# Patient Record
Sex: MALE | Race: WHITE | NOT HISPANIC OR LATINO | Employment: FULL TIME | URBAN - METROPOLITAN AREA
[De-identification: names, ages, dates, MRNs, and addresses within clinical notes are randomized per-mention and may not be internally consistent; named-entity substitution may affect disease eponyms.]

---

## 2018-01-16 NOTE — PROGRESS NOTES
Assessment    1  Psoriasis (696 1) (L40 9)   2  Benign enlargement of prostate (600 00) (N40 0)   3  Hyperlipidemia (272 4) (E78 5)   4  Malaise and fatigue (780 79) (R53 81,R53 83)    Plan  Encounter for screening for malignant neoplasm of prostate    · (1) PSA (SCREEN) (Dx V76 44 Screen for Prostate Cancer); Status:Active; Requested  for:12Ivc0815; Health Maintenance, Hyperlipidemia    · Follow-up visit in 1 year Evaluation and Treatment  Follow-up  Status: Complete  Done:  49PUE9212  Hyperlipidemia    · (Q) COMPREHENSIVE METABOLIC PANEL W/O ALT; Status:Active; Requested  for:05Lmw5204;    · (Q) LIPID PANEL WITH DIRECT LDL; Status:Active; Requested for:03Lsp4469;   Malaise and fatigue    · (Q) TESTOSTERONE, TOTAL, LC/MS/MS; Status:Active; Requested for:74Zwl7754;   PMH: Seborrheic dermatitis of scalp    · Ketoconazole 2 % External Cream; APPLY A THIN LAYER TO AFFECTED  AREA(S) AND RUB IN WELL TWICE DAILY   · Ketoconazole 2 % External Shampoo; USE AS DIRECTED  Screening for depression    · *VB-Depression Screening; Status:Complete - Retrospective By Protocol Authorization;    Done: 29WVW4636 03:07PM    Discussion/Summary  Impression: health maintenance visit  Currently, he eats an adequate diet and has an inadequate exercise regimen  Prostate cancer screening: prostate cancer screening is current  Testicular cancer screening: monthly self testicular exam was advised  Colorectal cancer screening: colorectal cancer screening is current  Screening lab work includes glucose, lipid profile, 25-hydroxyvitamin D and done  The immunizations are up to date  Advice and education were given regarding nutrition, aerobic exercise, calcium supplements, vitamin D supplements, reproductive health, cardiovascular risk reduction, sunscreen use and self skin examination  Patient discussion: discussed with the patient, 30 minute visit, greater than half of the time was spent on counseling        Chief Complaint  Pt is here for a yearly phys  History of Present Illness  Groin Sprain/Strain (Brief): The patient is being seen for an initial evaluation of an existing diagnosis of strain of the groin  The injury occurred none known  Symptoms:  decreased range of motion    The patient presents with complaints of gradual onset of frequent episodes of mild groin stiffness  Episodes started about 2-3 weeks ago  Symptoms are improving  The patient is currently experiencing symptoms  especially getting in and out of car The patient is not currently being treated for this problem  Shoulder Problem: CECI URIOSTEGUI presents with complaints of shoulder problem  , Adult Male: The patient is being seen for a health maintenance evaluation  The last health maintenance visit was 2 year(s) ago  Social History: Household members include spouse and 2 daughter(s)  He is   Work status: working full time and occupation: sales  The patient quit smoking 1993  He has 10 pack year(s) of cigarette use  He reports occasional alcohol use  General Health: The patient's health since the last visit is described as good  He has regular dental visits  He complains of vision problems  Vision care includes an eye examination within the last year  He has hearing loss  Immunizations status: up to date   SEE ENT eval    Lifestyle:  He exercises regularly  He exercises less than three times a week  Exercise includes walking  He does not use tobacco  He consumes alcohol  Reproductive health:  the patient is sexually active  Screening: Prostate cancer screening includes last prostate-specific antigen testing 8/16  Testicular cancer screening includes monthly self testicular examinations  Colorectal cancer screening includes last colonoscopy done 8/16  metabolic screening reviewed and current  Hyperlipidemia (Follow-Up): The patient states his hyperlipidemia has been poorly controlled since the last visit     Symptoms:   Lifestyle: Exercise: He does not exercise regularly  Medications: the patient is not adherent with his medication regimen  skips days and occasionally a week The patient is not doing well with his hyperlipidemia goals  the patient's LDL goal is 100 mg/dL  the patient's last LDL was 156 mg/dL  Fatigue: The patient is being seen for an initial evaluation of and ?testosterone level? fatigue  Dermatitis, Unspecified (Brief): The patient is being seen for a routine clinic follow-up of and seborhea face , chesr unspecified dermatitis  Symptoms:  patchy rash  Associated symptoms:  flaking skin  Current treatment includes topical antifungals  Review of Systems    Constitutional: feeling tired  Eyes: no eyesight problems  ENT: hearing loss and see ENT notes/MRI negative  Cardiovascular: no chest pain  Respiratory: no shortness of breath  Gastrointestinal: no constipation  Genitourinary: no incontinence  Musculoskeletal: arthralgias, but as noted in HPI  Integumentary: as noted in HPI  ROS reviewed  Active Problems    1  Abdominal pain (789 00) (R10 9)   2  Benign enlargement of prostate (600 00) (N40 0)   3  Diarrhea (787 91) (R19 7)   4  Encounter for screening colonoscopy (V76 51) (Z12 11)   5  Encounter for screening for malignant neoplasm of prostate (V76 44) (Z12 5)   6  Esophagitis, reflux (530 11) (K21 0)   7   Hyperlipidemia (272 4) (E78 5)    Past Medical History    · History of Abdominal cramping (789 00) (R10 9)   · History of Benign Polyps Of The Large Intestine (V12 72)   · History of Laceration Of Hand (882 0)   · History of Pain in joint of right shoulder region (719 41) (M25 511)   · History of Rectal polyp (569 0) (K62 1)   · History of Seborrheic dermatitis of scalp (690 18) (L21 9)    Surgical History    · History of Appendectomy   · History of Colonoscopy (Fiberoptic)   · History of Corneal LASIK   · History of Septoplasty   · History of Surgery Vas Deferens Vasectomy    Family History  Mother    · Family history of Back Pain   · Family history of Hypertension (V17 49)  Father    · Family history of Malignant Melanoma Of The Skin (V16 8)   · Family history of Prostate Cancer (V16 42)  Brother    · Family history of Osteoporosis (V17 81)    Social History    · Being A Social Drinker   · Denied: History of Drug Use   · Former smoker (V15 82) (D99 759)   · quit 1993    Current Meds   1  Crestor 5 MG Oral Tablet; Take 1 tablet daily; Therapy: 89DVN5374 to (Evaluate:17Nks9918)  Requested for: 54UTY4416; Last   Rx:48Bmw1315 Ordered    Allergies    1  No Known Drug Allergies    Vitals   Recorded: 02Ysf8690 03:32PM Recorded: 73ZBF2171 51:36LB   Systolic 062    Diastolic 78    Height  5 ft 11 in   Weight  185 lb 2 08 oz   BMI Calculated  25 82   BSA Calculated  2 04     Physical Exam    Constitutional   General appearance: No acute distress, well appearing and well nourished  well developed, within normal limits of ideal weight and appearance reflects stated age  Eyes   Pupils and irises: Equal, round, reactive to light  Ears, Nose, Mouth, and Throat   Otoscopic examination: Tympanic membranes translucent with normal light reflex  Canals patent without erythema  Nasal mucosa, septum, and turbinates: Normal without edema or erythema  Oropharynx: Normal with no erythema, edema, exudate or lesions  Pulmonary   Auscultation of lungs: Clear to auscultation  Cardiovascular   Auscultation of heart: Normal rate and rhythm, normal S1 and S2, no murmurs  Peripheral vascular exam: Normal     Examination of extremities for edema and/or varicosities: Normal     Abdomen   Abdomen: Non-tender, no masses  Liver and spleen: No hepatomegaly or splenomegaly  Examination for hernias: No hernias appreciated  Musculoskeletal   Gait and station: Normal     Inspection/palpation of joints, bones, and muscles: Abnormal   Palpation - right trapezius trigger points siddhartha T1 dermatome tenderness     Range of motion: Abnormal   Range of Motion: Right Shoulder: Full  Abduction: painful restricted AROM 170 degrees  Stability: Normal     Muscle strength/tone: Normal     Neurologic   Sensation: No sensory loss      Psychiatric   Orientation to person, place and time: Normal     Mood and affect: Normal        Results/Data  *VB-Depression Screening 96VDA6173 49:60RL Dora Mercury     Test Name Result Flag Reference   Depression Scale Result      Depression Screen - Negative For Symptoms     Prime MD Depression Screening 53Ies1423 03:06PM User, s     Test Name Result Flag Reference   PRIME-MD Depression Screening 0/9 - Likely not MD     Depressed mood: No  Loss of interest: No       Signatures   Electronically signed by : Zakiya Snider DO; Sep 19 9374  4:20PM EST                       (Author)

## 2018-01-30 LAB
ALBUMIN SERPL-MCNC: 4.4 G/DL (ref 3.6–5.1)
ALBUMIN/GLOB SERPL: 1.6 (CALC) (ref 1–2.5)
ALP SERPL-CCNC: 79 U/L (ref 40–115)
AST SERPL-CCNC: 15 U/L (ref 10–35)
BILIRUB SERPL-MCNC: 0.6 MG/DL (ref 0.2–1.2)
BUN SERPL-MCNC: 22 MG/DL (ref 7–25)
BUN/CREAT SERPL: NORMAL (CALC) (ref 6–22)
CALCIUM SERPL-MCNC: 9.8 MG/DL (ref 8.6–10.3)
CHLORIDE SERPL-SCNC: 104 MMOL/L (ref 98–110)
CHOLEST SERPL-MCNC: 214 MG/DL
CHOLEST/HDLC SERPL: 4.7 (CALC)
CO2 SERPL-SCNC: 24 MMOL/L (ref 20–31)
CREAT SERPL-MCNC: 0.98 MG/DL (ref 0.7–1.33)
GLOBULIN SER CALC-MCNC: 2.7 G/DL (CALC) (ref 1.9–3.7)
GLUCOSE SERPL-MCNC: 95 MG/DL (ref 65–99)
HDLC SERPL-MCNC: 46 MG/DL
LDLC SERPL CALC-MCNC: 131 MG/DL (CALC)
NONHDLC SERPL-MCNC: 168 MG/DL (CALC)
POTASSIUM SERPL-SCNC: 4.4 MMOL/L (ref 3.5–5.3)
PROT SERPL-MCNC: 7.1 G/DL (ref 6.1–8.1)
PSA SERPL-MCNC: 1.4 NG/ML
SL AMB EGFR AFRICAN AMERICAN: 102 ML/MIN/1.73M2
SL AMB EGFR NON AFRICAN AMERICAN: 88 ML/MIN/1.73M2
SODIUM SERPL-SCNC: 140 MMOL/L (ref 135–146)
TESTOST SERPL-MCNC: 342 NG/DL (ref 250–1100)
TRIGL SERPL-MCNC: 229 MG/DL

## 2018-02-05 RX ORDER — ROSUVASTATIN CALCIUM 5 MG/1
1 TABLET, COATED ORAL DAILY
COMMUNITY
Start: 2011-03-14 | End: 2019-03-13 | Stop reason: SDUPTHER

## 2018-02-08 ENCOUNTER — OFFICE VISIT (OUTPATIENT)
Dept: FAMILY MEDICINE CLINIC | Facility: CLINIC | Age: 53
End: 2018-02-08
Payer: COMMERCIAL

## 2018-02-08 VITALS
BODY MASS INDEX: 27.6 KG/M2 | DIASTOLIC BLOOD PRESSURE: 78 MMHG | HEIGHT: 70 IN | SYSTOLIC BLOOD PRESSURE: 124 MMHG | WEIGHT: 192.8 LBS

## 2018-02-08 DIAGNOSIS — Z00.00 HEALTH CARE MAINTENANCE: Primary | ICD-10-CM

## 2018-02-08 DIAGNOSIS — Z20.828 EXPOSURE TO THE FLU: Primary | ICD-10-CM

## 2018-02-08 DIAGNOSIS — J00 ACUTE NASOPHARYNGITIS: ICD-10-CM

## 2018-02-08 DIAGNOSIS — E78.01 FAMILIAL HYPERCHOLESTEROLEMIA: ICD-10-CM

## 2018-02-08 DIAGNOSIS — Z12.5 SCREENING FOR PROSTATE CANCER: ICD-10-CM

## 2018-02-08 PROCEDURE — 99396 PREV VISIT EST AGE 40-64: CPT | Performed by: FAMILY MEDICINE

## 2018-02-08 RX ORDER — OSELTAMIVIR PHOSPHATE 75 MG/1
75 CAPSULE ORAL DAILY
Qty: 10 CAPSULE | Refills: 0 | Status: SHIPPED | OUTPATIENT
Start: 2018-02-08 | End: 2018-02-18

## 2018-02-08 NOTE — TELEPHONE ENCOUNTER
Please sign/auth rx
Wife was diagnosed with the flu    [facility]
no dryness/no itching/no rash

## 2018-02-08 NOTE — PROGRESS NOTES
Assessment/Plan:  Patient Instructions   Hyperlipidemia   AMBULATORY CARE:   Hyperlipidemia  is a high level of lipids (fats) in your blood  These lipids include cholesterol or triglycerides  Lipids are made by your body  They also come from the foods you eat  Your body needs lipids to work properly, but high levels increase your risk for heart disease, heart attack, and stroke  Call 911 for any of the following:   · You have any of the following signs of a heart attack:      ¨ Squeezing, pressure, or pain in your chest that lasts longer than 5 minutes or returns    ¨ Discomfort or pain in your back, neck, jaw, stomach, or arm     ¨ Trouble breathing    ¨ Nausea or vomiting    ¨ Lightheadedness or a sudden cold sweat, especially with chest pain or trouble breathing    · You have any of the following signs of a stroke:      ¨ Numbness or drooping on one side of your face     ¨ Weakness in an arm or leg    ¨ Confusion or difficulty speaking    ¨ Dizziness, a severe headache, or vision loss  Contact your healthcare provider if:   · You have questions or concerns about your condition or care  Treatment of hyperlipidemia  may first include lifestyle changes to help decrease your lipid levels  You may also need to take medicine to lower your lipid levels  Some of the lifestyle changes you may need to make include the following:  · Maintain a healthy weight  Ask your healthcare provider how much you should weigh  Ask him or her to help you create a weight loss plan if you are overweight  Weight loss can decrease your cholesterol and triglyceride levels  · Exercise as directed  Exercise lowers your cholesterol levels and helps you maintain a healthy weight  Get 40 minutes or more of moderate exercise 3 to 4 days each week  You can split your exercise into four 10-minute workouts instead of 40 minutes at one time  Examples of moderate exercises include walking briskly, swimming, or riding a bike   Work with your healthcare provider to plan the best exercise program for you  · Do not smoke  Nicotine and other chemicals in cigarettes and cigars can increase your risk for a heart attack and stroke  Ask your healthcare provider for information if you currently smoke and need help to quit  E-cigarettes or smokeless tobacco still contain nicotine  Talk to your healthcare provider before you use these products  · Eat heart-healthy foods  Talk to your dietitian about a heart-healthy diet  The following will help you manage hyperlipidemia:     ¨ Decrease the total amount of fat you eat  Choose lean meats, fat-free or 1% fat milk, and low-fat dairy products, such as yogurt and cheese  Limit or do not eat red meat  Red meats are high in fat and cholesterol  ¨ Replace unhealthy fats with healthy fats  Unhealthy fats include saturated fat, trans fat, and cholesterol  Choose soft margarines that are low in saturated fat and have little or no trans fat  Monounsaturated fats are healthy fats  These are found in olive oil, canola oil, avocado, and nuts  Polyunsaturated fats are also healthy  These are found in fish, flaxseed, walnuts, and soybeans  ¨ Eat fruits and vegetables every day  They are low in calories and fat and a good source of essential vitamins  Include dark green, red, and orange vegetables  Examples include spinach, kale, broccoli, and carrots  ¨ Eat foods high in fiber  Choose whole grain, high-fiber foods  Good choices include whole-wheat breads or cereals, beans, peas, fruits, and vegetables  · Ask your healthcare provider if it is safe for you to drink alcohol  Alcohol can increase your cholesterol and triglyceride levels  A drink of alcohol is 12 ounces of beer, 5 ounces of wine, or 1½ ounces of liquor  Follow up with your healthcare provider as directed: You may need to return for more tests  Your healthcare provider may refer you to a dietitian   Write down your questions so you remember to ask them during your visits  © 2017 [address] [employer] Information is for End User's use only and may not be sold, redistributed or otherwise used for commercial purposes  All illustrations and images included in CareNotes® are the copyrighted property of A D A M , Inc  or Ryan Melendez  The above information is an  only  It is not intended as medical advice for individual conditions or treatments  Talk to your doctor, nurse or pharmacist before following any medical regimen to see if it is safe and effective for you  Health care maintenance  Here for well check       Diagnoses and all orders for this visit:    Health care maintenance    Familial hypercholesterolemia  -     Lipid panel; Future    Acute nasopharyngitis    Screening for prostate cancer  -     PSA    Other orders  -     rosuvastatin (CRESTOR) 5 mg tablet; Take 1 tablet by mouth daily          Subjective:   Chief Complaint   Patient presents with    Physical Exam     Pt here for well visit and review of labs  Dropped an iron on his left foot last week  Patient ID: Donya Todd is a 46 y o  male  Well Adult Physical   Patient here for a comprehensive physical exam The patient reports problems -  Cold symptoms for the past few days  His wife has symptoms of the flu he is not as severe as she is  Do you take any herbs or supplements that were not prescribed by a doctor? no   Are you taking calcium supplements? no   Are you taking aspirin daily? yes     History:  Patient receives prostate care outside our clinic-no  Date last prostate exam: N/A  Date last PSA: 1/28/18              Review of Systems   Constitutional: Negative  HENT: Positive for postnasal drip, rhinorrhea, sinus pain and sore throat  Patient's symptoms are improving   Eyes: Negative  Respiratory: Negative for chest tightness and shortness of breath  Cardiovascular: Negative for chest pain and leg swelling  Gastrointestinal: Negative  Genitourinary: Negative  Musculoskeletal: Negative  Skin:        Patient has slight ecchymosis of the  Left foot with a healed puncture  Site from the iron falling on his foot several weeks ago  Reassured patient that it looks normal   Psychiatric/Behavioral: Negative  Objective:  /78   Ht 5' 9 75" (1 772 m)   Wt 87 5 kg (192 lb 12 8 oz)   BMI 27 86 kg/m²     COMPLETE METABOLIC W/O ALT   Order: [id number]   Status:  Final result   Visible to patient:  No (Inaccessible in MyChart)   Next appt:  None    Ref Range & Units 1/27/18  7:48 AM   SL AMB GLUCOSE 65 - 99 mg/dL 95    Comments:                Fasting reference interval        BUN 7 - 25 mg/dL 22    Creatinine, Serum 0 70 - 1 33 mg/dL 0 98    Comments: For patients >52years of age, the reference limit   for Creatinine is approximately 13% higher for people   identified as -American         eGFR Non African American > OR = 60 mL/min/1 73m2 88    SL AMB EGFR AFRICAN AMERICAN > OR = 60 mL/min/1 73m2 102    SL AMB BUN/CREATININE RATIO 6 - 22 (calc)  NOT APPLICABLE    SL AMB SODIUM 135 - 146 mmol/L 140    SL AMB POTASSIUM 3 5 - 5 3 mmol/L 4 4    SL AMB CHLORIDE 98 - 110 mmol/L 104    SL AMB CARBON DIOXIDE 20 - 31 mmol/L 24    SL AMB CALCIUM 8 6 - 10 3 mg/dL 9 8    SL AMB PROTEIN, TOTAL 6 1 - 8 1 g/dL 7 1    Serum Albumin 3 6 - 5 1 g/dL 4 4    SL AMB GLOBULIN 1 9 - 3 7 g/dL (calc) 2 7    SL AMB ALBUMIN/GLOBULIN RATIO 1 0 - 2 5 (calc) 1 6    SL AMB BILIRUBIN, TOTAL 0 2 - 1 2 mg/dL 0 6    SL AMB ALKALINE PHOSPHATASE 40 - 115 U/L 79    SL AMB AST 10 - 35 U/L 15    Narrative     FASTING:YES  FASTING: YES    Performing Organization Information:      Site ID: [facility]all Book: [facility]      Address: [address], [facility]      Director:  Nilam Arnold MD PhD      Specimen Collected: 01/27/18  7:48 AM Last Resulted: 01/30/18  8:24 AM                       Other Results from 1/27/2018        Lipid Panel with Direct LDL reflex   Order: [id number]     Status:  Final result   Visible to patient:  No (Inaccessible in [address])   Next appt:  None    Ref Range & Units 1/27/18  7:48 AM Flag   Total Cholesterol <200 mg/dL 214   H    SL AMB HDL CHOLESTEROL >40 mg/dL 46     Triglycerides <150 mg/dL 229   H    SL AMB LDL-CHOLESTEROL mg/dL (calc) 131   H    Comments: Reference range: <100       Desirable range <100 mg/dL for patients with CHD or   diabetes and <70 mg/dL for diabetic patients with   known heart disease        LDL-C is now calculated using the Severiano-Escalante   calculation, which is a validated novel method providing   better accuracy than the Friedewald equation in the   estimation of LDL-C  Nicci Barbosa  Teri Cruz  6703;468(71): 5015-4394   (http://[employer]/faq/DDX396)    SL AMB CHOL/HDLC RATIO <5 0 (calc) 4 7     SL AMB NON HDL CHOLESTEROL <130 mg/dL (calc) 168   H    Comments: For patients with diabetes plus 1 major ASCVD risk   factor, treating to a non-HDL-C goal of <100 mg/dL   (LDL-C of <70 mg/dL) is considered a therapeutic   option  Narrative     FASTING:YES  FASTING: YES    Performing Organization Information:      Site ID: Amador Swann: [facility] [address]      Address: [address] [place] [address]      Director: Stephanie Hopson MD PhD      Specimen Collected: 01/27/18  7:48 AM Last Resulted: 01/30/18  8:24 AM                          Testosterone, Total, LC/MS/MS   Order: [id number]     Status:  Final result   Visible to patient:  No (Inaccessible in [address])   Next appt:  None    Ref Range & Units 1/27/18  7:48 AM   Testosterone, Total, LC/ - 1,100 ng/dL 342    Comments: For more information on this test, go to   http://[employer]/faq/   TotalTestosteroneLCMSMS           This test was developed and its analytical performance   characteristics have been determined by [facility] [address] [place] [address]  It has   not been cleared or approved by the [place]  Food and Drug   Administration  This assay has been validated pursuant   to the CLIA regulations and is used for clinical   purposes         Narrative     FASTING:YES  FASTING: YES    Performing Organization Information:      Site ID: BRITNEY [facility]: [employer]      Address: [address] [place] [address]      Director: Jose IZQUIERDO ,PhD      Specimen Collected: 01/27/18  7:48 AM Last Resulted: 01/30/18  8:24 AM                          PSA   Order: [id number]     Status:  Final result   Visible to patient:  No (Inaccessible in [address])   Next appt:  None    Ref Range & Units 1/27/18  7:48 AM   Prostate Specific Antigen Total < OR = 4 0 ng/mL 1 4    Comments: The total PSA value from this assay system is   standardized against the WHO standard  The test   result will be approximately 20% lower when compared   to the equimolar-standardized total PSA (Vanessa   [place])  Comparison of serial PSA results should be   interpreted with this fact in mind        This test was performed using the Siemens   chemiluminescent method  Values obtained from   different assay methods cannot be used   interchangeably  PSA levels, regardless of   value, should not be interpreted as absolute   evidence of the presence or absence of disease  Narrative     FASTING:YES  FASTING: YES    Performing Organization Information:      Site ID: Brenda Morejonden: [facility]      [facility] [address] [facility]      Director: Basim Lopez MD PhD      Specimen Collected: 01/27/18  7:48 AM Last Resulted: 01/30/18  8:24 AM                       1/30/2018  8:30 AM        Physical Exam   Constitutional: He is oriented to person, place, and time  He appears well-developed and well-nourished      this is a 51-year-old white male who is here for well checkup but has some issues with a upper respiratory infection  He has a slightly elevated BMI at 27%   HENT:   Head: Normocephalic and atraumatic  Right Ear: External ear normal    Left Ear: External ear normal    Mouth/Throat: Oropharynx is clear and moist  No oropharyngeal exudate  Slight postnasal drip noted in the anterior pharynx   Eyes: EOM are normal  Pupils are equal, round, and reactive to light  Neck: Normal range of motion  Cardiovascular: Normal rate and regular rhythm  Pulmonary/Chest: Effort normal and breath sounds normal    Abdominal: Soft  Bowel sounds are normal    Musculoskeletal: Normal range of motion  Neurological: He is alert and oriented to person, place, and time  Skin: Skin is warm  Psychiatric: He has a normal mood and affect   His behavior is normal  Judgment and thought content normal

## 2018-02-10 NOTE — PATIENT INSTRUCTIONS

## 2018-07-23 ENCOUNTER — OFFICE VISIT (OUTPATIENT)
Dept: FAMILY MEDICINE CLINIC | Facility: CLINIC | Age: 53
End: 2018-07-23
Payer: COMMERCIAL

## 2018-07-23 VITALS
HEIGHT: 70 IN | SYSTOLIC BLOOD PRESSURE: 132 MMHG | DIASTOLIC BLOOD PRESSURE: 84 MMHG | WEIGHT: 186.8 LBS | BODY MASS INDEX: 26.74 KG/M2 | TEMPERATURE: 96.8 F

## 2018-07-23 DIAGNOSIS — J30.2 SEASONAL ALLERGIC RHINITIS, UNSPECIFIED TRIGGER: ICD-10-CM

## 2018-07-23 DIAGNOSIS — J32.9 SINUSITIS, UNSPECIFIED CHRONICITY, UNSPECIFIED LOCATION: Primary | ICD-10-CM

## 2018-07-23 PROCEDURE — 99213 OFFICE O/P EST LOW 20 MIN: CPT | Performed by: FAMILY MEDICINE

## 2018-07-23 RX ORDER — PREDNISONE 10 MG/1
TABLET ORAL
Qty: 21 TABLET | Refills: 0 | Status: SHIPPED | OUTPATIENT
Start: 2018-07-23 | End: 2019-01-14

## 2018-07-23 RX ORDER — FLUTICASONE PROPIONATE 50 MCG
2 SPRAY, SUSPENSION (ML) NASAL DAILY
Qty: 16 G | Refills: 1 | Status: SHIPPED | OUTPATIENT
Start: 2018-07-23 | End: 2019-01-14

## 2018-07-23 RX ORDER — KETOCONAZOLE 20 MG/G
CREAM TOPICAL
Refills: 0 | COMMUNITY
Start: 2018-05-09 | End: 2019-01-14

## 2018-07-23 RX ORDER — KETOCONAZOLE 20 MG/ML
SHAMPOO TOPICAL
Refills: 1 | COMMUNITY
Start: 2018-05-09 | End: 2019-09-08 | Stop reason: ALTCHOICE

## 2018-07-23 RX ORDER — AZITHROMYCIN 500 MG/1
500 TABLET, FILM COATED ORAL DAILY
Qty: 3 TABLET | Refills: 0 | Status: SHIPPED | OUTPATIENT
Start: 2018-07-23 | End: 2018-07-26

## 2018-07-23 NOTE — PATIENT INSTRUCTIONS
Rest and fluids, blow nose as directed, start abx and prednisone and flonase generic as directed, may use Claritin 10 mg daily prn allergies  Call if worse

## 2018-07-23 NOTE — PROGRESS NOTES
Assessment/Plan:  Chief Complaint   Patient presents with    Cold Like Symptoms     symptoms started Wednesday or Thursday   Sinusitis    Cough     dry     Patient Instructions   Rest and fluids, blow nose as directed, start abx and prednisone and flonase generic as directed, may use Claritin 10 mg daily prn allergies  Call if worse  No problem-specific Assessment & Plan notes found for this encounter  Diagnoses and all orders for this visit:    Sinusitis, unspecified chronicity, unspecified location  -     azithromycin (ZITHROMAX) 500 MG tablet; Take 1 tablet (500 mg total) by mouth daily for 3 days  -     predniSONE 10 mg tablet; Take 60 mg po day#1, 50 mg po day#2, 40 mg po day#3, 30 mg po day#4, 20 mg po day#5m and 10 mg po day#6  -     fluticasone (FLONASE) 50 mcg/act nasal spray; 2 sprays into each nostril daily    Seasonal allergic rhinitis, unspecified trigger  -     fluticasone (FLONASE) 50 mcg/act nasal spray; 2 sprays into each nostril daily          Subjective:      Patient ID: Elkin North is a 48 y o  male  Cold Like Symptoms (symptoms started Wednesday or Thursday )  Sinusitis   Cough (dry)    No bloody noses, started last Wednesday  No fever or chills  Some colored phlegm  Hx of seasonal allergies  Took ibuprofen and naproxen and Mucinex DM  Sinusitis   Associated symptoms include coughing and sinus pressure  Cough         The following portions of the patient's history were reviewed and updated as appropriate: allergies, current medications, past family history, past medical history, past social history, past surgical history and problem list     Review of Systems   Constitutional: Negative  HENT: Positive for sinus pain and sinus pressure  Eyes: Negative  Respiratory: Positive for cough  Cardiovascular: Negative  Gastrointestinal: Negative  Endocrine: Negative  Genitourinary: Negative  Musculoskeletal: Negative  Skin: Negative  Allergic/Immunologic: Negative  Neurological: Negative  Hematological: Negative  Psychiatric/Behavioral: Negative  Objective:      /84   Temp (!) 96 8 °F (36 °C)   Ht 5' 10" (1 778 m)   Wt 84 7 kg (186 lb 12 8 oz)   BMI 26 80 kg/m²          Physical Exam   Constitutional: He is oriented to person, place, and time  He appears well-developed and well-nourished  HENT:   Head: Normocephalic and atraumatic  Right Ear: External ear normal    Left Ear: External ear normal    Sinus tenderness, sinus congestion, pnd   Eyes: Conjunctivae and EOM are normal  Pupils are equal, round, and reactive to light  Neck: Normal range of motion  Neck supple  Cardiovascular: Normal rate, regular rhythm, normal heart sounds and intact distal pulses  Pulmonary/Chest: Effort normal and breath sounds normal    Musculoskeletal: Normal range of motion  Neurological: He is alert and oriented to person, place, and time  He has normal reflexes  Skin: Skin is warm and dry  Psychiatric: He has a normal mood and affect   His behavior is normal

## 2018-08-27 ENCOUNTER — TELEPHONE (OUTPATIENT)
Dept: FAMILY MEDICINE CLINIC | Facility: CLINIC | Age: 53
End: 2018-08-27

## 2018-08-27 DIAGNOSIS — E78.5 HYPERLIPIDEMIA, UNSPECIFIED HYPERLIPIDEMIA TYPE: Primary | ICD-10-CM

## 2018-08-27 NOTE — TELEPHONE ENCOUNTER
----- Message from Malick Mccray DO sent at 6/43/8316  1:39 PM EDT -----  The prescription came over to me  Yuniel Navas really should be listed as the primary care physician  So few can recent a prescription for her to verify that would be great also the patient should have had a 6 month follow-up with a CMP and a lipid profile  Can you ordered this and please call the patient let him know that he is due and should be seen every 6 months while on cholesterol medication

## 2018-09-19 ENCOUNTER — OFFICE VISIT (OUTPATIENT)
Dept: FAMILY MEDICINE CLINIC | Facility: CLINIC | Age: 53
End: 2018-09-19
Payer: COMMERCIAL

## 2018-09-19 ENCOUNTER — APPOINTMENT (OUTPATIENT)
Dept: RADIOLOGY | Facility: MEDICAL CENTER | Age: 53
End: 2018-09-19
Payer: COMMERCIAL

## 2018-09-19 VITALS
HEIGHT: 71 IN | BODY MASS INDEX: 26.65 KG/M2 | SYSTOLIC BLOOD PRESSURE: 120 MMHG | WEIGHT: 190.4 LBS | DIASTOLIC BLOOD PRESSURE: 90 MMHG

## 2018-09-19 DIAGNOSIS — M25.511 CHRONIC RIGHT SHOULDER PAIN: Primary | ICD-10-CM

## 2018-09-19 DIAGNOSIS — Z12.5 ENCOUNTER FOR SCREENING FOR MALIGNANT NEOPLASM OF PROSTATE: ICD-10-CM

## 2018-09-19 DIAGNOSIS — E78.00 PURE HYPERCHOLESTEROLEMIA: ICD-10-CM

## 2018-09-19 DIAGNOSIS — G89.29 CHRONIC RIGHT SHOULDER PAIN: ICD-10-CM

## 2018-09-19 DIAGNOSIS — M25.511 CHRONIC RIGHT SHOULDER PAIN: ICD-10-CM

## 2018-09-19 DIAGNOSIS — G89.29 CHRONIC RIGHT SHOULDER PAIN: Primary | ICD-10-CM

## 2018-09-19 PROCEDURE — 99213 OFFICE O/P EST LOW 20 MIN: CPT | Performed by: FAMILY MEDICINE

## 2018-09-19 PROCEDURE — 73030 X-RAY EXAM OF SHOULDER: CPT

## 2018-09-19 PROCEDURE — 3008F BODY MASS INDEX DOCD: CPT | Performed by: FAMILY MEDICINE

## 2018-09-19 NOTE — PROGRESS NOTES
Assessment/Plan:  Based on the patient's history and examination recommend x-rays of the right shoulder and referral to Orthopedics  Might be difficult for the patient to start therapy before seeing Orthopedics secondary to his travel schedule with work  Patient also will schedule a follow-up for repeat lipid panel PSA and CM P   Blood pressure stable today  Diagnoses and all orders for this visit:    Chronic right shoulder pain  -     XR shoulder 2+ vw right; Future  -     Ambulatory referral to Orthopedic Surgery; Future    Pure hypercholesterolemia  -     Lipid panel; Future  -     Comprehensive metabolic panel; Future    Encounter for screening for malignant neoplasm of prostate  -     PSA, Total Screen; Future        There are no Patient Instructions on file for this visit  Subjective:        Patient ID: Cristy Ordoñez is a 48 y o  male  Chief Complaint   Patient presents with    Shoulder Pain     Sx started last year R shoulder issues, past month it has been getting better, pian when pt feels it is 5 or 6    Flu Vaccine     discuss       Patient here for evaluation of chronic right shoulder pain  Has had chronic right shoulder pain for greater than 2-3 months  Has not had any injury  Did put on addition to his home so he was doing a lot of hammering and sawing  Has tried ice heat and anti-inflammatories without any improvement        The following portions of the patient's history were reviewed and updated as appropriate: past medical history, past surgical history and problem list       Review of Systems   Respiratory: Negative for shortness of breath  Cardiovascular: Negative for chest pain  Musculoskeletal:        Reproducible right shoulder pain with movement           Objective:  /90 (BP Location: Left arm, Patient Position: Sitting, Cuff Size: Standard)   Ht 5' 10 5" (1 791 m)   Wt 86 4 kg (190 lb 6 4 oz)   BMI 26 93 kg/m²        Physical Exam   Constitutional: He is oriented to person, place, and time  He appears well-nourished  No distress  HENT:   Head: Normocephalic  Cardiovascular: Normal rate, regular rhythm and normal heart sounds  Pulmonary/Chest: Breath sounds normal    Musculoskeletal: He exhibits no edema  Reproducible pain with rotator cuff maneuvers  Neurological: He is alert and oriented to person, place, and time  Nursing note and vitals reviewed

## 2018-11-02 ENCOUNTER — IMMUNIZATION (OUTPATIENT)
Dept: FAMILY MEDICINE CLINIC | Facility: CLINIC | Age: 53
End: 2018-11-02
Payer: COMMERCIAL

## 2018-11-02 DIAGNOSIS — Z23 ENCOUNTER FOR IMMUNIZATION: ICD-10-CM

## 2018-11-02 PROCEDURE — 90471 IMMUNIZATION ADMIN: CPT

## 2018-11-02 PROCEDURE — 90682 RIV4 VACC RECOMBINANT DNA IM: CPT

## 2018-11-26 ENCOUNTER — EVALUATION (OUTPATIENT)
Dept: PHYSICAL THERAPY | Facility: MEDICAL CENTER | Age: 53
End: 2018-11-26
Payer: COMMERCIAL

## 2018-11-26 DIAGNOSIS — G89.29 CHRONIC RIGHT SHOULDER PAIN: Primary | ICD-10-CM

## 2018-11-26 DIAGNOSIS — M25.511 CHRONIC RIGHT SHOULDER PAIN: Primary | ICD-10-CM

## 2018-11-26 PROCEDURE — G8985 CARRY GOAL STATUS: HCPCS | Performed by: PHYSICAL THERAPIST

## 2018-11-26 PROCEDURE — 97112 NEUROMUSCULAR REEDUCATION: CPT | Performed by: PHYSICAL THERAPIST

## 2018-11-26 PROCEDURE — G8984 CARRY CURRENT STATUS: HCPCS | Performed by: PHYSICAL THERAPIST

## 2018-11-26 PROCEDURE — 97161 PT EVAL LOW COMPLEX 20 MIN: CPT | Performed by: PHYSICAL THERAPIST

## 2018-11-26 NOTE — LETTER
2018    Gerry Swann DO  [address] [facility]    Patient: Janak Cota   YOB: 1965   Date of Visit: 2018     Encounter Diagnosis     ICD-10-CM    1  Chronic right shoulder pain M25 511 PT plan of care cert/re-cert    [id number]        Dear Dr Mirna Aguilar:    Please review the attached Plan of Care from Josué Bruce's recent visit  Please verify that you agree therapy should continue by signing the attached document and sending it back to our office  If you have any questions or concerns, please don't hesitate to call  Sincerely,    Jerri Brewer, PT      Referring Provider:      I certify that I have read the below Plan of Care and certify the need for these services furnished under this plan of treatment while under my care  Gerry Swann DO  [address]: [phone]          PT Evaluation     Today's date: 2018  Patient name: Janak Cota  : 1965  MRN: [id number]  Referring provider: Fawad Childress DO  Dx: No diagnosis found  Assessment  Assessment details: Janak Cota is a 48 y o  y/o male who presents with complaints of right shoulder pain  The patient's greatest concerns are pain while performing yard work, or functional activities around the house that require lifting overhead  Patient presents with scapular accessory muscle weakness, poor scapular neuromuscular control, and upper trap dominance throughout  Primary movement impairment diagnosis of scapular dyskinesis resulting in pathoanatomical symptoms of right shoulder pain, which limits his ability to perform functional activities without pain    Pt  will benefit from skilled PT services that includes manual therapy techniques to enhance tissue extensibility, neuromuscular re-education to facilitate motor control, therapeutic exercise to increase functional mobility, and modalities prn to reduce pain and inflammation  Impairments: abnormal coordination, abnormal muscle firing, abnormal muscle tone, abnormal or restricted ROM, abnormal movement, impaired physical strength, lacks appropriate home exercise program, pain with function, scapular dyskinesis, poor posture  and poor body mechanics  Understanding of Dx/Px/POC: good   Prognosis: good    Plan  Patient would benefit from: PT eval  Planned modality interventions: thermotherapy: hydrocollator packs and low level laser therapy  Planned therapy interventions: joint mobilization, manual therapy, muscle pump exercises, neuromuscular re-education, patient education, postural training, stretching, strengthening, therapeutic exercise, home exercise program, graded activity, functional ROM exercises, flexibility, balance/weight bearing training, abdominal trunk stabilization, activity modification and body mechanics training  Frequency: 2x week  Duration in weeks: 8  Treatment plan discussed with: patient      Subjective Evaluation    History of Present Illness  Mechanism of injury: Patient reports that approximately 1 year ago, he noticed pain in his right shoulder after falling when skate boarding  Patient had a steroid injection in the shoulder in  and feels much better since, but has occasional tightness in the right shoulder when performing yard work, lifting, and other activities around the house  Patient would like to have no pain in the shoulder      Occupation:  [employer]   Pain  Current pain ratin  At best pain ratin  At worst pain ratin  Quality: sharp  Relieving factors: rest      Diagnostic Tests  X-ray: normal  Treatments  Current treatment: physical therapy  Patient Goals  Patient goals for therapy: increased strength, independence with ADLs/IADLs, decreased pain, improved balance, increased motion and return to sport/leisure activities        Objective     Postural Observations  Seated posture: fair  Standing posture: fair  Correction of posture: has no consistent effect        Palpation     Right   Hypertonic in the latissimus, levator scapulae and upper trapezius  Tenderness of the anterior deltoid, supraspinatus and upper trapezius  Tenderness     Right Shoulder  Tenderness in the supraspinatus tendon  No tenderness in the biceps tendon (proximal)  Cervical/Thoracic Screen   Cervical range of motion within normal limits  Thoracic range of motion within normal limits    Neurological Testing     Sensation     Shoulder   Left Shoulder   Intact: light touch    Right Shoulder   Intact: light touch    Additional Neurological Details  Reflexes NT  Active Range of Motion   Left Shoulder   Normal active range of motion    Right Shoulder   Flexion: WFL  Extension: WFL  Abduction: WFL  Adduction: WFL  External rotation 0°:  WFL  External rotation 45°:  WFL  External rotation 90°:  WFL  External rotation BTH: T3   Internal rotation 0°:  WFL  Internal rotation 45°:  WFL  Internal rotation 90°:  45 degrees   Internal rotation BTB: T8 with pain  Horizontal abduction: WFL  Horizontal adduction: WFL    Passive Range of Motion     Right Shoulder   Internal rotation 90°:  50 degrees     Scapular Mobility   Left Shoulder   Scapular Dyskinesis: grade I  Scapular mobility: fair  Scapular Mobility with Shoulder to 90° FF   Scapular winging: minimal  Scapular elevation: minimal    Right Shoulder   Scapular Dyskinesis: grade I  Scapular mobility: fair  Scapular Mobility with Shoulder to 90° FF   Scapular winging: minimal  Scapular elevation: moderate    Joint Play     Right Shoulder  Hypomobile in the posterior capsule and inferior capsule       Strength/Myotome Testing   Cervical Spine     Right etr  Levator scapulae (C4): 5    Left Shoulder     Planes of Motion   Flexion: 5   Extension: 5   Abduction: 5   Adduction: 5   External rotation at 0°:  5   External rotation at 90°:  5   Internal rotation at 0°:  5   Internal rotation at 90°:  5 Isolated Muscles   Anterior deltoid: 5   Biceps: 5   Infraspinatus: 5   Latissimus: 5   Lower trapezius: 5   Middle trapezius: 4   Posterior deltoid: 5   Rhomboids: 4   Serratus anterior: 4   Subscapularis: 5   Supraspinatus: 4   Teres major: 5   Teres minor: 5   Triceps: 5   Upper trapezius: 5     Right Shoulder     Planes of Motion   Flexion: 5   Extension: 5   Abduction: 4   Adduction: 5   External rotation at 0°:  5   External rotation at 90°:  4   Internal rotation at 0°:  5   Internal rotation at 90°:  4     Isolated Muscles   Anterior deltoid: 5   Biceps: 5   Infraspinatus: 5   Latissimus: 5   Levator scapulae: 5   Middle deltoid: 5   Middle trapezius: 3+   Posterior deltoid: 5   Rhomboids: 3+   Serratus anterior: 4   Subscapularis: 5   Supraspinatus: 3+   Teres major: 5   Teres minor: 5   Triceps: 5   Upper trapezius: 5     Tests     Right Shoulder   Positive empty can, Hawkin's, Neer's and scapular relocation  Negative active compression ([place]), crank, lift-off, passive horizontal adduction and Speed's       Precautions: None    Daily Treatment Diary     Manual  11/26                                                                                 Exercise Diary                                        Scap 4 2x10 5s hold                                                                                                                                                                                                                                              Modalities              LISA Morales, PT  11/26/2018,2:39 PM

## 2018-11-26 NOTE — PROGRESS NOTES
PT Evaluation     Today's date: 2018  Patient name: Maria Elena Mao  : 1965  MRN: [id number]  Referring provider: Keven Austin DO  Dx: No diagnosis found  Assessment  Assessment details: Maria Elena Mao is a 48 y o  y/o male who presents with complaints of right shoulder pain  The patient's greatest concerns are pain while performing yard work, or functional activities around the house that require lifting overhead  Patient presents with scapular accessory muscle weakness, poor scapular neuromuscular control, and upper trap dominance throughout  Primary movement impairment diagnosis of scapular dyskinesis resulting in pathoanatomical symptoms of right shoulder pain, which limits his ability to perform functional activities without pain  Pt  will benefit from skilled PT services that includes manual therapy techniques to enhance tissue extensibility, neuromuscular re-education to facilitate motor control, therapeutic exercise to increase functional mobility, and modalities prn to reduce pain and inflammation    Impairments: abnormal coordination, abnormal muscle firing, abnormal muscle tone, abnormal or restricted ROM, abnormal movement, impaired physical strength, lacks appropriate home exercise program, pain with function, scapular dyskinesis, poor posture  and poor body mechanics  Understanding of Dx/Px/POC: good   Prognosis: good    Plan  Patient would benefit from: PT eval  Planned modality interventions: thermotherapy: hydrocollator packs and low level laser therapy  Planned therapy interventions: joint mobilization, manual therapy, muscle pump exercises, neuromuscular re-education, patient education, postural training, stretching, strengthening, therapeutic exercise, home exercise program, graded activity, functional ROM exercises, flexibility, balance/weight bearing training, abdominal trunk stabilization, activity modification and body mechanics training  Frequency: 2x week  Duration in weeks: 8  Treatment plan discussed with: patient      Subjective Evaluation    History of Present Illness  Mechanism of injury: Patient reports that approximately 1 year ago, he noticed pain in his right shoulder after falling when skate boarding  Patient had a steroid injection in the shoulder in  and feels much better since, but has occasional tightness in the right shoulder when performing yard work, lifting, and other activities around the house  Patient would like to have no pain in the shoulder  Occupation:  [employer]   Pain  Current pain ratin  At best pain ratin  At worst pain ratin  Quality: sharp  Relieving factors: rest      Diagnostic Tests  X-ray: normal  Treatments  Current treatment: physical therapy  Patient Goals  Patient goals for therapy: increased strength, independence with ADLs/IADLs, decreased pain, improved balance, increased motion and return to sport/leisure activities        Objective     Postural Observations  Seated posture: fair  Standing posture: fair  Correction of posture: has no consistent effect        Palpation     Right   Hypertonic in the latissimus, levator scapulae and upper trapezius  Tenderness of the anterior deltoid, supraspinatus and upper trapezius  Tenderness     Right Shoulder  Tenderness in the supraspinatus tendon  No tenderness in the biceps tendon (proximal)  Cervical/Thoracic Screen   Cervical range of motion within normal limits  Thoracic range of motion within normal limits    Neurological Testing     Sensation     Shoulder   Left Shoulder   Intact: light touch    Right Shoulder   Intact: light touch    Additional Neurological Details  Reflexes NT      Active Range of Motion   Left Shoulder   Normal active range of motion    Right Shoulder   Flexion: WFL  Extension: WFL  Abduction: WFL  Adduction: WFL  External rotation 0°: WFL  External rotation 45°: WFL  External rotation 90°: WFL  External rotation BTH: T3   [facility] 0°: [facility]  Internal rotation 45°: [facility]  Internal rotation 90°: 45 degrees   Internal rotation BTB: T8 with pain  Horizontal abduction: WFL  Horizontal adduction: WFL    Passive Range of Motion     Right Shoulder   Internal rotation 90°: 50 degrees     Scapular Mobility   Left Shoulder   Scapular Dyskinesis: grade I  Scapular mobility: fair  Scapular Mobility with Shoulder to 90° FF   Scapular winging: minimal  Scapular elevation: minimal    Right Shoulder   Scapular Dyskinesis: grade I  Scapular mobility: fair  Scapular Mobility with Shoulder to 90° FF   Scapular winging: minimal  Scapular elevation: moderate    Joint Play     Right Shoulder  Hypomobile in the posterior capsule and inferior capsule       Strength/Myotome Testing   Cervical Spine     Right etr  Levator scapulae (C4): 5    Left Shoulder     Planes of Motion   Flexion: 5   Extension: 5   Abduction: 5   Adduction: 5   External rotation at 0°: 5   External rotation at 90°: 5   Internal rotation at 0°: 5   Internal rotation at 90°: 5     Isolated Muscles   Anterior deltoid: 5   Biceps: 5   Infraspinatus: 5   Latissimus: 5   Lower trapezius: 5   Middle trapezius: 4   Posterior deltoid: 5   Rhomboids: 4   Serratus anterior: 4   Subscapularis: 5   Supraspinatus: 4   Teres major: 5   Teres minor: 5   Triceps: 5   Upper trapezius: 5     Right Shoulder     Planes of Motion   Flexion: 5   Extension: 5   Abduction: 4   Adduction: 5   External rotation at 0°: 5   External rotation at 90°: 4   Internal rotation at 0°: 5   Internal rotation at 90°: 4     Isolated Muscles   Anterior deltoid: 5   Biceps: 5   Infraspinatus: 5   Latissimus: 5   Levator scapulae: 5   Middle deltoid: 5   Middle trapezius: 3+   Posterior deltoid: 5   Rhomboids: 3+   Serratus anterior: 4   Subscapularis: 5   Supraspinatus: 3+   Teres major: 5   Teres minor: 5   Triceps: 5   Upper trapezius: 5     Tests     Right Shoulder   Positive empty can, Fred's, Neer's and scapular relocation  Negative active compression ([place]), crank, lift-off, passive horizontal adduction and Speed's       Precautions: None    Daily Treatment Diary     Manual  11/26                                                                                 Exercise Diary                                        Scap 4 2x10 5s hold                                                                                                                                                                                                                                              Modalities              LISA Hearn, PT  11/26/2018,2:39 PM

## 2018-11-30 ENCOUNTER — OFFICE VISIT (OUTPATIENT)
Dept: PHYSICAL THERAPY | Facility: MEDICAL CENTER | Age: 53
End: 2018-11-30
Payer: COMMERCIAL

## 2018-11-30 DIAGNOSIS — G89.29 CHRONIC RIGHT SHOULDER PAIN: Primary | ICD-10-CM

## 2018-11-30 DIAGNOSIS — M25.511 CHRONIC RIGHT SHOULDER PAIN: Primary | ICD-10-CM

## 2018-11-30 PROCEDURE — 97140 MANUAL THERAPY 1/> REGIONS: CPT | Performed by: PHYSICAL THERAPIST

## 2018-11-30 PROCEDURE — 97110 THERAPEUTIC EXERCISES: CPT | Performed by: PHYSICAL THERAPIST

## 2018-11-30 PROCEDURE — 97112 NEUROMUSCULAR REEDUCATION: CPT | Performed by: PHYSICAL THERAPIST

## 2018-11-30 NOTE — PROGRESS NOTES
Daily Note     Today's date: 2018  Patient name: Morenita Vicente  : 1965  MRN: [id number]  Referring provider: Sunitha Abebe DO  Dx:   Encounter Diagnosis     ICD-10-CM    1  Chronic right shoulder pain M25 511     G89 [id number]                   Subjective:  Patient reports that he is feeling pretty good, with minor anterior shoulder discomfort  Objective: See treatment diary below    Precautions: None    Daily Treatment Diary     Manual             ST mobs  [place]           GH mobs  [place]           Lat/subscap release  [place]                                         Exercise Diary                           SL ER  2x10 2#           Scap 4 2x10 5s hold  2x10 5s hold yellow           UE alph             Banded pull aparts on foam             Biceps stretch                                                                                                                                                                                                       Modalities              MH                                         Assessment:  Patient presents with tenderness at proximal bicep insertion  No pain with exercise progressions  Lat and subscap tightness- addressed with release  Patient responded well to ST stretching with increased ROM and no pain with reaching overhead post tx  Tolerated treatment well  Patient would benefit from continued PT  Plan: Continue per plan of care

## 2018-12-03 ENCOUNTER — OFFICE VISIT (OUTPATIENT)
Dept: PHYSICAL THERAPY | Facility: MEDICAL CENTER | Age: 53
End: 2018-12-03
Payer: COMMERCIAL

## 2018-12-03 DIAGNOSIS — M25.511 CHRONIC RIGHT SHOULDER PAIN: Primary | ICD-10-CM

## 2018-12-03 DIAGNOSIS — G89.29 CHRONIC RIGHT SHOULDER PAIN: Primary | ICD-10-CM

## 2018-12-03 PROCEDURE — 97110 THERAPEUTIC EXERCISES: CPT | Performed by: PHYSICAL THERAPIST

## 2018-12-03 PROCEDURE — 97112 NEUROMUSCULAR REEDUCATION: CPT | Performed by: PHYSICAL THERAPIST

## 2018-12-03 PROCEDURE — 97140 MANUAL THERAPY 1/> REGIONS: CPT | Performed by: PHYSICAL THERAPIST

## 2018-12-03 NOTE — PROGRESS NOTES
Daily Note     Today's date: 12/3/2018  Patient name: Son García  : 1965  MRN: [id number]  Referring provider: Dina Aguilera DO  Dx:   Encounter Diagnosis     ICD-10-CM    1  Chronic right shoulder pain M25 511     G89 [id number]                   Subjective:  Patient reports that he is feeling pretty good, with minor anterior shoulder discomfort  Objective: See treatment diary below    Precautions: None    Daily Treatment Diary     Manual  11/26 11/30 12/3          ST mobs  [place]          [address] mobs  [place]          Lat/subscap release  [place]                                        Exercise Diary                           SL ER  2x10 2# 2x10 2#          Scap 4 2x10 5s hold  2x10 5s hold yellow 2x10 5s hold yellow          UE alph   1x stand          Banded pull aparts on foam   GR 20x          Ecc bicep curl   2x10 5#          Biceps stretch   5x 10s door                                                                                                                                                                                                    Modalities              MH                                         Assessment:  Patient presents without tenderness at proximal bicep insertion  No pain with exercise progressions  Lat and subscap tightness- addressed with release  Patient responded well to ST stretching with increased ROM and no pain with reaching overhead post tx  Tolerated treatment well  Patient would benefit from continued PT  Plan: Continue per plan of care

## 2018-12-07 ENCOUNTER — OFFICE VISIT (OUTPATIENT)
Dept: PHYSICAL THERAPY | Facility: MEDICAL CENTER | Age: 53
End: 2018-12-07
Payer: COMMERCIAL

## 2018-12-07 DIAGNOSIS — G89.29 CHRONIC RIGHT SHOULDER PAIN: Primary | ICD-10-CM

## 2018-12-07 DIAGNOSIS — M25.511 CHRONIC RIGHT SHOULDER PAIN: Primary | ICD-10-CM

## 2018-12-07 PROCEDURE — 97110 THERAPEUTIC EXERCISES: CPT | Performed by: PHYSICAL THERAPIST

## 2018-12-07 PROCEDURE — 97112 NEUROMUSCULAR REEDUCATION: CPT | Performed by: PHYSICAL THERAPIST

## 2018-12-07 PROCEDURE — 97140 MANUAL THERAPY 1/> REGIONS: CPT | Performed by: PHYSICAL THERAPIST

## 2018-12-07 NOTE — PROGRESS NOTES
Daily Note     Today's date: 2018  Patient name: Elodia Garcia  : 1965  MRN: [id number]  Referring provider: Samina Molina DO  Dx:   Encounter Diagnosis     ICD-10-CM    1  Chronic right shoulder pain M25 511     G89 29                   Subjective:  Patient reports that his shoulder is feeling okay without pain throughout the day  Objective: See treatment diary below    Precautions: None    Daily Treatment Diary     Manual  11/26 11/30 12/3 12/7         ST mobs  AZ AZ AZ         [address] mobs  AZ AZ AZ         Lat/subscap release  AZ AZ AZ                                       Exercise Diary                           SL ER  2x10 2# 2x10 2# 3x10 2#         Scap 4 2x10 5s hold  2x10 5s hold yellow 2x10 5s hold yellow 2x10 5s hold GR         UE alph   1x stand 2x stand         Banded pull aparts on foam   GR 20x GR 20x         Ecc bicep curl   2x10 5# 2x10 5#         Biceps stretch   5x 10s door 5x 10s door                                                                                                                                                                                                   Modalities              [facility]                                         Assessment:  Patient presents with severe myofascial restrictions t/o right lat and subscap muscles  Release performed to decrease restrictions  Patient responded well to ST stretching with increased ROM and no pain with reaching overhead post tx  Tolerated treatment well  Patient would benefit from continued PT  Plan: Continue per plan of care

## 2018-12-10 ENCOUNTER — OFFICE VISIT (OUTPATIENT)
Dept: PHYSICAL THERAPY | Facility: MEDICAL CENTER | Age: 53
End: 2018-12-10
Payer: COMMERCIAL

## 2018-12-10 DIAGNOSIS — M25.511 CHRONIC RIGHT SHOULDER PAIN: Primary | ICD-10-CM

## 2018-12-10 DIAGNOSIS — G89.29 CHRONIC RIGHT SHOULDER PAIN: Primary | ICD-10-CM

## 2018-12-10 PROCEDURE — 97140 MANUAL THERAPY 1/> REGIONS: CPT | Performed by: PHYSICAL THERAPIST

## 2018-12-10 PROCEDURE — 97110 THERAPEUTIC EXERCISES: CPT | Performed by: PHYSICAL THERAPIST

## 2018-12-10 NOTE — PROGRESS NOTES
Daily Note     Today's date: 12/10/2018  Patient name: Son García  : 1965  MRN: [id number]  Referring provider: Dina Aguilera DO  Dx:   Encounter Diagnosis     ICD-10-CM    1  Chronic right shoulder pain M25 511     G89 29                   Subjective:  Patient reports that his shoulder is feeling okay without pain throughout the day  Objective: See treatment diary below    Precautions: None    Daily Treatment Diary     Manual  11/26 11/30 12/3 12/7 12/10        ST mobs  AZ AZ AZ AZ        [address] mobs  AZ AZ AZ AZ        Lat/subscap release  AZ AZ AZ                                       Exercise Diary                           SL ER  2x10 2# 2x10 2# 3x10 2# 3x10 2#        Scap 4 2x10 5s hold  2x10 5s hold yellow 2x10 5s hold yellow 2x10 5s hold GR 2x10 5s hold GR        UE alph   1x stand 2x stand 2x stand        Banded pull aparts on foam   GR 20x GR 20x GR 20x        Ecc bicep curl   2x10 5# 2x10 5# 3x10 5#        Biceps stretch   5x 10s door 5x 10s door HEP                                                                                                                                                                                                  Modalities              [facility]                       Assessment:  Patient presents without pain or tenderness at proximal bicep tendon  Patient responded well to ST stretching with increased ROM and no pain with reaching overhead post tx  Tolerated treatment well  Patient would benefit from continued PT  Plan: Continue per plan of care

## 2018-12-11 ENCOUNTER — TRANSCRIBE ORDERS (OUTPATIENT)
Dept: PHYSICAL THERAPY | Facility: MEDICAL CENTER | Age: 53
End: 2018-12-11

## 2018-12-11 DIAGNOSIS — M25.562 ACUTE PAIN OF LEFT KNEE: Primary | ICD-10-CM

## 2018-12-14 ENCOUNTER — APPOINTMENT (OUTPATIENT)
Dept: PHYSICAL THERAPY | Facility: MEDICAL CENTER | Age: 53
End: 2018-12-14
Payer: COMMERCIAL

## 2018-12-17 ENCOUNTER — OFFICE VISIT (OUTPATIENT)
Dept: PHYSICAL THERAPY | Facility: MEDICAL CENTER | Age: 53
End: 2018-12-17
Payer: COMMERCIAL

## 2018-12-17 DIAGNOSIS — M25.511 CHRONIC RIGHT SHOULDER PAIN: Primary | ICD-10-CM

## 2018-12-17 DIAGNOSIS — G89.29 CHRONIC RIGHT SHOULDER PAIN: Primary | ICD-10-CM

## 2018-12-17 PROCEDURE — 97140 MANUAL THERAPY 1/> REGIONS: CPT | Performed by: PHYSICAL THERAPIST

## 2018-12-17 PROCEDURE — 97110 THERAPEUTIC EXERCISES: CPT | Performed by: PHYSICAL THERAPIST

## 2018-12-17 NOTE — PROGRESS NOTES
Daily Note     Today's date: 2018  Patient name: Masha Baig  : 1965  MRN: [id number]  Referring provider: Sapphire Miller DO  Dx:   Encounter Diagnosis     ICD-10-CM    1  Chronic right shoulder pain M25 511     G89 29                   Subjective:  Patient reports that his shoulder feels better  Objective: See treatment diary below    Precautions: None    Daily Treatment Diary     Manual  11/26 11/30 12/3 12/7 12/10 12/17        mobs  AZ AZ AZ AZ AZ       [address] mobs  AZ AZ AZ AZ AZ       Lat/subscap release  AZ AZ AZ                                       Exercise Diary                           SL ER  2x10 2# 2x10 2# 3x10 2# 3x10 2# 3x12 2#       Scap 4 2x10 5s hold  2x10 5s hold yellow 2x10 5s hold yellow 2x10 5s hold GR 2x10 5s hold GR 2x10 5s hold GR       UE alph   1x stand 2x stand 2x stand 3x stand       Banded pull aparts on foam   GR 20x GR 20x GR 20x GR 30x       Ecc bicep curl   2x10 5# 2x10 5# 3x10 5# 3x10 5#       Biceps stretch   5x 10s door 5x 10s door HEP                                                                                                                                                                                                  Modalities              [facility]     AZ AZ                      Assessment:  Masha Baig has been compliant with attending PT and home exercise program since initial eval   Gladys Garber  has made improvements in objective data since initial evalulation and has achieved all goals  Patient reports having returned to their prior level or function  Patient provided with updated Home Exercise Program, all questions answered, verbalized understanding and agreement to plan of care   Thus it was mutually decided to discontinue this episode of care and transition to Home Exercise Program

## 2018-12-20 ENCOUNTER — APPOINTMENT (OUTPATIENT)
Dept: PHYSICAL THERAPY | Facility: MEDICAL CENTER | Age: 53
End: 2018-12-20
Payer: COMMERCIAL

## 2019-01-10 ENCOUNTER — TELEPHONE (OUTPATIENT)
Dept: UROLOGY | Facility: AMBULATORY SURGERY CENTER | Age: 54
End: 2019-01-10

## 2019-01-10 NOTE — TELEPHONE ENCOUNTER
Reason for appointment/Complaint/Diagnosis :  Kidney stones     Insurance: [employer]     History of Cancer?                        If yes, what kind? no     Previous urologist?     no                   Records requested/where? Records in [place] (patient will bring them)     Outside testing/where? No     Location Preference for office visit?  [place]

## 2019-01-14 ENCOUNTER — OFFICE VISIT (OUTPATIENT)
Dept: UROLOGY | Facility: MEDICAL CENTER | Age: 54
End: 2019-01-14
Payer: COMMERCIAL

## 2019-01-14 VITALS
TEMPERATURE: 98.7 F | BODY MASS INDEX: 27.28 KG/M2 | HEART RATE: 78 BPM | WEIGHT: 180 LBS | HEIGHT: 68 IN | SYSTOLIC BLOOD PRESSURE: 124 MMHG | DIASTOLIC BLOOD PRESSURE: 88 MMHG

## 2019-01-14 DIAGNOSIS — N20.1 CALCULUS OF URETER: Primary | ICD-10-CM

## 2019-01-14 DIAGNOSIS — Z80.42 FAMILY HISTORY OF PROSTATE CANCER: ICD-10-CM

## 2019-01-14 PROCEDURE — 99204 OFFICE O/P NEW MOD 45 MIN: CPT | Performed by: UROLOGY

## 2019-01-14 RX ORDER — FAMOTIDINE 10 MG
10 TABLET ORAL 2 TIMES DAILY PRN
COMMUNITY
End: 2019-03-13 | Stop reason: SDUPTHER

## 2019-01-14 RX ORDER — ONDANSETRON 4 MG/1
TABLET, ORALLY DISINTEGRATING ORAL
Refills: 0 | COMMUNITY
Start: 2019-01-10 | End: 2019-03-13

## 2019-01-14 RX ORDER — FLUTICASONE PROPIONATE 50 MCG
2 SPRAY, SUSPENSION (ML) NASAL DAILY PRN
COMMUNITY
End: 2020-06-23 | Stop reason: SDUPTHER

## 2019-01-14 RX ORDER — HYDROCODONE BITARTRATE AND ACETAMINOPHEN 5; 325 MG/1; MG/1
1 TABLET ORAL EVERY 6 HOURS PRN
Refills: 0 | COMMUNITY
Start: 2019-01-10 | End: 2019-03-13

## 2019-01-14 RX ORDER — TAMSULOSIN HYDROCHLORIDE 0.4 MG/1
0.4 CAPSULE ORAL DAILY PRN
Refills: 0 | COMMUNITY
Start: 2019-01-10 | End: 2019-01-15 | Stop reason: HOSPADM

## 2019-01-14 NOTE — PROGRESS NOTES
Assessment/Plan:  1  Pain has been on the not for four days, stone this size has less than 50% chance of passing all the way through  Options discussed, he would like to go ahead and schedule ureteroscopy, laser stone and stent  Potential complications discussed and he will proceed  No problem-specific Assessment & Plan notes found for this encounter  Diagnoses and all orders for this visit:    Calculus of ureter    Family history of prostate cancer    Other orders  -     HYDROcodone-acetaminophen (NORCO) 5-325 mg per tablet; Take 1 tablet by mouth every 6 (six) hours as needed  -     ondansetron (ZOFRAN-ODT) 4 mg disintegrating tablet; TAKE 1 TABLET BY MOUTH EVERY 6 HOURS AS NEEDED FOR NAUSEA/VOMITING  -     tamsulosin (FLOMAX) 0 4 mg; Take 0 4 mg by mouth daily at bedtime  -     famotidine (PEPCID) 10 mg tablet; Take 10 mg by mouth 2 (two) times a day          Subjective:      Patient ID: Elodia Garcia is a 48 y o  male  Four days ago, onset of left renal colic while on business trip to [facility]  ER visit there, 4 5 mm stone distal left ureter, 5 cm above bladder  Pain has been intermittent since that time  No prior history of stones, no signs of infection  Father prostate cancer at age 67        The following portions of the patient's history were reviewed and updated as appropriate: allergies, current medications, past family history, past medical history, past social history, past surgical history and problem list     Review of Systems   All other systems reviewed and are negative  Objective:      /88 (BP Location: Left arm, Patient Position: Sitting, Cuff Size: Adult)   Pulse 78   Temp 98 7 °F (37 1 °C)   Ht 5' 8" (1 727 m)   Wt 81 6 kg (180 lb)   BMI 27 37 kg/m²          Physical Exam   Constitutional: He is oriented to person, place, and time  He appears well-developed and well-nourished  No distress  HENT:   Head: Normocephalic and atraumatic     Eyes: Conjunctivae are normal    Cardiovascular: Normal rate and regular rhythm  Pulmonary/Chest: Effort normal and breath sounds normal  No respiratory distress  He has no wheezes  Abdominal: Soft  Bowel sounds are normal  He exhibits no distension and no mass  There is no tenderness  Neurological: He is alert and oriented to person, place, and time  Skin: Skin is warm and dry  He is not diaphoretic

## 2019-01-14 NOTE — H&P
HISTORY AND PHYSICAL  ? ? Patient Name: Gillian Mejias  Patient MRN: [id number]  Attending Provider: Teresita Nolan MD  Service: Urology  Chief Complaint    Distal left ureteral stone, 4 5 mm         HPI   Gillian Mejias is a 48 y o  male with  left flank pain while on a business trip to [place] last week  CTShowed above stone  Persistent discomfort intermittently despite meds  I plan  cysto, left ureteroscopy, laser stone, stent placement     Potential risks and complications discussed, and informed consent was given by the patient  Medications  Meds/Allergies     No current facility-administered medications for this encounter  Cannot display prior to admission medications because the patient has not been admitted in this contact  No current facility-administered medications for this encounter       Current Outpatient Prescriptions:     famotidine (PEPCID) 10 mg tablet, Take 10 mg by mouth 2 (two) times a day, Disp: , Rfl:     fluticasone (FLONASE) 50 mcg/act nasal spray, 2 sprays into each nostril daily (Patient not taking: Reported on 1/14/2019 ), Disp: 16 g, Rfl: 1    HYDROcodone-acetaminophen (NORCO) 5-325 mg per tablet, Take 1 tablet by mouth every 6 (six) hours as needed, Disp: , Rfl: 0    ketoconazole (NIZORAL) 2 % cream, APPLY TO AFFECTED AREA EVERY DAY FOR 2-4 WEEKS, Disp: , Rfl: 0    ketoconazole (NIZORAL) 2 % shampoo, APPLY TO AFFECTED AREAS AS DIRECTED ONCE DAILY FOR 5 DAYS THEN 2 TO 3 TIMES WEEKLY OR AS NEEDED, Disp: , Rfl: 1    ondansetron (ZOFRAN-ODT) 4 mg disintegrating tablet, TAKE 1 TABLET BY MOUTH EVERY 6 HOURS AS NEEDED FOR NAUSEA/VOMITING, Disp: , Rfl: 0    predniSONE 10 mg tablet, Take 60 mg po day#1, 50 mg po day#2, 40 mg po day#3, 30 mg po day#4, 20 mg po day#5m and 10 mg po day#6 (Patient not taking: Reported on 9/19/2018 ), Disp: 21 tablet, Rfl: 0    rosuvastatin (CRESTOR) 5 mg tablet, Take 1 tablet by mouth daily, Disp: , Rfl:     tamsulosin (FLOMAX) 0 4 mg, Take 0 4 mg by mouth daily at bedtime, Disp: , Rfl: 0  Review of Systems  10 point review of systems negative except as noted in HPI  Allergies  No Known Allergies  PMH  Past Medical History:   Diagnosis Date    Abdominal cramping     Hiatal hernia     Laceration of hand     Multiple benign polyps of large intestine     Pain in joint of right shoulder region     Rectal polyp     Seborrheic dermatitis of scalp      Past surgical history  Past Surgical History:   Procedure Laterality Date    APPENDECTOMY  01/1982    COLONOSCOPY  03/2005    FIBEROPTIC; [id number] -2012 (STASIK)    LASIK      CORNEAL    SEPTOPLASTY  10/1996    VASECTOMY  11/2002    SURGERY VAS DEFERENS     Social history  Social History   Substance Use Topics    Smoking status: Former Smoker    Smokeless tobacco: Former User     Quit date: 1993    Alcohol use Yes      Comment: [address] [place] [address]     ? Physical Exam  General appearance: alert and oriented, in no acute distress  Head: Normocephalic, without obvious abnormality, atraumatic  Neck: no adenopathy, no carotid bruit, no JVD, supple, symmetrical, trachea midline and thyroid not enlarged, symmetric, no tenderness/mass/nodules  Back: symmetric, no curvature  ROM normal  No CVA tenderness    Lungs: clear to auscultation bilaterally  Chest wall: no tenderness  Heart: regular rate and rhythm, S1, S2 normal, no murmur, click, rub or gallop  Abdomen: soft, non-tender; bowel sounds normal; no masses,  no organomegaly  Male genitalia: normal  Extremities: extremities normal, warm and well-perfused; no cyanosis, clubbing, or edema  Lymph nodes: Cervical, supraclavicular, and axillary nodes normal   Neurologic: Grossly normal  Adrain Phalen, MD

## 2019-01-15 ENCOUNTER — HOSPITAL ENCOUNTER (OUTPATIENT)
Dept: RADIOLOGY | Facility: HOSPITAL | Age: 54
Setting detail: OUTPATIENT SURGERY
Discharge: HOME | End: 2019-01-15
Payer: COMMERCIAL

## 2019-01-15 ENCOUNTER — ANESTHESIA EVENT (OUTPATIENT)
Dept: PERIOP | Facility: HOSPITAL | Age: 54
End: 2019-01-15
Payer: COMMERCIAL

## 2019-01-15 ENCOUNTER — HOSPITAL ENCOUNTER (OUTPATIENT)
Facility: HOSPITAL | Age: 54
Setting detail: OUTPATIENT SURGERY
Discharge: HOME | End: 2019-01-15
Attending: UROLOGY | Admitting: UROLOGY
Payer: COMMERCIAL

## 2019-01-15 ENCOUNTER — ANESTHESIA (OUTPATIENT)
Dept: PERIOP | Facility: HOSPITAL | Age: 54
End: 2019-01-15
Payer: COMMERCIAL

## 2019-01-15 VITALS
OXYGEN SATURATION: 95 % | HEART RATE: 83 BPM | DIASTOLIC BLOOD PRESSURE: 75 MMHG | BODY MASS INDEX: 28.14 KG/M2 | SYSTOLIC BLOOD PRESSURE: 128 MMHG | RESPIRATION RATE: 16 BRPM | WEIGHT: 190 LBS | HEIGHT: 69 IN | TEMPERATURE: 98.8 F

## 2019-01-15 DIAGNOSIS — N20.1 CALCULUS OF URETER: ICD-10-CM

## 2019-01-15 PROCEDURE — 82360 CALCULUS ASSAY QUANT: CPT | Performed by: UROLOGY

## 2019-01-15 PROCEDURE — C2617 STENT, NON-COR, TEM W/O DEL: HCPCS | Performed by: UROLOGY

## 2019-01-15 PROCEDURE — 52356 CYSTO/URETERO W/LITHOTRIPSY: CPT | Performed by: UROLOGY

## 2019-01-15 PROCEDURE — 88300 SURGICAL PATH GROSS: CPT | Performed by: PATHOLOGY

## 2019-01-15 PROCEDURE — 76000 FLUOROSCOPY <1 HR PHYS/QHP: CPT

## 2019-01-15 DEVICE — VARIABLE LENGTH INJECTION STENT SET
Type: IMPLANTABLE DEVICE | Site: BLADDER | Status: FUNCTIONAL
Brand: CONTOUR VL™ INJECTION STENT SET

## 2019-01-15 RX ORDER — CEPHALEXIN 500 MG/1
500 CAPSULE ORAL EVERY 12 HOURS SCHEDULED
Qty: 10 CAPSULE | Refills: 0 | Status: SHIPPED | OUTPATIENT
Start: 2019-01-15 | End: 2019-01-20

## 2019-01-15 RX ORDER — CEFAZOLIN SODIUM 2 G/50ML
2000 SOLUTION INTRAVENOUS ONCE
Status: COMPLETED | OUTPATIENT
Start: 2019-01-15 | End: 2019-01-15

## 2019-01-15 RX ORDER — HYDROMORPHONE HCL/PF 1 MG/ML
0.5 SYRINGE (ML) INJECTION
Status: CANCELLED | OUTPATIENT
Start: 2019-01-15

## 2019-01-15 RX ORDER — ONDANSETRON 2 MG/ML
4 INJECTION INTRAMUSCULAR; INTRAVENOUS ONCE AS NEEDED
Status: CANCELLED | OUTPATIENT
Start: 2019-01-15

## 2019-01-15 RX ORDER — OXYCODONE HYDROCHLORIDE AND ACETAMINOPHEN 5; 325 MG/1; MG/1
2 TABLET ORAL EVERY 4 HOURS PRN
Status: DISCONTINUED | OUTPATIENT
Start: 2019-01-15 | End: 2019-01-15 | Stop reason: HOSPADM

## 2019-01-15 RX ORDER — FENTANYL CITRATE/PF 50 MCG/ML
50 SYRINGE (ML) INJECTION
Status: CANCELLED | OUTPATIENT
Start: 2019-01-15

## 2019-01-15 RX ORDER — LIDOCAINE HYDROCHLORIDE 10 MG/ML
INJECTION, SOLUTION INFILTRATION; PERINEURAL AS NEEDED
Status: DISCONTINUED | OUTPATIENT
Start: 2019-01-15 | End: 2019-01-15 | Stop reason: SURG

## 2019-01-15 RX ORDER — PROPOFOL 10 MG/ML
INJECTION, EMULSION INTRAVENOUS AS NEEDED
Status: DISCONTINUED | OUTPATIENT
Start: 2019-01-15 | End: 2019-01-15 | Stop reason: SURG

## 2019-01-15 RX ORDER — HYDROCODONE BITARTRATE AND ACETAMINOPHEN 5; 325 MG/1; MG/1
1-2 TABLET ORAL EVERY 6 HOURS PRN
Qty: 20 TABLET | Refills: 0 | Status: SHIPPED | OUTPATIENT
Start: 2019-01-15 | End: 2019-01-25

## 2019-01-15 RX ORDER — FENTANYL CITRATE 50 UG/ML
INJECTION, SOLUTION INTRAMUSCULAR; INTRAVENOUS AS NEEDED
Status: DISCONTINUED | OUTPATIENT
Start: 2019-01-15 | End: 2019-01-15 | Stop reason: SURG

## 2019-01-15 RX ORDER — MEPERIDINE HYDROCHLORIDE 50 MG/ML
12.5 INJECTION INTRAMUSCULAR; INTRAVENOUS; SUBCUTANEOUS ONCE AS NEEDED
Status: CANCELLED | OUTPATIENT
Start: 2019-01-15

## 2019-01-15 RX ORDER — SODIUM CHLORIDE 9 MG/ML
125 INJECTION, SOLUTION INTRAVENOUS CONTINUOUS
Status: DISCONTINUED | OUTPATIENT
Start: 2019-01-15 | End: 2019-01-15 | Stop reason: HOSPADM

## 2019-01-15 RX ORDER — MIDAZOLAM HYDROCHLORIDE 1 MG/ML
INJECTION INTRAMUSCULAR; INTRAVENOUS AS NEEDED
Status: DISCONTINUED | OUTPATIENT
Start: 2019-01-15 | End: 2019-01-15 | Stop reason: SURG

## 2019-01-15 RX ORDER — MAGNESIUM HYDROXIDE 1200 MG/15ML
LIQUID ORAL AS NEEDED
Status: DISCONTINUED | OUTPATIENT
Start: 2019-01-15 | End: 2019-01-15 | Stop reason: HOSPADM

## 2019-01-15 RX ORDER — SODIUM CHLORIDE 9 MG/ML
INJECTION, SOLUTION INTRAVENOUS AS NEEDED
Status: DISCONTINUED | OUTPATIENT
Start: 2019-01-15 | End: 2019-01-15 | Stop reason: HOSPADM

## 2019-01-15 RX ORDER — OXYCODONE HYDROCHLORIDE AND ACETAMINOPHEN 5; 325 MG/1; MG/1
1 TABLET ORAL EVERY 4 HOURS PRN
Status: DISCONTINUED | OUTPATIENT
Start: 2019-01-15 | End: 2019-01-15 | Stop reason: HOSPADM

## 2019-01-15 RX ORDER — DEXAMETHASONE SODIUM PHOSPHATE 4 MG/ML
INJECTION, SOLUTION INTRA-ARTICULAR; INTRALESIONAL; INTRAMUSCULAR; INTRAVENOUS; SOFT TISSUE AS NEEDED
Status: DISCONTINUED | OUTPATIENT
Start: 2019-01-15 | End: 2019-01-15 | Stop reason: SURG

## 2019-01-15 RX ORDER — ONDANSETRON 2 MG/ML
INJECTION INTRAMUSCULAR; INTRAVENOUS AS NEEDED
Status: DISCONTINUED | OUTPATIENT
Start: 2019-01-15 | End: 2019-01-15 | Stop reason: SURG

## 2019-01-15 RX ADMIN — OXYCODONE HYDROCHLORIDE AND ACETAMINOPHEN 1 TABLET: 5; 325 TABLET ORAL at 16:00

## 2019-01-15 RX ADMIN — SODIUM CHLORIDE 125 ML/HR: 0.9 INJECTION, SOLUTION INTRAVENOUS at 12:01

## 2019-01-15 RX ADMIN — DEXAMETHASONE SODIUM PHOSPHATE 4 MG: 4 INJECTION, SOLUTION INTRAMUSCULAR; INTRAVENOUS at 14:03

## 2019-01-15 RX ADMIN — SODIUM CHLORIDE 125 ML/HR: 0.9 INJECTION, SOLUTION INTRAVENOUS at 14:50

## 2019-01-15 RX ADMIN — MIDAZOLAM 2 MG: 1 INJECTION INTRAMUSCULAR; INTRAVENOUS at 13:49

## 2019-01-15 RX ADMIN — ONDANSETRON 4 MG: 2 INJECTION INTRAMUSCULAR; INTRAVENOUS at 14:03

## 2019-01-15 RX ADMIN — CEFAZOLIN SODIUM 2000 MG: 2 SOLUTION INTRAVENOUS at 13:55

## 2019-01-15 RX ADMIN — LIDOCAINE HYDROCHLORIDE 80 MG: 10 INJECTION, SOLUTION INFILTRATION; PERINEURAL at 13:49

## 2019-01-15 RX ADMIN — FENTANYL CITRATE 100 MCG: 50 INJECTION, SOLUTION INTRAMUSCULAR; INTRAVENOUS at 13:49

## 2019-01-15 RX ADMIN — PROPOFOL 200 MG: 10 INJECTION, EMULSION INTRAVENOUS at 13:49

## 2019-01-15 NOTE — DISCHARGE INSTRUCTIONS
ALL YOUR  PREVIOUS MEDS ARE THE SAME  NEW MEDS:  Cephalexin antibiotic, hydrocodone if needed for pain    BE CAREFUL NOT TO PULL THE STRING  EXPECT SOME BLOOD IN URINE, BURNING, FREQUENT URINATION

## 2019-01-15 NOTE — ANESTHESIA PREPROCEDURE EVALUATION
Review of Systems/Medical History  Patient summary reviewed  Chart reviewed      Cardiovascular  Hyperlipidemia,    Pulmonary  Negative pulmonary ROS        GI/Hepatic    GERD well controlled,  Hiatal hernia,        Negative  ROS        Endo/Other  Negative endo/other ROS      GYN       Hematology  Negative hematology ROS      Musculoskeletal  Negative musculoskeletal ROS        Neurology  Negative neurology ROS      Psychology   Negative psychology ROS              Physical Exam    Airway    Mallampati score: II  TM Distance: >3 FB  Neck ROM: full     Dental   No notable dental hx     Cardiovascular  Rhythm: regular, Rate: normal, Cardiovascular exam normal    Pulmonary  Pulmonary exam normal Breath sounds clear to auscultation,     Other Findings        Anesthesia Plan  ASA Score- 2     Anesthesia Type- general with ASA Monitors  Additional Monitors:   Airway Plan: LMA  Plan Factors-Patient not instructed to abstain from smoking on day of procedure  Patient did not smoke on day of surgery  Induction- intravenous  Postoperative Plan-     Informed Consent- Anesthetic plan and risks discussed with patient

## 2019-01-15 NOTE — OP NOTE
OPERATIVE REPORT  PATIENT NAME: Deborah Zarate    :  1965  MRN: [id number]  Pt Location: [facility] OR ROOM 04    SURGERY DATE: 1/15/2019    Surgeon(s) and Role:     Mal Carmichael MD - Primary    Preop Diagnosis:  Calculus of ureter [N20 1]    Post-Op Diagnosis Codes:     * Calculus of ureter [N20 1]    Procedure(s) (LRB):  CYSTO, URETEROSCOPY W/HOLMIUM LASER, RETROGRADE PYELOGRAM, STENT INSERTION (Left)    Specimen(s):  ID Type Source Tests Collected by Time Destination   1 : Left ureteral stone  Tissue Ureter, Left TISSUE EXAM Dimple Edmond MD 1/15/2019 1421        Estimated Blood Loss:   Minimal    Drains:  Ureteral Drain/Stent Left ureter 6 Fr  (Active)   Number of days: 0       Anesthesia Type:   General    Operative Indications:  Calculus of ureter [N20 1]      Operative Findings:  5 millimeter stone distal left ureter with inflamed ureter    Complications:   None    Procedure and Technique:      PLAN FOR STENT:  Remove by nurse next Monday with string      The patient was brought into the OR, properly identified and positioned on the table  General anesthesia was induced, and he was prepped using betadine solution and draped in lithotomy position  The 22F scope was introduced in the urethra, which showed no strictures  The prostate had moderate hyperplasia, there was some  median lobe enlargement  The bladder was inspected and had no lesions, stones, or tumors  There were mild trabeculations  The left ureteral orifice was identified and retrograde pyelogram performed, showing stone  3 centimeters above the bladder  One wire placed up the ureter  The rigid ureteroscope was introduced and carefully advanced up to stone  The Holmium laser fiber was introduced thru the scope and advanced to the edge of stone  Using various laser settings, stone was lasered into numerous small particles  Care was taken not to laser tissue  Several large particles were basketed    All other remaining particles appeared small enough to pass around the stent  The scope was removed from the ureter, and the cystoscope was used to place a 6F Contour VL stent in the ureter, with the upper coils in the renal pelvis, and the distal coil in the bladder  Retrograde pyelogram on that side confirmed good position and no extravasation of contrast    The patient was awaken from anesthesia and taken to the PACU in good condition        I was present for the entire procedure    Patient Disposition:  PACU     SIGNATURE: Dimple Edmond MD  DATE: January 15, 2019  TIME: 2:41 PM

## 2019-01-15 NOTE — DISCHARGE SUMMARY
Discharge Summary - Coleen Caraballo 48 y o  male MRN: [id number]    Admission Date: 1/15/2019     Admitting Diagnosis: Calculus of ureter [N20 1]    Procedures Performed:  Left ureteroscopy, laser stone, stent    Patient underwent planned outpt surgery and recovered without complication  Discharged in good condition  Medications were prescribed  Pt knows to call the office for followup in 6 days

## 2019-01-16 ENCOUNTER — TELEPHONE (OUTPATIENT)
Dept: UROLOGY | Facility: MEDICAL CENTER | Age: 54
End: 2019-01-16

## 2019-01-16 ENCOUNTER — LAB REQUISITION (OUTPATIENT)
Dept: LAB | Facility: HOSPITAL | Age: 54
End: 2019-01-16
Payer: COMMERCIAL

## 2019-01-16 DIAGNOSIS — N20.1 CALCULUS OF URETER: ICD-10-CM

## 2019-01-17 ENCOUNTER — TELEPHONE (OUTPATIENT)
Dept: UROLOGY | Facility: MEDICAL CENTER | Age: 54
End: 2019-01-17

## 2019-01-17 NOTE — TELEPHONE ENCOUNTER
Pt notified normal with a stent  Urine is pink tinge  Pt denies fever or chills  Pt instructed to call if symptoms worsen

## 2019-01-17 NOTE — TELEPHONE ENCOUNTER
Patient had surgery on 01/15/2019 and is still having blood in his urine  Patient wanted to know if this is normal  Patient has no fever or chills    Please advise

## 2019-01-21 ENCOUNTER — PROCEDURE VISIT (OUTPATIENT)
Dept: UROLOGY | Facility: MEDICAL CENTER | Age: 54
End: 2019-01-21
Payer: COMMERCIAL

## 2019-01-21 VITALS
WEIGHT: 191 LBS | SYSTOLIC BLOOD PRESSURE: 120 MMHG | DIASTOLIC BLOOD PRESSURE: 70 MMHG | HEIGHT: 69 IN | BODY MASS INDEX: 28.29 KG/M2 | HEART RATE: 85 BPM

## 2019-01-21 DIAGNOSIS — N20.1 CALCULUS OF URETER: Primary | ICD-10-CM

## 2019-01-21 LAB
CA PHOS MFR STONE: 3 %
COLOR STONE: NORMAL
COM MFR STONE: 97 %
COMMENT-STONE3: NORMAL
COMPOSITION: NORMAL
LABORATORY COMMENT REPORT: NORMAL
NIDUS STONE QL: NORMAL
PHOTO: NORMAL
SIZE STONE: NORMAL MM
STONE ANALYSIS-IMP: NORMAL
STONE ANALYSIS-IMP: NORMAL
WT STONE: 1.2 MG

## 2019-01-21 PROCEDURE — 99211 OFF/OP EST MAY X REQ PHY/QHP: CPT | Performed by: UROLOGY

## 2019-01-21 NOTE — PROGRESS NOTES
The patient was placed in the supine position  The stent was gently removed in its entirety by using the string which was present at the urethral meatus  The procedure was well-tolerated and without complications  Instructions were given to call the office immediately for bloody urine, difficulty urinating, urinary retention, painful or frequent urination, fever, chills, nausea, vomiting or other illness  The patient stated that he understood these instructions and would comply with them  Order for 24 hr stone risk profile was given to pt  He uses [employer]

## 2019-02-14 ENCOUNTER — TELEPHONE (OUTPATIENT)
Dept: UROLOGY | Facility: MEDICAL CENTER | Age: 54
End: 2019-02-14

## 2019-02-14 NOTE — TELEPHONE ENCOUNTER
----- Message from Portia Motta MD sent at 2/14/2019  7:27 AM EST -----   Call [facility]   End of the report says no suitable specimen received  What?  ----- Message -----  From: Luca, Transcription Incoming  Sent: 2/11/2019  11:12 PM  To:  Portia Motta MD

## 2019-02-15 NOTE — TELEPHONE ENCOUNTER
Jacquie Ramos from [address] [place] [address] called inquiring on which stone risk profile to order  They spoke to office on 1/7 and were told to order test # [id number]  Had her read what components are included and compared to the standard order previously place by physicians  Comparable  They will instruct pt on collection

## 2019-02-26 ENCOUNTER — TELEPHONE (OUTPATIENT)
Dept: UROLOGY | Facility: MEDICAL CENTER | Age: 54
End: 2019-02-26

## 2019-02-26 NOTE — TELEPHONE ENCOUNTER
MD Lilo [address]ao [address]  For Urology [place] [facility]             This is the second 24 hour urine I have seen, from [facility], without any results  Is patient getting the proper container etc?            Spoke with Linsey Stone from [employer]; I asked that he look at the patient's last two stone risk profile results and see if he's able to figure out why we haven't been getting the results  He states that their labs to multiple types of 24 hour urine tests, but the lab has been doing the wrong one  We are ordering the 24 hour urine for stone risk (code: 442), but they're testing it differently using another code ([id number]:29931)  I will forward this to the ordering physician and see if he'd like the test done a third time

## 2019-02-28 NOTE — TELEPHONE ENCOUNTER
I don't trust [facility] to do it properly    Can he do at [facility], unless more expensive for him

## 2019-03-04 DIAGNOSIS — N20.1 URETER, CALCULUS: Primary | ICD-10-CM

## 2019-03-11 ENCOUNTER — OFFICE VISIT (OUTPATIENT)
Dept: UROLOGY | Facility: MEDICAL CENTER | Age: 54
End: 2019-03-11
Payer: COMMERCIAL

## 2019-03-11 VITALS
BODY MASS INDEX: 28.14 KG/M2 | SYSTOLIC BLOOD PRESSURE: 126 MMHG | HEART RATE: 79 BPM | HEIGHT: 69 IN | WEIGHT: 190 LBS | DIASTOLIC BLOOD PRESSURE: 74 MMHG

## 2019-03-11 DIAGNOSIS — N13.8 BPH WITH URINARY OBSTRUCTION: ICD-10-CM

## 2019-03-11 DIAGNOSIS — N20.1 CALCULUS OF URETER: ICD-10-CM

## 2019-03-11 DIAGNOSIS — N40.1 BPH WITH URINARY OBSTRUCTION: ICD-10-CM

## 2019-03-11 DIAGNOSIS — Z80.42 FAMILY HISTORY OF PROSTATE CANCER: Primary | ICD-10-CM

## 2019-03-11 PROCEDURE — 99214 OFFICE O/P EST MOD 30 MIN: CPT | Performed by: UROLOGY

## 2019-03-11 NOTE — PROGRESS NOTES
Assessment/Plan:  1  Doing well after stone procedure  2  No specific dietary recommendations based on 20/4 urine  3  Oxalate foods to avoid advice given  4  This was his first stone, no need for routine x-rays, he will come back if needed  PSA 1 5, he will get that checked yearly at family doctor  No problem-specific Assessment & Plan notes found for this encounter  Diagnoses and all orders for this visit:    Family history of prostate cancer          Subjective:      Patient ID: Jeramy Decker is a 47 y o  male  Now six weeks out from ureteroscopy, laser stone, stent  Has done quite well after stent out no significant pain  Stone was calcium oxalate on analysis  Twenty-four urine showed good volume of 1 8 L, normal minerals  CT had described large prostate, at my cysto, I saw large median lobe but nothing more than that, and he does not have much symptoms  Father had prostate cancer in his 76s  The following portions of the patient's history were reviewed and updated as appropriate: allergies, current medications, past family history, past medical history, past social history, past surgical history and problem list     Review of Systems   All other systems reviewed and are negative  Objective:      /74   Pulse 79   Ht 5' 9" (1 753 m)   Wt 86 2 kg (190 lb)   BMI 28 06 kg/m²          Physical Exam   Constitutional: He is oriented to person, place, and time  He appears well-developed and well-nourished  No distress  HENT:   Head: Normocephalic and atraumatic  Eyes: Conjunctivae are normal    Cardiovascular: Normal rate and regular rhythm  Pulmonary/Chest: Effort normal and breath sounds normal  No respiratory distress  He has no wheezes  Abdominal: Soft  Bowel sounds are normal  He exhibits no distension and no mass  There is no tenderness  Neurological: He is alert and oriented to person, place, and time  Skin: Skin is warm and dry  He is not diaphoretic

## 2019-03-11 NOTE — PATIENT INSTRUCTIONS
AVOID OXALATE FOODS:  Coke and Pepsi  Nuts  Spinach and Kale  Blueberries  Dark tea    DRINK 3 QUARTS (96 Oz ) LIQUIDS EACH DAY - ALL LIQUIDS COUNT    ADD LEMON JUICE 3 OZ   DAILY   - LEMONADE:  ONE OUNCE JUICE, GLASS OF WATER, AND SWEETENER

## 2019-03-13 ENCOUNTER — OFFICE VISIT (OUTPATIENT)
Dept: FAMILY MEDICINE CLINIC | Facility: CLINIC | Age: 54
End: 2019-03-13
Payer: COMMERCIAL

## 2019-03-13 VITALS
BODY MASS INDEX: 27.02 KG/M2 | HEIGHT: 71 IN | SYSTOLIC BLOOD PRESSURE: 120 MMHG | WEIGHT: 193 LBS | DIASTOLIC BLOOD PRESSURE: 80 MMHG

## 2019-03-13 DIAGNOSIS — E78.2 MIXED HYPERLIPIDEMIA: ICD-10-CM

## 2019-03-13 DIAGNOSIS — M25.511 CHRONIC RIGHT SHOULDER PAIN: ICD-10-CM

## 2019-03-13 DIAGNOSIS — K21.00 ESOPHAGITIS, REFLUX: ICD-10-CM

## 2019-03-13 DIAGNOSIS — E78.00 PURE HYPERCHOLESTEROLEMIA: Primary | ICD-10-CM

## 2019-03-13 DIAGNOSIS — E78.00 PURE HYPERCHOLESTEROLEMIA: ICD-10-CM

## 2019-03-13 DIAGNOSIS — G89.29 CHRONIC RIGHT SHOULDER PAIN: ICD-10-CM

## 2019-03-13 DIAGNOSIS — R79.89 LOW TESTOSTERONE: ICD-10-CM

## 2019-03-13 DIAGNOSIS — Z00.00 HEALTHCARE MAINTENANCE: Primary | ICD-10-CM

## 2019-03-13 PROCEDURE — 99396 PREV VISIT EST AGE 40-64: CPT | Performed by: FAMILY MEDICINE

## 2019-03-13 RX ORDER — ROSUVASTATIN CALCIUM 5 MG/1
5 TABLET, COATED ORAL DAILY
Qty: 90 TABLET | Refills: 3 | Status: SHIPPED | OUTPATIENT
Start: 2019-03-13 | End: 2019-03-13 | Stop reason: SDUPTHER

## 2019-03-13 RX ORDER — ASPIRIN 81 MG/1
81 TABLET ORAL DAILY
COMMUNITY
End: 2019-09-08 | Stop reason: ALTCHOICE

## 2019-03-13 RX ORDER — FAMOTIDINE 20 MG/1
20 TABLET, FILM COATED ORAL 2 TIMES DAILY PRN
Qty: 180 TABLET | Refills: 3 | Status: SHIPPED | OUTPATIENT
Start: 2019-03-13 | End: 2020-05-18

## 2019-03-14 RX ORDER — ROSUVASTATIN CALCIUM 5 MG/1
TABLET, COATED ORAL
Qty: 180 TABLET | Refills: 0 | Status: SHIPPED | OUTPATIENT
Start: 2019-03-14 | End: 2020-02-25

## 2019-03-15 NOTE — PROGRESS NOTES
Assessment/Plan:      Health care maintenance completed  LDL cholesterol at goal   Continue to work on risk factor modification  Triglycerides just above 150  Patient is stable on famotidine for his esophagitis  Recheck testosterone again next visit  Patient just barely below normal   Patient has been through orthopedic evaluation and physical therapy  Still having right shoulder pain  Recommend MRI and follow-up with orthopedic surgeon  Otherwise recheck 6 months  Colonoscopy is reviewed  Patient to schedule  Diagnoses and all orders for this visit:    Healthcare maintenance    Mixed hyperlipidemia  -     Comprehensive metabolic panel; Future  -     Lipid panel; Future  -     TSH, 3rd generation; Future    Esophagitis, reflux  -     famotidine (PEPCID) 20 mg tablet; Take 1 tablet (20 mg total) by mouth 2 (two) times a day as needed for heartburn    Low testosterone  -     Testosterone, free, total; Future    Chronic right shoulder pain  -     MRI shoulder right wo contrast; Future    Other orders  -     aspirin (ECOTRIN LOW STRENGTH) 81 mg EC tablet; Take 81 mg by mouth daily        Lifestyle Advice: follow a low fat, low cholesterol diet          Subjective:      Patient ID: Med Montaño is a 47 y o  male  Patient here for yearly physical   Here today with wife  Has been Orthopedics for shoulder pain  Has done physical therapy  Still not any better  X-ray already completed  Asking for MRI  Diet: well balanced diet  Exercise: intermittently  Dental: regular dental visits  Vision: no vision problems  Preventative Health:  Male Preventative: Cholesterol screening up to date    The following portions of the patient's history were reviewed and updated as appropriate: allergies, current medications, past family history, past medical history, past social history, past surgical history and problem list     Review of Systems   Constitutional: Negative for appetite change, fatigue, fever and unexpected weight change  HENT: Negative for congestion, ear pain, postnasal drip, rhinorrhea, sinus pressure, sinus pain and sore throat  Eyes: Negative for redness and visual disturbance  Respiratory: Negative for chest tightness and shortness of breath  Cardiovascular: Negative for chest pain, palpitations and leg swelling  Gastrointestinal: Negative for abdominal distention, abdominal pain, diarrhea and nausea  Endocrine: Negative for cold intolerance and heat intolerance  Genitourinary: Negative for dysuria and hematuria  Musculoskeletal: Negative for arthralgias, gait problem and myalgias  Refractory right shoulder pain  Skin: Negative for pallor and rash  Neurological: Negative for dizziness and headaches  Psychiatric/Behavioral: Negative for behavioral problems  The patient is not nervous/anxious  Objective:    /80 (BP Location: Left arm, Patient Position: Sitting, Cuff Size: Standard)   Ht 5' 10 5" (1 791 m)   Wt 87 5 kg (193 lb)   BMI 27 30 kg/m²          Physical Exam   Constitutional: He is oriented to person, place, and time  He appears well-nourished  No distress  HENT:   Head: Normocephalic and atraumatic  Right Ear: Tympanic membrane and external ear normal    Left Ear: Tympanic membrane and external ear normal    Nose: Nose normal    Mouth/Throat: Oropharynx is clear and moist    Eyes: Pupils are equal, round, and reactive to light  Conjunctivae and EOM are normal  No scleral icterus  Neck: Normal range of motion  Neck supple  No thyromegaly present  Cardiovascular: Normal rate, regular rhythm and intact distal pulses  No murmur heard  Pulses:       Carotid pulses are 0 on the right side, and 0 on the left side  Pulmonary/Chest: Effort normal and breath sounds normal  He has no wheezes  Abdominal: Soft  Bowel sounds are normal  He exhibits no distension and no mass  There is no tenderness  Musculoskeletal: He exhibits no edema  Reproducible pain with right rotator cuff maneuvers  Slight decreased range of motion  Lymphadenopathy:     He has no cervical adenopathy  Neurological: He is alert and oriented to person, place, and time  He has normal reflexes  No cranial nerve deficit  Coordination normal    Skin: Skin is warm  No pallor  Psychiatric: He has a normal mood and affect  His behavior is normal  Thought content normal    Nursing note and vitals reviewed  Patient has no known allergies  Munir Leon had no medications administered during this visit    Health Maintenance   Topic Date Due    BMI: Followup Plan  1983    Hepatitis C Screening  2020 (Originally 1965)    CRC Screening: Colonoscopy  2019    Depression Screening PHQ  2019    BMI: Adult  2020    DTaP,Tdap,and Td Vaccines (2 - Td) 2023    INFLUENZA VACCINE  Completed    HEPATITIS B VACCINES  Aged Out      Social History     Socioeconomic History    Marital status: /Civil Union     Spouse name: Not on file    Number of children: Not on file    Years of education: Not on file    Highest education level: Not on file   Occupational History    Not on file   Social Needs    Financial resource strain: Not on file    Food insecurity:     Worry: Not on file     Inability: Not on file    Transportation needs:     Medical: Not on file     Non-medical: Not on file   Tobacco Use    Smoking status: Former Smoker     Last attempt to quit:      Years since quittin 2    Smokeless tobacco: Former User     Quit date:    Substance and Sexual Activity    Alcohol use: Yes     Comment: 3 x weekly    Drug use: No    Sexual activity: Not on file   Lifestyle    Physical activity:     Days per week: Not on file     Minutes per session: Not on file    Stress: Not on file   Relationships    Social connections:     Talks on phone: Not on file     Gets together: Not on file     Attends Orthodox service: Not on file     Active member of club or organization: Not on file     Attends meetings of clubs or organizations: Not on file     Relationship status: Not on file    Intimate partner violence:     Fear of current or ex partner: Not on file     Emotionally abused: Not on file     Physically abused: Not on file     Forced sexual activity: Not on file   Other Topics Concern    Not on file   Social History Narrative    Not on file      Family History   Problem Relation Age of Onset    Other Mother         BACK PAIN    Hypertension Mother     Melanoma Father         MALIGNANT MELANOMA OF THE SKIN    Prostate cancer Father     Osteoporosis Brother       Past Medical History:   Diagnosis Date    Calculus, ureter     Enlarged prostate     GERD (gastroesophageal reflux disease)     Hiatal hernia     Hyperlipidemia     Kidney stone     Multiple benign polyps of large intestine     Pain in joint of right shoulder region     Psoriasis     hands and scalp    Rectal polyp     Seborrheic dermatitis of scalp     Wears glasses       has a past surgical history that includes Appendectomy (01/1982); Colonoscopy (03/2005); LASIK; Septoplasty (10/1996); Vasectomy (11/2002); and pr cysto/uretero w/lithotripsy &indwell stent insrt (Left, 1/15/2019)     Patient Active Problem List    Diagnosis Date Noted    Calculus of ureter 01/14/2019    Family history of prostate cancer 01/14/2019    Chronic right shoulder pain 11/26/2018    Health care maintenance 02/08/2018    Psoriasis 09/19/2016    BPH with urinary obstruction 12/19/2014    Esophagitis, reflux 12/19/2014    Hyperlipidemia 06/13/2012       Current Outpatient Medications:     aspirin (ECOTRIN LOW STRENGTH) 81 mg EC tablet, Take 81 mg by mouth daily, Disp: , Rfl:     famotidine (PEPCID) 20 mg tablet, Take 1 tablet (20 mg total) by mouth 2 (two) times a day as needed for heartburn, Disp: 180 tablet, Rfl: 3    fluticasone (FLONASE) 50 mcg/act nasal spray, 2 sprays into each nostril daily as needed for rhinitis, Disp: , Rfl:     ketoconazole (NIZORAL) 2 % shampoo, APPLY TO AFFECTED AREAS AS DIRECTED ONCE DAILY FOR 5 DAYS THEN 2 TO 3 TIMES WEEKLY OR AS NEEDED, Disp: , Rfl: 1    rosuvastatin (CRESTOR) 5 mg tablet, TAKE 1 TABLET DAILY  , Disp: 180 tablet, Rfl: 0          BMI Counseling: Body mass index is 27 3 kg/m²  Discussed the patient's BMI with him  The BMI is above average  BMI counseling and education was provided to the patient  Nutrition recommendations include decreasing overall calorie intake  Exercise recommendations include exercising 3-5 times per week

## 2019-03-15 NOTE — PATIENT INSTRUCTIONS
Low Fat Diet   AMBULATORY CARE:   A low-fat diet  is an eating plan that is low in total fat, unhealthy fat, and cholesterol  You may need to follow a low-fat diet if you have trouble digesting or absorbing fat  You may also need to follow this diet if you have high cholesterol  You can also lower your cholesterol by increasing the amount of fiber in your diet  Soluble fiber is a type of fiber that helps to decrease cholesterol levels  Different types of fat in food:   · Limit unhealthy fats  A diet that is high in cholesterol, saturated fat, and trans fat may cause unhealthy cholesterol levels  Unhealthy cholesterol levels increase your risk of heart disease  ¨ Cholesterol:  Limit intake of cholesterol to less than 200 mg per day  Cholesterol is found in meat, eggs, and dairy  ¨ Saturated fat:  Limit saturated fat to less than 7% of your total daily calories  Ask your dietitian how many calories you need each day  Saturated fat is found in butter, cheese, ice cream, whole milk, and palm oil  Saturated fat is also found in meat, such as beef, pork, chicken skin, and processed meats  Processed meats include sausage, hot dogs, and bologna  ¨ Trans fat:  Avoid trans fat as much as possible  Trans fat is used in fried and baked foods  Foods that say trans fat free on the label may still have up to 0 5 grams of trans fat per serving  · Include healthy fats  Replace foods that are high in saturated and trans fat with foods high in healthy fats  This may help to decrease high cholesterol levels  ¨ Monounsaturated fats: These are found in avocados, nuts, and vegetable oils, such as olive, canola, and sunflower oil  ¨ Polyunsaturated fats: These can be found in vegetable oils, such as soybean or corn oil  Omega-3 fats can help to decrease the risk of heart disease  Omega-3 fats are found in fish, such as salmon, herring, trout, and tuna   Omega-3 fats can also be found in plant foods, such as walnuts, flaxseed, soybeans, and canola oil    Foods to limit or avoid:   · Grains:      ¨ Snacks that are made with partially hydrogenated oils, such as chips, regular crackers, and butter-flavored popcorn    ¨ High-fat baked goods, such as biscuits, croissants, doughnuts, pies, cookies, and pastries    · Dairy:      ¨ Whole milk, 2% milk, and yogurt and ice cream made with whole milk    ¨ Half and half creamer, heavy cream, and whipping cream    ¨ Cheese, cream cheese, and sour cream    · Meats and proteins:      ¨ High-fat cuts of meat (T-bone steak, regular hamburger, and ribs)    ¨ Fried meat, poultry (turkey and chicken), and fish    ¨ Poultry (chicken and turkey) with skin    ¨ Cold cuts (salami or bologna), hot dogs, ochoa, and sausage    ¨ Whole eggs and egg yolks    · Vegetables and fruits with added fat:      ¨ Fried vegetables or vegetables in butter or high-fat sauces, such as cream or cheese sauces    ¨ Fried fruit or fruit served with butter or cream    · Fats:      ¨ Butter, stick margarine, and shortening    ¨ Coconut, palm oil, and palm kernel oil  Foods to include:   · Grains:      ¨ Whole-grain breads, cereals, pasta, and brown rice    ¨ Low-fat crackers and pretzels    · Vegetables and fruits:      ¨ Fresh, frozen, or canned vegetables (no salt or low-sodium)    ¨ Fresh, frozen, dried, or canned fruit (canned in light syrup or fruit juice)    ¨ Avocado    · Low-fat dairy products:      ¨ Nonfat (skim) or 1% milk    ¨ Nonfat or low-fat cheese, yogurt, and cottage cheese    · Meats and proteins:      ¨ Chicken or turkey with no skin    ¨ Baked or broiled fish    ¨ Lean beef and pork (loin, round, extra lean hamburger)    ¨ Beans and peas, unsalted nuts, soy products    ¨ Egg whites and substitutes    ¨ Seeds and nuts    · Fats:      ¨ Unsaturated oil, such as canola, olive, peanut, soybean, or sunflower oil    ¨ Soft or liquid margarine and vegetable oil spread    ¨ Low-fat salad dressing  Other ways to decrease fat:   · Read food labels before you buy foods  Choose foods that have less than 30% of calories from fat  Choose low-fat or fat-free dairy products  Remember that fat free does not mean calorie free  These foods still contain calories, and too many calories can lead to weight gain  · Trim fat from meat and avoid fried food  Trim all visible fat from meat before you cook it  Remove the skin from poultry  Do not martin meat, fish, or poultry  Bake, roast, boil, or broil these foods instead  Avoid fried foods  Eat a baked potato instead of [place] fries  Steam vegetables instead of sautéing them in butter  · Add less fat to foods  Use imitation ochoa bits on salads and baked potatoes instead of regular ochoa bits  Use fat-free or low-fat salad dressings instead of regular dressings  Use low-fat or nonfat butter-flavored topping instead of regular butter or margarine on popcorn and other foods  Ways to decrease fat in recipes:  Replace high-fat ingredients with low-fat or nonfat ones  This may cause baked goods to be drier than usual  You may need to use nonfat cooking spray on pans to prevent food from sticking  You also may need to change the amount of other ingredients, such as water, in the recipe  Try the following:  · Use low-fat or light margarine instead of regular margarine or shortening  · Use lean ground turkey breast or chicken, or lean ground beef (less than 5% fat) instead of hamburger  · Add 1 teaspoon of canola oil to 8 ounces of skim milk instead of using cream or half and half  · Use grated zucchini, carrots, or apples in breads instead of coconut  · Use blenderized, low-fat cottage cheese, plain tofu, or low-fat ricotta cheese instead of cream cheese  · Use 1 egg white and 1 teaspoon of canola oil, or use ¼ cup (2 ounces) of fat-free egg substitute instead of a whole egg       · Replace half of the oil that is called for in a recipe with applesauce when you bake  Use 3 tablespoons of cocoa powder and 1 tablespoon of canola oil instead of a square of baking chocolate  How to increase fiber:  Eat enough high-fiber foods to get 20 to 30 grams of fiber every day  Slowly increase your fiber intake to avoid stomach cramps, gas, and other problems  · Eat 3 ounces of whole-grain foods each day  An ounce is about 1 slice of bread  Eat whole-grain breads, such as whole-wheat bread  Whole wheat, whole-wheat flour, or other whole grains should be listed as the first ingredient on the food label  Replace white flour with whole-grain flour or use half of each in recipes  Whole-grain flour is heavier than white flour, so you may have to add more yeast or baking powder  · Eat a high-fiber cereal for breakfast   Oatmeal is a good source of soluble fiber  Look for cereals that have bran or fiber in the name  Choose whole-grain products, such as brown rice, barley, and whole-wheat pasta  · Eat more beans, peas, and lentils  For example, add beans to soups or salads  Eat at least 5 cups of fruits and vegetables each day  Eat fruits and vegetables with the peel because the peel is high in fiber  © 2017 [address] [employer] Information is for End User's use only and may not be sold, redistributed or otherwise used for commercial purposes  All illustrations and images included in CareNotes® are the copyrighted property of [employer]  or Ryan Melendez  The above information is an  only  It is not intended as medical advice for individual conditions or treatments  Talk to your doctor, nurse or pharmacist before following any medical regimen to see if it is safe and effective for you  Heart Healthy Diet   AMBULATORY CARE:   A heart healthy diet  is an eating plan low in total fat, unhealthy fats, and sodium (salt)  A heart healthy diet helps decrease your risk for heart disease and stroke   Limit the amount of fat you eat to 25% to 35% of your total daily calories  Limit sodium to less than 2,300 mg each day  Healthy fats:  Healthy fats can help improve cholesterol levels  The risk for heart disease is decreased when cholesterol levels are normal  Choose healthy fats, such as the following:  · Unsaturated fat  is found in foods such as soybean, canola, olive, corn, and safflower oils  It is also found in soft tub margarine that is made with liquid vegetable oil  · Omega-3 fat  is found in certain fish, such as salmon, tuna, and trout, and in walnuts and flaxseed  Unhealthy fats:  Unhealthy fats can cause unhealthy cholesterol levels in your blood and increase your risk of heart disease  Limit unhealthy fats, such as the following:  · Cholesterol  is found in animal foods, such as eggs and lobster, and in dairy products made from whole milk  Limit cholesterol to less than 300 milligrams (mg) each day  You may need to limit cholesterol to 200 mg each day if you have heart disease  · Saturated fat  is found in meats, such as ochoa and hamburger  It is also found in chicken or turkey skin, whole milk, and butter  Limit saturated fat to less than 7% of your total daily calories  Limit saturated fat to less than 6% if you have heart disease or are at increased risk for it  · Trans fat  is found in packaged foods, such as potato chips and cookies  It is also in hard margarine, some fried foods, and shortening  Avoid trans fats as much as possible    Heart healthy foods and drinks to include:  Ask your dietitian or healthcare provider how many servings to have from each of the following food groups:  · Grains:      ¨ Whole-wheat breads, cereals, and pastas, and brown rice    ¨ Low-fat, low-sodium crackers and chips    · Vegetables:      ¨ Broccoli, green beans, green peas, and spinach    ¨ Collards, kale, and lima beans    ¨ Carrots, sweet potatoes, tomatoes, and peppers    ¨ Canned vegetables with no salt added    · Fruits:      ¨ Bananas, peaches, pears, and pineapple    ¨ Grapes, raisins, and dates    ¨ Oranges, tangerines, grapefruit, orange juice, and grapefruit juice    ¨ Apricots, mangoes, melons, and papaya    ¨ Raspberries and strawberries    ¨ Canned fruit with no added sugar    · Low-fat dairy products:      ¨ Nonfat (skim) milk, 1% milk, and low-fat almond, cashew, or soy milks fortified with calcium    ¨ Low-fat cheese, regular or frozen yogurt, and cottage cheese    · Meats and proteins , such as lean cuts of beef and pork (loin, leg, round), skinless chicken and turkey, legumes, soy products, egg whites, and nuts  Foods and drinks to limit or avoid:  Ask your dietitian or healthcare provider about these and other foods that are high in unhealthy fat, sodium, and sugar:  · Snack or packaged foods , such as frozen dinners, cookies, macaroni and cheese, and cereals with more than 300 mg of sodium per serving    · Canned or dry mixes  for cakes, soups, sauces, or gravies    · Vegetables with added sodium , such as instant potatoes, vegetables with added sauces, or regular canned vegetables    · Other foods high in sodium , such as ketchup, barbecue sauce, salad dressing, pickles, olives, soy sauce, and miso    · High-fat dairy foods  such as whole or 2% milk, cream cheese, or sour cream, and cheeses     · High-fat protein foods  such as high-fat cuts of beef (T-bone steaks, ribs), chicken or turkey with skin, and organ meats, such as liver    · Cured or smoked meats , such as hot dogs, ochoa, and sausage    · Unhealthy fats and oils , such as butter, stick margarine, shortening, and cooking oils such as coconut or palm oil    · Food and drinks high in sugar , such as soft drinks (soda), sports drinks, sweetened tea, candy, cake, cookies, pies, and doughnuts  Other diet guidelines to follow:   · Eat more foods containing omega-3 fats  Eat fish high in omega-3 fats at least 2 times a week  · Limit alcohol    Too much alcohol can damage your heart and raise your blood pressure  Women should limit alcohol to 1 drink a day  Men should limit alcohol to 2 drinks a day  A drink of alcohol is 12 ounces of beer, 5 ounces of wine, or 1½ ounces of liquor  · Choose low-sodium foods  High-sodium foods can lead to high blood pressure  Add little or no salt to food you prepare  Use herbs and spices in place of salt  · Eat more fiber  to help lower cholesterol levels  Eat at least 5 servings of fruits and vegetables each day  Eat 3 ounces of whole-grain foods each day  Legumes (beans) are also a good source of fiber  Lifestyle guidelines:   · Do not smoke  Nicotine and other chemicals in cigarettes and cigars can cause lung and heart damage  Ask your healthcare provider for information if you currently smoke and need help to quit  E-cigarettes or smokeless tobacco still contain nicotine  Talk to your healthcare provider before you use these products  · Exercise regularly  to help you maintain a healthy weight and improve your blood pressure and cholesterol levels  Ask your healthcare provider about the best exercise plan for you  Do not start an exercise program without asking your healthcare provider  Follow up with your healthcare provider as directed:  Write down your questions so you remember to ask them during your visits  © 2017 [facility] Information is for End User's use only and may not be sold, redistributed or otherwise used for commercial purposes  All illustrations and images included in CareNotes® are the copyrighted property of [employer] , [employer]  or Ryan Melendez  The above information is an  only  It is not intended as medical advice for individual conditions or treatments  Talk to your doctor, nurse or pharmacist before following any medical regimen to see if it is safe and effective for you  Calorie Counting Diet   WHAT YOU NEED TO KNOW:   What is a calorie counting diet?   It is a meal plan based on counting calories each day to reach a healthy body weight  You will need to eat fewer calories if you are trying to lose weight  Weight loss may decrease your risk for certain health problems or improve your health if you have health problems  Some of these health problems include heart disease, high blood pressure, and diabetes  What foods should I avoid? Your dietitian will tell you if you need to avoid certain foods based on your body weight and health condition  You may need to avoid high-fat foods if you are at risk for or have heart disease  You may need to eat fewer foods from the breads and starches food group if you have diabetes  How many calories are in foods? The following is a list of foods and drinks with the approximate number of calories in each  Check the food label to find the exact number of calories  A dietitian can tell you how many calories you should have from each food group each day    · Carbohydrate:      ¨ ½ of a 3-inch bagel, 1 slice of bread, or ½ of a hamburger bun or hot dog bun (80)    ¨ 1 (8-inch) flour tortilla or ½ cup of cooked rice (100)    ¨ 1 (6-inch) corn tortilla (80)    ¨ 1 (6-inch) pancake or 1 cup of bran flakes cereal (110)    ¨ ½ cup of cooked cereal (80)    ¨ ½ cup of cooked pasta (85)    ¨ 1 ounce of pretzels (100)    ¨ 3 cups of air-popped popcorn without butter or oil (80)    · Dairy:      ¨ 1 cup of skim or 1% milk (90)    ¨ 1 cup of 2% milk (120)    ¨ 1 cup of whole milk (160)    ¨ 1 cup of 2% chocolate milk (220)    ¨ 1 ounce of low-fat cheese with 3 grams of fat per ounce (70)    ¨ 1 ounce of cheddar cheese (114)    ¨ ½ cup of 1% fat cottage cheese (80)    ¨ 1 cup of plain or sugar-free, fat-free yogurt (90)    · Protein foods:      ¨ 3 ounces of fish (not breaded or fried) (95)    ¨ 3 ounces of breaded, fried fish (195)    ¨ ¾ cup of tuna canned in water (105)    ¨ 3 ounces of chicken breast without skin (105)    ¨ 1 fried chicken breast with skin (350)    ¨ ¼ cup of fat free egg substitute (40)    ¨ 1 large egg (75)    ¨ 3 ounces of lean beef or pork (165)    ¨ 3 ounces of fried pork chop or ham (185)    ¨ ½ cup of cooked dried beans, such as kidney, rubi, lentils, or navy (115)    ¨ 3 ounces of bologna or lunch meat (225)    ¨ 2 links of breakfast sausage (140)    · Vegetables:      ¨ ½ cup of sliced mushrooms (10)    ¨ 1 cup of salad greens, such as lettuce, spinach, or daryl (15)    ¨ ½ cup of steamed asparagus (20)    ¨ ½ cup of cooked summer squash, zucchini squash, or green or wax beans (25)    ¨ 1 cup of broccoli or cauliflower florets, or 1 medium tomato (25)    ¨ 1 large raw carrot or ½ cup of cooked carrots (40)    ¨ ? of a medium cucumber or 1 stalk of celery (5)    ¨ 1 small baked potato (160)    ¨ 1 cup of breaded, fried vegetables (230)    · Fruit:      ¨ 1 (6-inch) banana (55)     ¨ ½ of a 4-inch grapefruit (55)    ¨ 15 grapes (60)    ¨ 1 medium orange or apple (70)    ¨ 1 large peach (65)    ¨ 1 cup of fresh pineapple chunks (75)    ¨ 1 cup of melon cubes (50)    ¨ 1¼ cups of whole strawberries (45)    ¨ ½ cup of fruit canned in juice (55)    ¨ ½ cup of fruit canned in heavy syrup (110)    ¨ ?  cup of raisins (130)    ¨ ½ cup of unsweetened fruit juice (60)    ¨ ½ cup of grape, cranberry, or prune juice (90)    · Fat:      ¨ 10 peanuts or 2 teaspoons of peanut butter (55)    ¨ 2 tablespoons of avocado or 1 tablespoon of regular salad dressing (45)    ¨ 2 slices of ochoa (90)    ¨ 1 teaspoon of oil, such as safflower, canola, corn, or olive oil (45)    ¨ 2 teaspoons of low-fat margarine, or 1 tablespoon of low-fat mayonnaise (50)    ¨ 1 teaspoon of regular margarine (40)    ¨ 1 tablespoon of regular mayonnaise (135)    ¨ 1 tablespoon of cream cheese or 2 tablespoons of low-fat cream cheese (45)    ¨ 2 tablespoons of vegetable shortening (215)    · Dessert and sweets:      ¨ 8 animal crackers or 5 vanilla wafers (80)    ¨ 1 frozen fruit juice bar (80)    ¨ ½ cup of ice milk or low-fat frozen yogurt (90)    ¨ ½ cup of sherbet or sorbet (125)    ¨ ½ cup of sugar-free pudding or custard (60)    ¨ ½ cup of ice cream (140)    ¨ ½ cup of pudding or custard (175)    ¨ 1 (2-inch) square chocolate brownie (185)    · Combination foods:      ¨ Bean burrito made with an 8-inch tortilla, without cheese (275)    ¨ Chicken breast sandwich with lettuce and tomato (325)    ¨ 1 cup of chicken noodle soup (60)    ¨ 1 beef taco (175)    ¨ Regular hamburger with lettuce and tomato (310)    ¨ Regular cheeseburger with lettuce and tomato (410)     ¨ ¼ of a 12-inch cheese pizza (280)    ¨ Fried fish sandwich with lettuce and tomato (425)    ¨ Hot dog and bun (275)    ¨ 1½ cups of macaroni and cheese (310)    ¨ Taco salad with a fried tortilla shell (870)    · Low-calorie foods:      ¨ 1 tablespoon of ketchup or 1 tablespoon of fat free sour cream (15)    ¨ 1 teaspoon of mustard (5)    ¨ ¼ cup of salsa (20)    ¨ 1 large dill pickle (15)    ¨ 1 tablespoon of fat free salad dressing (10)    ¨ 2 teaspoons of low-sugar, light jam or jelly, or 1 tablespoon of sugar-free syrup (15)    ¨ 1 sugar-free popsicle (15)    ¨ 1 cup of club soda, seltzer water, or diet soda (0)  CARE AGREEMENT:   You have the right to help plan your care  Discuss treatment options with your caregivers to decide what care you want to receive  You always have the right to refuse treatment  The above information is an  only  It is not intended as medical advice for individual conditions or treatments  Talk to your doctor, nurse or pharmacist before following any medical regimen to see if it is safe and effective for you  © 2017 [address] [employer] Information is for End User's use only and may not be sold, redistributed or otherwise used for commercial purposes   All illustrations and images included in CareNotes® are the copyrighted property of [employer]  or [employer]

## 2019-03-30 ENCOUNTER — HOSPITAL ENCOUNTER (OUTPATIENT)
Dept: MRI IMAGING | Facility: HOSPITAL | Age: 54
Discharge: HOME | End: 2019-03-30
Payer: COMMERCIAL

## 2019-03-30 DIAGNOSIS — M25.511 CHRONIC RIGHT SHOULDER PAIN: ICD-10-CM

## 2019-03-30 DIAGNOSIS — G89.29 CHRONIC RIGHT SHOULDER PAIN: ICD-10-CM

## 2019-03-30 PROCEDURE — 73221 MRI JOINT UPR EXTREM W/O DYE: CPT

## 2019-04-03 ENCOUNTER — TELEPHONE (OUTPATIENT)
Dept: FAMILY MEDICINE CLINIC | Facility: CLINIC | Age: 54
End: 2019-04-03

## 2019-04-12 NOTE — PRE-PROCEDURE INSTRUCTIONS
[facility] Medicine Services  HISTORY AND PHYSICAL    Patient Name: Mikie Meléndez  : 1974  MRN: [id number]  Primary Care Physician: Heather Ibarra MD  Date of admission: 2019      Subjective   Subjective     Chief Complaint:  Left flank pain    HPI:  Mikie Meléndez is a 45 y.o. male with PMH of HTN, PAC/PVCs on Flecainide, PAF solely on ASA, and nephrolithiasis presents with intractable left flank pain due to known left kidney stone. Pt states that he began to notice left flank pain one month ago but that it got worse about one week ago. He went to see his Urologist in [place] who prescribed him pain medications, flomax, and macrobid and did a CT A/P.  He advised him to go to the ED if pain worsened. Pt's pain worsened yesterday am and he came to our ED as his Urologist was out of town.  He was given IVFs and more pain medication and sent home. Pt's pain was not controlled despite opiate pain medication, so he returned last evening.  He is being admitted to our service.     Review of Systems   General- no F/C, no weight loss or gain, no fatigue  HEENT- no blurry vision, no nasal congestion, no hearing loss, no sore throat, no dysphagia, no oral ulcers, no dental caries, no bleeding gums  CVS- no chest pain, no palpitations  Pulm- no SOA, no cough, no orthopnea, no PND  GI- no diarrhea, no constipation, no BRBPR, no nausea, no vomiting  MSK- no joint pain, no swelling, no erythema  - no dysuria, + intermittent hematuria  Neuro- no headaches, no focal motor deficits  Psych- no SI/ HI, no depression/anxiety  Otherwise complete ROS reviewed and is negative except as mentioned in the HPI.    Personal History     Past Medical History:   Diagnosis Date   • Excessive caffeine intake    • Hypertension    • Kidney calculi    • PAC (premature atrial contraction)    • PAF (paroxysmal atrial fibrillation) (CMS/HCC)     CHADS-VASc = 1   • PVC (premature ventricular contraction)   Pre-Surgery Instructions:   Medication Instructions    famotidine (PEPCID) 10 mg tablet Instructed patient per Anesthesia Guidelines   fluticasone (FLONASE) 50 mcg/act nasal spray Instructed patient per Anesthesia Guidelines   HYDROcodone-acetaminophen (NORCO) 5-325 mg per tablet Instructed patient per Anesthesia Guidelines   ketoconazole (NIZORAL) 2 % shampoo Instructed patient per Anesthesia Guidelines   ondansetron (ZOFRAN-ODT) 4 mg disintegrating tablet Instructed patient per Anesthesia Guidelines   rosuvastatin (CRESTOR) 5 mg tablet Instructed patient per Anesthesia Guidelines   tamsulosin (FLOMAX) 0 4 mg Instructed patient per Anesthesia Guidelines  Instructed to take pepcid am of surgery with sip ofw ater per anesthesia  "      Past Surgical History:   Procedure Laterality Date   • APPENDECTOMY     • BACK SURGERY     • CARDIAC CATHETERIZATION  06/2016    \"NORMAL\" PER DR. KIMANI PHIPPS   • CARDIAC ELECTROPHYSIOLOGY PROCEDURE N/A 9/28/2016    Procedure: PVA. Stop Flecainide 5 days prior to the procedure. Stop Metoprolol 3 days prior to the procedure.;  Surgeon: Kai Mitchell DO;  Location: [facility] INVASIVE LOCATION;  Service:    • CARDIOVERSION  06/24/2016   • EXTRACORPOREAL SHOCK WAVE LITHOTRIPSY (ESWL)     • HERNIA REPAIR     • LASIK     • NASAL SEPTUM SURGERY         Family History: family history includes Arrhythmia in his sister; No Known Problems in his father and mother; Other in his other. Otherwise pertinent FHx was reviewed and unremarkable.     Social History:  reports that he has never smoked. He has never used smokeless tobacco. He reports that he does not drink alcohol or use drugs.  Social History     Social History Narrative    Patient drinks 1-2 servings of caffeine per day.       Medications:    Available home medication information reviewed.  Medications Prior to Admission   Medication Sig Dispense Refill Last Dose   • amLODIPine (NORVASC) 10 MG tablet Take 1 tablet by mouth Daily. 30 tablet 6 Taking   • aspirin 81 MG EC tablet Take 81 mg by mouth Daily.   Taking   • fenofibrate (TRICOR) 145 MG tablet Take 145 mg by mouth Daily.  2 Taking   • flecainide (TAMBOCOR) 50 MG tablet TAKE 1 TABLET BY MOUTH TWICE A DAY 60 tablet 11 Taking   • losartan (COZAAR) 50 MG tablet TAKE 1 TABLET BY MOUTH EVERY DAY 30 tablet 5 Taking   • metoprolol tartrate (LOPRESSOR) 50 MG tablet TAKE 1 TABLET BY MOUTH TWICE A DAY 60 tablet 6 Taking   • nitrofurantoin, macrocrystal-monohydrate, (MACROBID) 100 MG capsule Take 100 mg by mouth 2 (Two) Times a Day.      • ondansetron ODT (ZOFRAN-ODT) 4 MG disintegrating tablet Take 1 tablet by mouth 4 (Four) Times a Day As Needed for Nausea. 16 tablet 0    • oxyCODONE-acetaminophen (PERCOCET) 5-325 " MG per tablet Take 1 tablet by mouth Every 8 (Eight) Hours As Needed for Moderate Pain . 12 tablet 0    • tamsulosin (FLOMAX) 0.4 MG capsule 24 hr capsule Take 1 capsule by mouth Every Night.          No Known Allergies    Objective   Objective     Vital Signs:   Temp:  [97.8 °F (36.6 °C)-98.6 °F (37 °C)] 97.8 °F (36.6 °C)  Heart Rate:  [72-75] 72  Resp:  [15-18] 18  BP: (126-137)/() 130/91        Physical Exam   Constitutional: Awake, alert, NAD, obese  Eyes: PERRLA, sclerae anicteric, no conjunctival injection  HENT: NCAT, mucous membranes moist  Neck: Supple, no thyromegaly, no lymphadenopathy, trachea midline  Respiratory: Clear to auscultation bilaterally, nonlabored respirations   Cardiovascular: RRR, no murmurs, rubs, or gallops, palpable pedal pulses bilaterally  Gastrointestinal: Positive bowel sounds, soft, nontender, nondistended  Musculoskeletal: No bilateral ankle edema, no clubbing or cyanosis to extremities  Psychiatric: Appropriate affect, cooperative  Neurologic: Oriented x 3, strength symmetric in all extremities, Cranial Nerves grossly intact to confrontation, speech clear  Skin: No rashes    Results Reviewed:  I have personally reviewed current lab, radiology, and data and agree.    Results from last 7 days   Lab Units 04/12/19  0555   WBC 10*3/mm3 11.76*   HEMOGLOBIN g/dL 13.7   HEMATOCRIT % 40.4   PLATELETS 10*3/mm3 227     Results from last 7 days   Lab Units 04/12/19  0555   SODIUM mmol/L 134*   POTASSIUM mmol/L 4.2   CHLORIDE mmol/L 101   CO2 mmol/L 23.0   BUN mg/dL 16   CREATININE mg/dL 1.27   GLUCOSE mg/dL 124*   CALCIUM mg/dL 8.7   ALT (SGPT) U/L 21   AST (SGOT) U/L 18     Estimated Creatinine Clearance: 92.6 mL/min (by C-G formula based on SCr of 1.27 mg/dL).  Brief Urine Lab Results  (Last result in the past 365 days)      Color   Clarity   Blood   Leuk Est   Nitrite   Protein   CREAT   Urine HCG        04/11/19 0149 Yellow Clear Negative Negative Negative Negative             No  results found for: BNP  Imaging Results (last 24 hours)     ** No results found for the last 24 hours. **        Results for orders placed in visit on 01/09/17   Adult Transthoracic Echo Complete With Contrast    Narrative · Left ventricular function is normal. Estimated EF = 60%.  · Left ventricular wall thickness is consistent with mild concentric   hypertrophy.  · Right ventricular cavity is borderline dilated.  · There is no evidence of pericardial effusion.  · No evidence of pulmonary hypertension is present.  · No significant structural valvular abnormality demonstrated.          Assessment/Plan   Assessment / Plan       Nephrolithiasis    PVC's (premature ventricular contractions)    A-fib (CMS/HCC)    Hyperlipemia    HTN (hypertension)    Intractable pain      Mr. Meléndez is a pleasant 46 yo WM w/ PMH of HTN, PVCs on Flecainide, PAF on ASA and nephrolithiasis who presents with left nephrolithiasis with associated hydronephrosis failing outpatient treatment.    Plan:    Left Nephrolithiasis with left hydronephrosis  --4-5 mm in diameter. Pt failed conservative outpatient treatment. Continue tamsulosin, IVFs, IV morphine, PO pain meds and antiemetics  --consulted Urology, appreciate their input  --NPO for possible intervention    PVCs  --on Flecainide, follows with Cardiology    PAF  --continue beta blocker, continue ASA    HTN  --continue beta blocker, hold ARB for now (had AYAKA when he was in the ED yesterday, likely due to above).        DVT prophylaxis:  mechanical    CODE STATUS:    Code Status and Medical Interventions:   Ordered at: 04/12/19 0856     Level Of Support Discussed With:    Patient     Code Status:    CPR     Medical Interventions (Level of Support Prior to Arrest):    Full       Admission Status:  I believe this patient meets INPATIENT status due to the need for care which can only be reasonably provided in an hospital setting such as possible need for aggressive/expedited ancillary services  and/or consultation services, IV medications, close physician monitoring, and/or procedures.  In such, I feel patient’s risk for adverse outcomes and need for care warrant INPATIENT evaluation and predict the patient’s care encounter to likely last beyond 2 midnights.      Electronically signed by Cydney Juan MD, 04/12/19, 10:51 AM.

## 2019-06-27 ENCOUNTER — ANESTHESIA EVENT (OUTPATIENT)
Dept: GASTROENTEROLOGY | Facility: HOSPITAL | Age: 54
End: 2019-06-27

## 2019-06-27 RX ORDER — CETIRIZINE HYDROCHLORIDE 10 MG/1
10 TABLET ORAL DAILY
COMMUNITY
End: 2019-09-08 | Stop reason: ALTCHOICE

## 2019-06-27 NOTE — PRE-PROCEDURE INSTRUCTIONS
Pre-Surgery Instructions:   Medication Instructions    fluticasone (FLONASE) 50 mcg/act nasal spray Instructed patient per Anesthesia Guidelines

## 2019-06-28 ENCOUNTER — HOSPITAL ENCOUNTER (OUTPATIENT)
Dept: GASTROENTEROLOGY | Facility: HOSPITAL | Age: 54
Setting detail: OUTPATIENT SURGERY
Discharge: HOME | End: 2019-06-28
Attending: COLON & RECTAL SURGERY
Payer: COMMERCIAL

## 2019-06-28 ENCOUNTER — ANESTHESIA (OUTPATIENT)
Dept: GASTROENTEROLOGY | Facility: HOSPITAL | Age: 54
End: 2019-06-28

## 2019-06-28 VITALS
DIASTOLIC BLOOD PRESSURE: 68 MMHG | WEIGHT: 185 LBS | SYSTOLIC BLOOD PRESSURE: 113 MMHG | HEIGHT: 68 IN | BODY MASS INDEX: 28.04 KG/M2 | TEMPERATURE: 97.5 F | RESPIRATION RATE: 18 BRPM | OXYGEN SATURATION: 95 % | HEART RATE: 65 BPM

## 2019-06-28 DIAGNOSIS — Z86.010 HISTORY OF COLONIC POLYPS: ICD-10-CM

## 2019-06-28 DIAGNOSIS — Z12.11 ENCOUNTER FOR SCREENING FOR MALIGNANT NEOPLASM OF COLON: ICD-10-CM

## 2019-06-28 RX ORDER — LIDOCAINE HYDROCHLORIDE 10 MG/ML
INJECTION, SOLUTION INFILTRATION; PERINEURAL AS NEEDED
Status: DISCONTINUED | OUTPATIENT
Start: 2019-06-28 | End: 2019-06-28 | Stop reason: SURG

## 2019-06-28 RX ORDER — PROPOFOL 10 MG/ML
INJECTION, EMULSION INTRAVENOUS AS NEEDED
Status: DISCONTINUED | OUTPATIENT
Start: 2019-06-28 | End: 2019-06-28 | Stop reason: SURG

## 2019-06-28 RX ORDER — SODIUM CHLORIDE 9 MG/ML
75 INJECTION, SOLUTION INTRAVENOUS CONTINUOUS
Status: DISCONTINUED | OUTPATIENT
Start: 2019-06-28 | End: 2019-07-02 | Stop reason: HOSPADM

## 2019-06-28 RX ADMIN — PROPOFOL 100 MG: 10 INJECTION, EMULSION INTRAVENOUS at 09:32

## 2019-06-28 RX ADMIN — SODIUM CHLORIDE 75 ML/HR: 9 INJECTION, SOLUTION INTRAVENOUS at 07:50

## 2019-06-28 RX ADMIN — LIDOCAINE HYDROCHLORIDE 50 MG: 10 INJECTION, SOLUTION INFILTRATION; PERINEURAL at 09:32

## 2019-06-28 NOTE — DISCHARGE INSTRUCTIONS
[facility] [address], [address] [place] [address] [address]  Phone: [phone]    DISCHARGE INSTRUCTIONS:    1   __x Complete Exam - Normal    2   ___ Exam normal, but entire colon not seen  We will discuss this with you  3   ___ Polyp(s) removed by "burning" - NO pathology report will follow    4   ___ Polyp(s) removed by excision  Pathology report will be available in 4-5 days   Someone from our office will call you with results  5   ___ Exam prompted biopsies  Pathology report will be available in 4-5 days   Someone from our office will call you with results  6   ___ Exam demonstrated findings that need treatment  Prescriptions will be   Given to you  Return to our office in ____ weeks  Please call for appt  7   ___ Original office visit or colonoscopy findings necessitate an office visit  Please call to set up a new appointment    8   ___ Medication  __________________________________________        [address]:    - Go straight home and rest today    - No driving or drinking alcohol for 24 hours    - Resume regular diet and medications unless otherwise instructed  Coumadin and Plavix are blood thinners  You can resume these medications on __________      IF YOU ARE HAVING ANY FEVER, BLEEDING OR PERSISTENT PAIN IN THE ABDOMEN, PLEASE CALL OUR OFFICE ANY DAY OR TIME  [phone]

## 2019-06-28 NOTE — NURSING NOTE
No distress  Tolerated diet  IV was removed  Discharge instructions were given  Left via ambulatory with wife

## 2019-09-08 ENCOUNTER — APPOINTMENT (EMERGENCY)
Dept: CT IMAGING | Facility: HOSPITAL | Age: 54
End: 2019-09-08
Payer: COMMERCIAL

## 2019-09-08 ENCOUNTER — HOSPITAL ENCOUNTER (EMERGENCY)
Facility: HOSPITAL | Age: 54
Discharge: HOME | End: 2019-09-08
Attending: EMERGENCY MEDICINE
Payer: COMMERCIAL

## 2019-09-08 VITALS
SYSTOLIC BLOOD PRESSURE: 144 MMHG | DIASTOLIC BLOOD PRESSURE: 65 MMHG | OXYGEN SATURATION: 98 % | HEART RATE: 80 BPM | RESPIRATION RATE: 16 BRPM | TEMPERATURE: 98.2 F

## 2019-09-08 DIAGNOSIS — R10.9 RIGHT FLANK PAIN: Primary | ICD-10-CM

## 2019-09-08 DIAGNOSIS — N20.1 URETERIC CALCULUS: ICD-10-CM

## 2019-09-08 LAB
ALBUMIN SERPL BCP-MCNC: 4.2 G/DL (ref 3.5–5)
ALP SERPL-CCNC: 64 U/L (ref 46–116)
ALT SERPL W P-5'-P-CCNC: 41 U/L (ref 12–78)
ANION GAP SERPL CALCULATED.3IONS-SCNC: 9 MMOL/L (ref 4–13)
AST SERPL W P-5'-P-CCNC: 32 U/L (ref 5–45)
BACTERIA UR QL AUTO: ABNORMAL /HPF
BASOPHILS # BLD MANUAL: 0 THOUSAND/UL (ref 0–0.1)
BASOPHILS NFR MAR MANUAL: 0 % (ref 0–1)
BILIRUB SERPL-MCNC: 0.92 MG/DL (ref 0.2–1)
BILIRUB UR QL STRIP: ABNORMAL
BUN SERPL-MCNC: 23 MG/DL (ref 5–25)
CALCIUM SERPL-MCNC: 9.3 MG/DL (ref 8.3–10.1)
CHLORIDE SERPL-SCNC: 107 MMOL/L (ref 100–108)
CLARITY UR: CLEAR
CO2 SERPL-SCNC: 24 MMOL/L (ref 21–32)
COLOR UR: YELLOW
COLOR, POC: YELLOW
CREAT SERPL-MCNC: 1.28 MG/DL (ref 0.6–1.3)
EOSINOPHIL # BLD MANUAL: 0.12 THOUSAND/UL (ref 0–0.4)
EOSINOPHIL NFR BLD MANUAL: 1 % (ref 0–6)
ERYTHROCYTE [DISTWIDTH] IN BLOOD BY AUTOMATED COUNT: 12 % (ref 11.6–15.1)
GFR SERPL CREATININE-BSD FRML MDRD: 63 ML/MIN/1.73SQ M
GLUCOSE SERPL-MCNC: 130 MG/DL (ref 65–140)
GLUCOSE UR STRIP-MCNC: NEGATIVE MG/DL
HCT VFR BLD AUTO: 47.9 % (ref 36.5–49.3)
HGB BLD-MCNC: 16.4 G/DL (ref 12–17)
HGB UR QL STRIP.AUTO: ABNORMAL
KETONES UR STRIP-MCNC: ABNORMAL MG/DL
LEUKOCYTE ESTERASE UR QL STRIP: NEGATIVE
LIPASE SERPL-CCNC: 96 U/L (ref 73–393)
LYMPHOCYTES # BLD AUTO: 0.86 THOUSAND/UL (ref 0.6–4.47)
LYMPHOCYTES # BLD AUTO: 7 % (ref 14–44)
MCH RBC QN AUTO: 32.9 PG (ref 26.8–34.3)
MCHC RBC AUTO-ENTMCNC: 34.2 G/DL (ref 31.4–37.4)
MCV RBC AUTO: 96 FL (ref 82–98)
MONOCYTES # BLD AUTO: 0.61 THOUSAND/UL (ref 0–1.22)
MONOCYTES NFR BLD: 5 % (ref 4–12)
NEUTROPHILS # BLD MANUAL: 10.66 THOUSAND/UL (ref 1.85–7.62)
NEUTS BAND NFR BLD MANUAL: 2 % (ref 0–8)
NEUTS SEG NFR BLD AUTO: 85 % (ref 43–75)
NITRITE UR QL STRIP: NEGATIVE
NON-SQ EPI CELLS URNS QL MICRO: ABNORMAL /HPF
NRBC BLD AUTO-RTO: 0 /100 WBCS
PH UR STRIP.AUTO: 6 [PH] (ref 4.5–8)
PLATELET # BLD AUTO: 162 THOUSANDS/UL (ref 149–390)
PLATELET BLD QL SMEAR: ADEQUATE
PMV BLD AUTO: 10.8 FL (ref 8.9–12.7)
POTASSIUM SERPL-SCNC: 4.3 MMOL/L (ref 3.5–5.3)
PROT SERPL-MCNC: 7.6 G/DL (ref 6.4–8.2)
PROT UR STRIP-MCNC: ABNORMAL MG/DL
RBC # BLD AUTO: 4.98 MILLION/UL (ref 3.88–5.62)
RBC #/AREA URNS AUTO: ABNORMAL /HPF
RBC MORPH BLD: NORMAL
SODIUM SERPL-SCNC: 140 MMOL/L (ref 136–145)
SP GR UR STRIP.AUTO: >=1.03 (ref 1–1.03)
TOTAL CELLS COUNTED SPEC: 100
UROBILINOGEN UR QL STRIP.AUTO: 0.2 E.U./DL
WBC # BLD AUTO: 12.25 THOUSAND/UL (ref 4.31–10.16)
WBC #/AREA URNS AUTO: ABNORMAL /HPF

## 2019-09-08 PROCEDURE — 74176 CT ABD & PELVIS W/O CONTRAST: CPT

## 2019-09-08 PROCEDURE — 83690 ASSAY OF LIPASE: CPT | Performed by: EMERGENCY MEDICINE

## 2019-09-08 PROCEDURE — 99285 EMERGENCY DEPT VISIT HI MDM: CPT | Performed by: EMERGENCY MEDICINE

## 2019-09-08 PROCEDURE — 96361 HYDRATE IV INFUSION ADD-ON: CPT

## 2019-09-08 PROCEDURE — 85007 BL SMEAR W/DIFF WBC COUNT: CPT | Performed by: EMERGENCY MEDICINE

## 2019-09-08 PROCEDURE — 85027 COMPLETE CBC AUTOMATED: CPT | Performed by: EMERGENCY MEDICINE

## 2019-09-08 PROCEDURE — 96374 THER/PROPH/DIAG INJ IV PUSH: CPT

## 2019-09-08 PROCEDURE — 99284 EMERGENCY DEPT VISIT MOD MDM: CPT

## 2019-09-08 PROCEDURE — 81001 URINALYSIS AUTO W/SCOPE: CPT

## 2019-09-08 PROCEDURE — 36415 COLL VENOUS BLD VENIPUNCTURE: CPT | Performed by: EMERGENCY MEDICINE

## 2019-09-08 PROCEDURE — 80053 COMPREHEN METABOLIC PANEL: CPT | Performed by: EMERGENCY MEDICINE

## 2019-09-08 PROCEDURE — 96375 TX/PRO/DX INJ NEW DRUG ADDON: CPT

## 2019-09-08 RX ORDER — HYDROMORPHONE HCL/PF 1 MG/ML
1 SYRINGE (ML) INJECTION ONCE
Status: COMPLETED | OUTPATIENT
Start: 2019-09-08 | End: 2019-09-08

## 2019-09-08 RX ORDER — NAPROXEN 500 MG/1
500 TABLET ORAL 2 TIMES DAILY WITH MEALS
Qty: 10 TABLET | Refills: 0 | Status: SHIPPED | OUTPATIENT
Start: 2019-09-08 | End: 2019-10-11

## 2019-09-08 RX ORDER — ONDANSETRON 2 MG/ML
4 INJECTION INTRAMUSCULAR; INTRAVENOUS ONCE
Status: COMPLETED | OUTPATIENT
Start: 2019-09-08 | End: 2019-09-08

## 2019-09-08 RX ORDER — OXYCODONE HYDROCHLORIDE AND ACETAMINOPHEN 5; 325 MG/1; MG/1
1 TABLET ORAL EVERY 4 HOURS PRN
COMMUNITY
End: 2019-10-11

## 2019-09-08 RX ORDER — TAMSULOSIN HYDROCHLORIDE 0.4 MG/1
0.4 CAPSULE ORAL
COMMUNITY
End: 2019-09-11 | Stop reason: SDUPTHER

## 2019-09-08 RX ORDER — ONDANSETRON 4 MG/1
4 TABLET, ORALLY DISINTEGRATING ORAL EVERY 8 HOURS PRN
Qty: 10 TABLET | Refills: 0 | Status: SHIPPED | OUTPATIENT
Start: 2019-09-08 | End: 2019-10-11

## 2019-09-08 RX ORDER — OXYCODONE HYDROCHLORIDE AND ACETAMINOPHEN 5; 325 MG/1; MG/1
1 TABLET ORAL EVERY 8 HOURS PRN
Qty: 10 TABLET | Refills: 0 | Status: SHIPPED | OUTPATIENT
Start: 2019-09-08 | End: 2019-09-13

## 2019-09-08 RX ADMIN — ONDANSETRON 4 MG: 2 INJECTION INTRAMUSCULAR; INTRAVENOUS at 15:06

## 2019-09-08 RX ADMIN — HYDROMORPHONE HYDROCHLORIDE 1 MG: 1 INJECTION, SOLUTION INTRAMUSCULAR; INTRAVENOUS; SUBCUTANEOUS at 15:02

## 2019-09-08 RX ADMIN — SODIUM CHLORIDE 1000 ML: 0.9 INJECTION, SOLUTION INTRAVENOUS at 15:02

## 2019-09-08 NOTE — ED PROVIDER NOTES
History  Chief Complaint   Patient presents with    Flank Pain     Reports woke up at 0230 today wtih right flank pain similar to kidney stone pain in the past  Denies fever  Reports nausea/vomiting  Denies blood in urine  History provided by:  Patient   used: No    Flank Pain   Pain location:  R flank  Pain quality: aching    Pain radiates to:  Does not radiate  Pain severity:  Moderate  Onset quality:  Gradual  Duration:  12 hours  Timing:  Intermittent  Progression:  Waxing and waning  Chronicity:  New  Context: not recent illness    Context comment:  Hx of Kidney stones  Relieved by:  Nothing  Worsened by:  Nothing  Ineffective treatments:  None tried  Associated symptoms: vomiting    Associated symptoms: no chest pain, no chills, no cough, no diarrhea, no dysuria, no fever, no nausea, no shortness of breath and no sore throat    Vomiting:     Quality:  Stomach contents    Number of occurrences: Once    Severity:  Mild    Duration:  12 hours    Timing:  Sporadic    Progression:  Improving  Risk factors: has not had multiple surgeries    Risk factors comment:  Hx of left renal colic, Appendectomy      Prior to Admission Medications   Prescriptions Last Dose Informant Patient Reported? Taking?   famotidine (PEPCID) 20 mg tablet   No Yes   Sig: Take 1 tablet (20 mg total) by mouth 2 (two) times a day as needed for heartburn   fluticasone (FLONASE) 50 mcg/act nasal spray   Yes Yes   Si sprays into each nostril daily as needed for rhinitis   oxyCODONE-acetaminophen (PERCOCET) 5-325 mg per tablet 2019 at Unknown time Spouse/Significant Other Yes Yes   Sig: Take 1 tablet by mouth every 4 (four) hours as needed for moderate pain   rosuvastatin (CRESTOR) 5 mg tablet   No Yes   Sig: TAKE 1 TABLET DAILY     tamsulosin (FLOMAX) 0 4 mg 2019 at Unknown time Spouse/Significant Other Yes Yes   Sig: Take 0 4 mg by mouth daily with dinner      Facility-Administered Medications: None Past Medical History:   Diagnosis Date    Calculus, ureter     Colon polyp     Diverticulosis     Enlarged prostate     GERD (gastroesophageal reflux disease)     Hiatal hernia     Hyperlipidemia     Kidney stone     Multiple benign polyps of large intestine     Pain in joint of right shoulder region     Psoriasis     hands and scalp    Rectal polyp     Seasonal allergies     Seborrheic dermatitis of scalp     Wears glasses        Past Surgical History:   Procedure Laterality Date    APPENDECTOMY  1982    COLONOSCOPY  2005    FIBEROPTIC; [id number] - (STASIK)    LASIK      CORNEAL    ND CYSTO/URETERO W/LITHOTRIPSY &INDWELL STENT INSRT Left 1/15/2019    Procedure: CYSTO, URETEROSCOPY W/HOLMIUM LASER, RETROGRADE PYELOGRAM, STENT INSERTION;  Surgeon: Jhon Nathan MD;  Location: [facility];  Service: Urology    SEPTOPLASTY  10/1996    VASECTOMY  2002    SURGERY VAS DEFERENS       Family History   Problem Relation Age of Onset    Other Mother         BACK PAIN    Hypertension Mother     Melanoma Father         MALIGNANT MELANOMA OF THE SKIN    Prostate cancer Father     Osteoporosis Brother      I have reviewed and agree with the history as documented  Social History     Tobacco Use    Smoking status: Former Smoker     Last attempt to quit:      Years since quittin 7    Smokeless tobacco: Former User     Quit date:    Substance Use Topics    Alcohol use: Yes     Comment: 3 x weekly    Drug use: No        Review of Systems   Constitutional: Negative for chills and fever  HENT: Negative for facial swelling, sore throat and trouble swallowing  Eyes: Negative for pain and visual disturbance  Respiratory: Negative for cough, chest tightness and shortness of breath  Cardiovascular: Negative for chest pain and leg swelling  Gastrointestinal: Positive for vomiting  Negative for abdominal pain, blood in stool, diarrhea and nausea     Genitourinary: Positive for flank pain  Negative for dysuria  Musculoskeletal: Negative for back pain, neck pain and neck stiffness  Skin: Negative for pallor and rash  Allergic/Immunologic: Negative for environmental allergies and immunocompromised state  Neurological: Negative for dizziness and headaches  Hematological: Negative for adenopathy  Does not bruise/bleed easily  Psychiatric/Behavioral: Negative for agitation and behavioral problems  All other systems reviewed and are negative  Physical Exam  Physical Exam   Constitutional: He is oriented to person, place, and time  He appears well-developed and well-nourished  No distress  HENT:   Head: Normocephalic and atraumatic  Eyes: EOM are normal    Neck: Normal range of motion  Neck supple  Cardiovascular: Normal rate, regular rhythm, normal heart sounds and intact distal pulses  Pulmonary/Chest: Effort normal and breath sounds normal    Abdominal: Soft  Bowel sounds are normal  There is tenderness (mild right CVA and RLQ)  There is no rebound and no guarding  Musculoskeletal: Normal range of motion  Neurological: He is alert and oriented to person, place, and time  Skin: Skin is warm and dry  Psychiatric: He has a normal mood and affect  Nursing note and vitals reviewed        Vital Signs  ED Triage Vitals [09/08/19 1416]   Temperature Pulse Respirations Blood Pressure SpO2   98 2 °F (36 8 °C) 79 16 155/79 98 %      Temp Source Heart Rate Source Patient Position - Orthostatic VS BP Location FiO2 (%)   Temporal Monitor Sitting Right arm --      Pain Score       7           Vitals:    09/08/19 1416 09/08/19 1617   BP: 155/79 144/65   Pulse: 79 80   Patient Position - Orthostatic VS: Sitting Lying         Visual Acuity      ED Medications  Medications   sodium chloride 0 9 % bolus 1,000 mL (0 mL Intravenous Stopped 9/8/19 1600)   HYDROmorphone (DILAUDID) injection 1 mg (1 mg Intravenous Given 9/8/19 1502)   ondansetron (ZOFRAN) injection 4 mg (4 mg Intravenous Given 9/8/19 1506)       Diagnostic Studies  Results Reviewed     Procedure Component Value Units Date/Time    CBC and differential [[id number]]  (Abnormal) Collected:  09/08/19 1502    Lab Status:  Final result Specimen:  Blood from Arm, Right Updated:  09/08/19 1546     WBC 12 25 Thousand/uL      RBC 4 98 Million/uL      Hemoglobin 16 4 g/dL      Hematocrit 47 9 %      MCV 96 fL      MCH 32 9 pg      MCHC 34 2 g/dL      RDW 12 0 %      MPV 10 8 fL      Platelets 846 Thousands/uL      nRBC 0 /100 WBCs     Narrative: This is an appended report  These results have been appended to a previously verified report      Comprehensive metabolic panel [[id number]] Collected:  09/08/19 1502    Lab Status:  Final result Specimen:  Blood from Arm, Right Updated:  09/08/19 1534     Sodium 140 mmol/L      Potassium 4 3 mmol/L      Chloride 107 mmol/L      CO2 24 mmol/L      ANION GAP 9 mmol/L      BUN 23 mg/dL      Creatinine 1 28 mg/dL      Glucose 130 mg/dL      Calcium 9 3 mg/dL      AST 32 U/L      ALT 41 U/L      Alkaline Phosphatase 64 U/L      Total Protein 7 6 g/dL      Albumin 4 2 g/dL      Total Bilirubin 0 92 mg/dL      eGFR 63 ml/min/1 73sq m     Narrative:       [place] guidelines for Chronic Kidney Disease (CKD):     Stage 1 with normal or high GFR (GFR > 90 mL/min/1 73 square meters)    Stage 2 Mild CKD (GFR = 60-89 mL/min/1 73 square meters)    Stage 3A Moderate CKD (GFR = 45-59 mL/min/1 73 square meters)    Stage 3B Moderate CKD (GFR = 30-44 mL/min/1 73 square meters)    Stage 4 Severe CKD (GFR = 15-29 mL/min/1 73 square meters)    Stage 5 End Stage CKD (GFR <15 mL/min/1 73 square meters)  Note: GFR calculation is accurate only with a steady state creatinine    Lipase [[id number]]  (Normal) Collected:  09/08/19 1502    Lab Status:  Final result Specimen:  Blood from Arm, Right Updated:  09/08/19 1534     Lipase 96 u/L     Urine Microscopic [[id number]] (Abnormal) Collected:  09/08/19 1431    Lab Status:  Final result Specimen:  Urine, Clean Catch Updated:  09/08/19 1513     RBC, UA 4-10 /hpf      WBC, UA None Seen /hpf      Epithelial Cells None Seen /hpf      Bacteria, UA Occasional /hpf     POCT urinalysis dipstick [[id number]]  (Normal) Resulted:  09/08/19 1436    Lab Status:  Final result Specimen:  Urine Updated:  09/08/19 1436     Color, UA yellow    ED Urine Macroscopic [[id number]]  (Abnormal) Collected:  09/08/19 1431    Lab Status:  Final result Specimen:  Urine Updated:  09/08/19 1432     Color, UA Yellow     Clarity, UA Clear     pH, UA 6 0     Leukocytes, UA Negative     Nitrite, UA Negative     Protein,  (2+) mg/dl      Glucose, UA Negative mg/dl      Ketones, UA >=160 (4+) mg/dl      Urobilinogen, UA 0 2 E U /dl      Bilirubin, UA Interference- unable to analyze     Blood, UA Moderate     Specific Gravity, UA >=1 030    Narrative:       CLINITEK RESULT                 CT renal stone study abdomen pelvis without contrast   Final Result by Candace Joseph MD (09/08 1533)      1  Mild right-sided hydroureteronephrosis secondary to a punctate calculus in the bladder within or just beyond the right ureterovesical junction  2   Mild hepatic steatosis  3   Mildly enlarged prostate  The study was marked in [facility] for immediate notification  Workstation performed: [id number]                    Procedures  Procedures       ED Course  ED Course as of Sep 08 2104   Beverlie Olszewski Sep 08, 2019   1603 Mild right hydronephrosis, punctate calculus, right UVJ/bladder  1604 WBC(!): 12 25   1604 Hemoglobin: 16 4   1604 Sodium: 140   1604 Potassium: 4 3   1604 BUN: 23   1604 Creatinine: 1 28   1604 Leukocytes, UA: Negative   1604 Nitrite, UA: Negative   1604 Bacteria, UA: Occasional   1604 Labs, urine show no acute abnormality     WBC, UA: None Seen   1604 Patient appears comfortable, stable for discharge, will give pain meds, nausea meds, advised follow-up with Urology  MDM  Number of Diagnoses or Management Options  Right flank pain: new and requires workup  Ureteric calculus: new and requires workup  Diagnosis management comments: Patient is a 60-year-old male, comes in with right flank pain, started during the night, vomited once, has history of kidney stones, had prior kidney stone on the left side earlier this year, which required intervention cardiac with the patient; denies fever, diarrhea  Previous surgical history of appendectomy  On exam no acute distress:  Vital signs stable, abdomen is soft, mild tenderness in right flank/CVA  Impression:  Right flank pain, history of kidney stones, ureteric colic, UTI, pyelonephritis  Will check labs, get CT scan abdomen pelvis renal protocol stone study, give pain and nausea med, IV fluids  Amount and/or Complexity of Data Reviewed  Clinical lab tests: reviewed and ordered  Tests in the radiology section of CPT®: ordered and reviewed  Tests in the medicine section of CPT®: ordered and reviewed  Independent visualization of images, tracings, or specimens: yes        Disposition  Final diagnoses:   Right flank pain   Ureteric calculus     Time reflects when diagnosis was documented in both MDM as applicable and the Disposition within this note     Time User Action Codes Description Comment    9/8/2019  2:58 PM Ryan Denise Add [R10 9] Right flank pain     9/8/2019  4:05 PM Ryan Denise Add [N20 1] Ureteric calculus       ED Disposition     ED Disposition Condition Date/Time Comment    Discharge Stable Sun Sep 8, 2019  4:04 PM Eliceo Mendiola discharge to home/self care              Follow-up Information     Follow up With Specialties Details Why Contact Info Additional [address] [facility] Schedule an appointment as soon as possible for a visit   [address] [facility] [phone]  [place] [facility] [address], [place], [address]          Discharge Medication List as of 9/8/2019  4:07 PM      START taking these medications    Details   naproxen (NAPROSYN) 500 mg tablet Take 1 tablet (500 mg total) by mouth 2 (two) times a day with meals for 5 days, Starting Sun 9/8/2019, Until Fri 9/13/2019, Print      ondansetron (ZOFRAN-ODT) 4 mg disintegrating tablet Take 1 tablet (4 mg total) by mouth every 8 (eight) hours as needed for nausea or vomiting for up to 5 days, Starting Sun 9/8/2019, Until Fri 9/13/2019, Print      !! oxyCODONE-acetaminophen (PERCOCET) 5-325 mg per tablet Take 1 tablet by mouth every 8 (eight) hours as needed for moderate pain for up to 5 daysMax Daily Amount: 3 tablets, Starting Sun 9/8/2019, Until Fri 9/13/2019, Print       !! - Potential duplicate medications found  Please discuss with provider  CONTINUE these medications which have NOT CHANGED    Details   famotidine (PEPCID) 20 mg tablet Take 1 tablet (20 mg total) by mouth 2 (two) times a day as needed for heartburn, Starting Wed 3/13/2019, Normal      fluticasone (FLONASE) 50 mcg/act nasal spray 2 sprays into each nostril daily as needed for rhinitis, Historical Med      !! oxyCODONE-acetaminophen (PERCOCET) 5-325 mg per tablet Take 1 tablet by mouth every 4 (four) hours as needed for moderate pain, Historical Med      rosuvastatin (CRESTOR) 5 mg tablet TAKE 1 TABLET DAILY , Normal      tamsulosin (FLOMAX) 0 4 mg Take 0 4 mg by mouth daily with dinner, Historical Med       !! - Potential duplicate medications found  Please discuss with provider  No discharge procedures on file      ED Provider  Electronically Signed by           Miguel A Garcia MD  09/08/19 2748

## 2019-09-09 ENCOUNTER — TELEPHONE (OUTPATIENT)
Dept: UROLOGY | Facility: AMBULATORY SURGERY CENTER | Age: 54
End: 2019-09-09

## 2019-09-09 NOTE — TELEPHONE ENCOUNTER
Spoke with the patient; He was seen in the ER for a kidney stone and is in need of a follow-up appointment  Appointment made for 09/111/2019 at 12:45 with Venkatesh Paiz

## 2019-09-09 NOTE — TELEPHONE ENCOUNTER
Patient managed by Dr Judie Bridges needs a follow up appointment for emergency room visit for kidney stone

## 2019-09-11 ENCOUNTER — OFFICE VISIT (OUTPATIENT)
Dept: UROLOGY | Facility: MEDICAL CENTER | Age: 54
End: 2019-09-11
Payer: COMMERCIAL

## 2019-09-11 VITALS
BODY MASS INDEX: 28.64 KG/M2 | DIASTOLIC BLOOD PRESSURE: 88 MMHG | HEART RATE: 62 BPM | SYSTOLIC BLOOD PRESSURE: 132 MMHG | WEIGHT: 189 LBS | HEIGHT: 68 IN

## 2019-09-11 DIAGNOSIS — N20.1 CALCULUS OF URETER: Primary | ICD-10-CM

## 2019-09-11 LAB
SL AMB  POCT GLUCOSE, UA: ABNORMAL
SL AMB LEUKOCYTE ESTERASE,UA: ABNORMAL
SL AMB POCT BILIRUBIN,UA: ABNORMAL
SL AMB POCT BLOOD,UA: ABNORMAL
SL AMB POCT CLARITY,UA: CLEAR
SL AMB POCT COLOR,UA: YELLOW
SL AMB POCT KETONES,UA: ABNORMAL
SL AMB POCT NITRITE,UA: ABNORMAL
SL AMB POCT PH,UA: 7
SL AMB POCT SPECIFIC GRAVITY,UA: 1.02
SL AMB POCT URINE PROTEIN: ABNORMAL
SL AMB POCT UROBILINOGEN: 0.2

## 2019-09-11 PROCEDURE — 99213 OFFICE O/P EST LOW 20 MIN: CPT | Performed by: PHYSICIAN ASSISTANT

## 2019-09-11 PROCEDURE — 81003 URINALYSIS AUTO W/O SCOPE: CPT | Performed by: PHYSICIAN ASSISTANT

## 2019-09-11 RX ORDER — TAMSULOSIN HYDROCHLORIDE 0.4 MG/1
0.4 CAPSULE ORAL
Qty: 30 CAPSULE | Refills: 3 | Status: SHIPPED | OUTPATIENT
Start: 2019-09-11 | End: 2019-10-11

## 2019-09-11 NOTE — PROGRESS NOTES
9/11/2019      Chief Complaint   Patient presents with    Nephrolithiasis     ER follow up, passed stone       Assessment and Plan    47 y o  male managed by Dr Nikhil Jim    1  Mild right-sided hydroureteronephrosis secondary to a punctate calculus in the bladder within or just beyond the right ureterovesical junction  - stone passed, patient brings thing alonh today, will send out for analysis  - [facility] kidney stone education handout provided  - KUB and US in 1 month, if normal again 1 year    2  Prostate cancer screening  - PSA 1 55 (3/6/2019)  - due again 3/2020    KUB and US in 1 month, if normal again 1 year      History of Present Illness  Ru Diaz is a 47 y o  male here for follow up evaluation of a punctate ureteral stone and prostate cancer screening  The patient passed a stone and bring it along to his visit today  Is the patient's 2nd kidney stone  His 1st stone required ureteroscopic intervention 1/15/2019  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  The patient is otherwise doing well  His most recent PSA is 1 55 obtained 3/6/2019          Review of Systems               Past Medical History  Past Medical History:   Diagnosis Date    Calculus, ureter     Colon polyp     Diverticulosis     Enlarged prostate     GERD (gastroesophageal reflux disease)     Hiatal hernia     Hyperlipidemia     Kidney stone 2019    Multiple benign polyps of large intestine     Pain in joint of right shoulder region     Psoriasis     hands and scalp    Rectal polyp     Seasonal allergies     Seborrheic dermatitis of scalp     Wears glasses        Past Social History  Past Surgical History:   Procedure Laterality Date    APPENDECTOMY  01/1982    COLONOSCOPY  03/2005    FIBEROPTIC; [id number] -2012 ([facility])    LASIK      CORNEAL    WA CYSTO/URETERO W/LITHOTRIPSY &INDWELL STENT INSRT Left 1/15/2019    Procedure: CYSTO, URETEROSCOPY W/HOLMIUM LASER, RETROGRADE PYELOGRAM, STENT INSERTION;  Surgeon: Coretta Rivas MD;  Location: [facility];  Service: Urology    SEPTOPLASTY  10/1996    VASECTOMY  2002    SURGERY [facility] DEFERENS     Social History     Tobacco Use   Smoking Status Former Smoker    Last attempt to quit: [address] Years since quittin 7   Smokeless Tobacco Former User    Quit date:        Past Family History  Family History   Problem Relation Age of Onset    Other Mother         BACK PAIN    Hypertension Mother     Melanoma Father         MALIGNANT MELANOMA OF THE SKIN    Prostate cancer Father     Osteoporosis Brother        Past Social history  Social History     Socioeconomic History    Marital status: /Civil Union     Spouse name: Not on file    Number of children: Not on file    Years of education: Not on file    Highest education level: Not on file   Occupational History    Not on file   Social Needs    Financial resource strain: Not on file    Food insecurity:     Worry: Not on file     Inability: Not on file    Transportation needs:     Medical: Not on file     Non-medical: Not on file   Tobacco Use    Smoking status: Former Smoker     Last attempt to quit:      Years since quittin 7    Smokeless tobacco: Former User     Quit date:    Substance and Sexual Activity    Alcohol use: Yes     Comment: 3 x weekly    Drug use: No    Sexual activity: Not on file   Lifestyle    Physical activity:     Days per week: Not on file     Minutes per session: Not on file    Stress: Not on file   Relationships    Social connections:     Talks on phone: Not on file     Gets together: Not on file     Attends Yarsanism service: Not on file     Active member of club or organization: Not on file     Attends meetings of clubs or organizations: Not on file     Relationship status: Not on file    Intimate partner violence:     Fear of current or ex partner: Not on file     Emotionally abused: Not on file     Physically abused: Not on file Forced sexual activity: Not on file   Other Topics Concern    Not on file   Social History Narrative    Not on file       Current Medications  Current Outpatient Medications   Medication Sig Dispense Refill    famotidine (PEPCID) 20 mg tablet Take 1 tablet (20 mg total) by mouth 2 (two) times a day as needed for heartburn 180 tablet 3    fluticasone (FLONASE) 50 mcg/act nasal spray 2 sprays into each nostril daily as needed for rhinitis      rosuvastatin (CRESTOR) 5 mg tablet TAKE 1 TABLET DAILY  180 tablet 0    naproxen (NAPROSYN) 500 mg tablet Take 1 tablet (500 mg total) by mouth 2 (two) times a day with meals for 5 days (Patient not taking: Reported on 9/11/2019) 10 tablet 0    ondansetron (ZOFRAN-ODT) 4 mg disintegrating tablet Take 1 tablet (4 mg total) by mouth every 8 (eight) hours as needed for nausea or vomiting for up to 5 days (Patient not taking: Reported on 9/11/2019) 10 tablet 0    oxyCODONE-acetaminophen (PERCOCET) 5-325 mg per tablet Take 1 tablet by mouth every 4 (four) hours as needed for moderate pain      oxyCODONE-acetaminophen (PERCOCET) 5-325 mg per tablet Take 1 tablet by mouth every 8 (eight) hours as needed for moderate pain for up to 5 daysMax Daily Amount: 3 tablets (Patient not taking: Reported on 9/11/2019) 10 tablet 0    tamsulosin (FLOMAX) 0 4 mg Take 1 capsule (0 4 mg total) by mouth daily with dinner 30 capsule 3     No current facility-administered medications for this visit  Allergies  No Known Allergies      The following portions of the patient's history were reviewed and updated as appropriate: allergies, current medications, past medical history, past social history, past surgical history and problem list       Vitals  Vitals:    09/11/19 1248   BP: 132/88   Pulse: 62   Weight: 85 7 kg (189 lb)   Height: 5' 8" (1 727 m)       Physical Exam  Constitutional   General appearance: Patient is seated and in no acute distress, well appearing and well nourished  Head and Face   Head and face: Normal     Eyes   Conjunctiva and lids: No erythema, swelling or discharge  Ears, Nose, Mouth, and Throat   Hearing: Normal     Pulmonary   Respiratory effort: No increased work of breathing or signs of respiratory distress  Cardiovascular   Examination of extremities for edema and/or varicosities: Normal     Abdomen   Abdomen: Non-tender, no masses  Musculoskeletal   Gait and station: normal   Skin   Skin and subcutaneous tissue: Warm, dry, and intact  No visible lesions or rashes  Psychiatric   Judgment and insight: Normal  Recent and remote memory:  Normal  Mood and affect: Normal      Results  Recent Results (from the past 1 hour(s))   POCT urine dip auto non-scope    Collection Time: 09/11/19 12:52 PM   Result Value Ref Range     COLOR,UA yellow     CLARITY,UA clear     SPECIFIC GRAVITY,UA 1 020      PH,UA 7 0     LEUKOCYTE ESTERASE,UA neg     NITRITE,UA neg     GLUCOSE, UA neg     KETONES,UA neg     BILIRUBIN,UA neg     BLOOD,UA trace-intact     POCT URINE PROTEIN neg     SL AMB POCT UROBILINOGEN 0 2    ]  Lab Results   Component Value Date    PSA 1 4 01/27/2018     Lab Results   Component Value Date    CALCIUM 9 3 09/08/2019     09/10/2016    K 4 3 09/08/2019    CO2 24 09/08/2019     09/08/2019    BUN 23 09/08/2019    CREATININE 1 28 09/08/2019     Lab Results   Component Value Date    WBC 12 25 (H) 09/08/2019    HGB 16 4 09/08/2019    HCT 47 9 09/08/2019    MCV 96 09/08/2019     09/08/2019       Orders  Orders Placed This Encounter   Procedures    XR abdomen 1 view kub     Standing Status:   Future     Standing Expiration Date:   9/11/2023     Scheduling Instructions:      Bring along any outside films relating to this procedure             Order Specific Question:   Reason for Exam:     Answer:   calculi    US kidney and bladder     Standing Status:   Future     Standing Expiration Date:   9/11/2023     Scheduling Instructions:      "Prep required if being scheduled in conjunction with other studies, refer to those examination's Preps first before scheduling  All patients for US Kidney and Bladder they must drink 24 oz of water 60 minutes before your scheduled appointment time  This test requires you to have a FULL bladder  Please do not urinate before your test             Please bring your physician order, insurance cards, a form of photo ID and a list of your medications with you  Arrive 15 minutes prior to your appointment time in order to register  If you need to have lab work or a urinalysis, please do this AFTER your ultrasound  "            To schedule this appointment, please contact Central Scheduling at [phone]       Order Specific Question:   Reason for Exam:     Answer:   calculi    POCT urine dip auto non-scope

## 2019-10-11 ENCOUNTER — OFFICE VISIT (OUTPATIENT)
Dept: FAMILY MEDICINE CLINIC | Facility: CLINIC | Age: 54
End: 2019-10-11
Payer: COMMERCIAL

## 2019-10-11 VITALS
SYSTOLIC BLOOD PRESSURE: 124 MMHG | WEIGHT: 189.4 LBS | BODY MASS INDEX: 27.11 KG/M2 | TEMPERATURE: 97.5 F | HEART RATE: 74 BPM | OXYGEN SATURATION: 94 % | HEIGHT: 70 IN | DIASTOLIC BLOOD PRESSURE: 82 MMHG

## 2019-10-11 DIAGNOSIS — Z12.5 PROSTATE CANCER SCREENING: ICD-10-CM

## 2019-10-11 DIAGNOSIS — E78.2 MIXED HYPERLIPIDEMIA: Primary | ICD-10-CM

## 2019-10-11 DIAGNOSIS — R79.89 LOW TESTOSTERONE: ICD-10-CM

## 2019-10-11 DIAGNOSIS — Z23 ENCOUNTER FOR IMMUNIZATION: ICD-10-CM

## 2019-10-11 PROBLEM — L71.9 ROSACEA: Status: ACTIVE | Noted: 2019-10-11

## 2019-10-11 PROBLEM — C44.319 BASAL CELL CARCINOMA OF SKIN OF OTHER PARTS OF FACE: Status: ACTIVE | Noted: 2019-10-11

## 2019-10-11 PROBLEM — S43.429A SPRAIN OF ROTATOR CUFF CAPSULE: Status: ACTIVE | Noted: 2019-04-25

## 2019-10-11 PROBLEM — D48.5 NEOPLASM OF UNCERTAIN BEHAVIOR OF SKIN: Status: ACTIVE | Noted: 2019-10-11

## 2019-10-11 PROBLEM — M25.519 SHOULDER JOINT PAIN: Status: ACTIVE | Noted: 2019-04-25

## 2019-10-11 LAB
ALBUMIN SERPL-MCNC: 4.4 G/DL (ref 3.6–5.1)
ALBUMIN/GLOB SERPL: 2 (CALC) (ref 1–2.5)
ALP SERPL-CCNC: 53 U/L (ref 40–115)
ALT SERPL-CCNC: 22 U/L (ref 9–46)
AST SERPL-CCNC: 15 U/L (ref 10–35)
BILIRUB SERPL-MCNC: 0.8 MG/DL (ref 0.2–1.2)
BUN SERPL-MCNC: 18 MG/DL (ref 7–25)
BUN/CREAT SERPL: NORMAL (CALC) (ref 6–22)
CALCIUM SERPL-MCNC: 9.3 MG/DL (ref 8.6–10.3)
CHLORIDE SERPL-SCNC: 104 MMOL/L (ref 98–110)
CHOLEST SERPL-MCNC: 200 MG/DL
CHOLEST/HDLC SERPL: 3.8 (CALC)
CO2 SERPL-SCNC: 26 MMOL/L (ref 20–32)
CREAT SERPL-MCNC: 0.91 MG/DL (ref 0.7–1.33)
GLOBULIN SER CALC-MCNC: 2.2 G/DL (CALC) (ref 1.9–3.7)
GLUCOSE SERPL-MCNC: 88 MG/DL (ref 65–99)
HDLC SERPL-MCNC: 53 MG/DL
LDLC SERPL CALC-MCNC: 115 MG/DL (CALC)
NONHDLC SERPL-MCNC: 147 MG/DL (CALC)
POTASSIUM SERPL-SCNC: 4 MMOL/L (ref 3.5–5.3)
PROT SERPL-MCNC: 6.6 G/DL (ref 6.1–8.1)
SL AMB EGFR AFRICAN AMERICAN: 110 ML/MIN/1.73M2
SL AMB EGFR NON AFRICAN AMERICAN: 95 ML/MIN/1.73M2
SODIUM SERPL-SCNC: 138 MMOL/L (ref 135–146)
TESTOST FREE SERPL-MCNC: 64.7 PG/ML (ref 35–155)
TESTOST SERPL-MCNC: 246 NG/DL (ref 250–1100)
TRIGL SERPL-MCNC: 204 MG/DL
TSH SERPL-ACNC: 0.75 MIU/L (ref 0.4–4.5)

## 2019-10-11 PROCEDURE — 3008F BODY MASS INDEX DOCD: CPT | Performed by: FAMILY MEDICINE

## 2019-10-11 PROCEDURE — 90471 IMMUNIZATION ADMIN: CPT | Performed by: FAMILY MEDICINE

## 2019-10-11 PROCEDURE — 90682 RIV4 VACC RECOMBINANT DNA IM: CPT | Performed by: FAMILY MEDICINE

## 2019-10-11 PROCEDURE — 99214 OFFICE O/P EST MOD 30 MIN: CPT | Performed by: FAMILY MEDICINE

## 2019-10-19 NOTE — PROGRESS NOTES
Assessment/Plan:  Patient's cholesterol profile continues to prove over the last 3 years  LDL just above 130 at this time  Continue rosuvastatin at 5 mg  Refer to local dietitian for further risk factor modification  Patient's tox I will start is slightly low  Discussed all the information and options about treating this if necessary  At this point declines treatment but of changes his mind would refer to Endocrinology for further evaluation  Flu shot given today  Reflux stable  Recheck 6 months with repeat labs including PS A  Diagnoses and all orders for this visit:    Mixed hyperlipidemia  -     Comprehensive metabolic panel; Future  -     Lipid panel; Future    Low testosterone    Prostate cancer screening  -     PSA, Total Screen; Future    Encounter for immunization  -     influenza vaccine, [id number], quadrivalent, recombinant, PF, 0 5 mL, for patients 18 yr+ (FLUBLOK)        1  Mixed hyperlipidemia  Comprehensive metabolic panel    Lipid panel   2  Low testosterone     3  Prostate cancer screening  PSA, Total Screen   4  Encounter for immunization  influenza vaccine, [id number], quadrivalent, recombinant, PF, 0 5 mL, for patients 18 yr+ (FLUBLOK)       Subjective:        Patient ID: Yary Bernard is a 47 y o  male  Chief Complaint   Patient presents with    Follow-up     7 months; discuss BW    Immunizations       Patient here with wife to review recent labs  No new changes  The following portions of the patient's history were reviewed and updated as appropriate: past medical history, past surgical history and problem list       Review of Systems   Constitutional: Negative for appetite change, fatigue, fever and unexpected weight change  HENT: Negative for congestion, ear pain, postnasal drip, rhinorrhea, sinus pressure, sinus pain and sore throat  Eyes: Negative for redness and visual disturbance  Respiratory: Negative for chest tightness and shortness of breath  Cardiovascular: Negative for chest pain, palpitations and leg swelling  Gastrointestinal: Negative for abdominal distention, abdominal pain, diarrhea and nausea  Endocrine: Negative for cold intolerance and heat intolerance  Genitourinary: Negative for dysuria and hematuria  Musculoskeletal: Negative for arthralgias, gait problem and myalgias  Skin: Negative for pallor and rash  Neurological: Negative for dizziness and headaches  Psychiatric/Behavioral: Negative for behavioral problems  The patient is not nervous/anxious  Objective:  /82 (BP Location: Left arm, Patient Position: Sitting, Cuff Size: Standard)   Pulse 74   Temp 97 5 °F (36 4 °C)   Ht 5' 10" (1 778 m)   Wt 85 9 kg (189 lb 6 4 oz)   SpO2 94%   BMI 27 18 kg/m²        Physical Exam   Constitutional: He is oriented to person, place, and time  He appears well-nourished  No distress  HENT:   Head: Normocephalic and atraumatic  Right Ear: Tympanic membrane normal    Left Ear: Tympanic membrane normal    Mouth/Throat: Oropharynx is clear and moist    Eyes: Pupils are equal, round, and reactive to light  Conjunctivae and EOM are normal  No scleral icterus  Neck: Normal range of motion  Neck supple  No thyromegaly present  Cardiovascular: Normal rate, regular rhythm, normal heart sounds and intact distal pulses  No murmur heard  Pulses:       Carotid pulses are 0 on the right side, and 0 on the left side  Pulmonary/Chest: Effort normal and breath sounds normal  No respiratory distress  He has no wheezes  Abdominal: Soft  He exhibits no distension  Musculoskeletal: He exhibits no edema  Lymphadenopathy:     He has no cervical adenopathy  Neurological: He is alert and oriented to person, place, and time  He has normal reflexes  No cranial nerve deficit  Skin: Skin is warm  No pallor  Psychiatric: He has a normal mood and affect   His behavior is normal  Judgment and thought content normal    Nursing note and vitals reviewed

## 2019-11-04 ENCOUNTER — TELEPHONE (OUTPATIENT)
Dept: UROLOGY | Facility: CLINIC | Age: 54
End: 2019-11-04

## 2019-11-04 ENCOUNTER — APPOINTMENT (OUTPATIENT)
Dept: RADIOLOGY | Facility: MEDICAL CENTER | Age: 54
End: 2019-11-04
Payer: COMMERCIAL

## 2019-11-04 ENCOUNTER — HOSPITAL ENCOUNTER (OUTPATIENT)
Dept: ULTRASOUND IMAGING | Facility: MEDICAL CENTER | Age: 54
Discharge: HOME | End: 2019-11-04
Payer: COMMERCIAL

## 2019-11-04 DIAGNOSIS — N20.1 CALCULUS OF URETER: ICD-10-CM

## 2019-11-04 DIAGNOSIS — N20.1 CALCULUS OF URETER: Primary | ICD-10-CM

## 2019-11-04 PROCEDURE — 74018 RADEX ABDOMEN 1 VIEW: CPT

## 2019-11-04 PROCEDURE — 76770 US EXAM ABDO BACK WALL COMP: CPT

## 2019-11-20 ENCOUNTER — EVALUATION (OUTPATIENT)
Dept: PHYSICAL THERAPY | Facility: MEDICAL CENTER | Age: 54
End: 2019-11-20
Payer: COMMERCIAL

## 2019-11-20 ENCOUNTER — TRANSCRIBE ORDERS (OUTPATIENT)
Dept: PHYSICAL THERAPY | Facility: MEDICAL CENTER | Age: 54
End: 2019-11-20

## 2019-11-20 DIAGNOSIS — Z98.890 S/P ARTHROSCOPY OF RIGHT SHOULDER: Primary | ICD-10-CM

## 2019-11-20 PROCEDURE — 97161 PT EVAL LOW COMPLEX 20 MIN: CPT | Performed by: PHYSICAL THERAPIST

## 2019-11-20 NOTE — PROGRESS NOTES
PT Evaluation     Today's date: 2019  Patient name: Fatimah Markham  : 1965  MRN: [id number]  Referring provider: Gregorio Gamble MD  Dx:   Encounter Diagnosis     ICD-10-CM    1  S/P arthroscopy of right shoulder Z98 890        Start Time: 0800  Stop Time: 08  Total time in clinic (min): 35 minutes    Assessment  Assessment details: Fatimah Markham is a 47 y o  male was evaluated on 2019  for S/P arthroscopy of right shoulder  (primary encounter diagnosis)  Fatimah Markham has the above listed impairments resulting in functional deficits and negative impact to quality of life  Patient is appropriate for skilled PT intervention to promote maximal return to function and patient specific goals  Patient agrees with outlined treatment plan and all questions were answered to their satisfaction  Impairments: abnormal muscle firing, abnormal muscle tone, abnormal or restricted ROM, impaired physical strength, lacks appropriate home exercise program and pain with function  Understanding of Dx/Px/POC: good   Prognosis: good    Goals  Patient will successfully transition to home exercise program   Patient will be able to manage symptoms independently      Patient will regain full active motion  Patient will sleep without pain  Patient will perform all house tasks without limitaiton     Plan  Patient would benefit from: skilled PT  Referral necessary: No  Planned modality interventions: thermotherapy: hydrocollator packs  Planned therapy interventions: home exercise program, manual therapy, neuromuscular re-education, patient education, functional ROM exercises, strengthening, stretching, joint mobilization, graded activity, graded exercise, therapeutic exercise, body mechanics training, motor coordination training and activity modification  Frequency: 2x week  Duration in weeks: 12  Treatment plan discussed with: patient        Subjective Evaluation    History of Present Illness  Mechanism of injury: Lelo Aguirre is a 47 y o  male presenting to therapy s/p R shoulder arthroscopy  He had pain for many years and attempted PT with no long term improvement  Underwent RCT debridement, acromioplasty and distal clavicle excision on   Currently in no pain given continued nerve block  He is hoping to get back to normal household activity and housework as well as normal sleep     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   Pain  Current pain ratin  At best pain ratin  At worst pain ratin          Objective     Cervical/Thoracic Screen   Cervical range of motion within normal limits    Passive Range of Motion   Left Shoulder   External rotation 0°: 60 degrees   Internal rotation 0°: 65 degrees     Right Shoulder   Flexion: 150 degrees   Abduction: 150 degrees   External rotation 0°: 45 degrees   Internal rotation 0°: 65 degrees     Additional Passive Range of Motion Details  Further testing limited given nerve block  '  Dressing removed, steristrips in place              Precautions: Progress as tolerated, sling PRN       Manual                                                                                   Exercise Diary                                                                                                                                                                                                                                                                                      Modalities

## 2019-11-20 NOTE — LETTER
2019    Erica Elizondo MD  [facility]    Patient: Zoila Bloom   YOB: 1965   Date of Visit: 2019     Encounter Diagnosis     ICD-10-CM    1  S/P arthroscopy of right shoulder Z98 890        Dear Dr Mayte Encarnacion: Thank you for your recent referral of Zoila Bloom  Please review the attached evaluation summary from Neo's recent visit  Please verify that you agree with the plan of care by signing the attached order  If you have any questions or concerns, please do not hesitate to call  I sincerely appreciate the opportunity to share in the care of one of your patients and hope to have another opportunity to work with you in the near future  Sincerely,    Chata Ingram, PT      Referring Provider:      I certify that I have read the below Plan of Care and certify the need for these services furnished under this plan of treatment while under my care  Erica Elizondo MD  [address]: [phone]          PT Evaluation     Today's date: 2019  Patient name: Zoila Bloom  : 1965  MRN: [id number]  Referring provider: Sheng Pavon MD  Dx:   Encounter Diagnosis     ICD-10-CM    1  S/P arthroscopy of right shoulder Z98 890        Start Time: 0800  Stop Time: 08  Total time in clinic (min): 35 minutes    Assessment  Assessment details: Zoila Bloom is a 47 y o  male was evaluated on 2019  for S/P arthroscopy of right shoulder  (primary encounter diagnosis)  Zoila Bloom has the above listed impairments resulting in functional deficits and negative impact to quality of life  Patient is appropriate for skilled PT intervention to promote maximal return to function and patient specific goals  Patient agrees with outlined treatment plan and all questions were answered to their satisfaction        Impairments: abnormal muscle firing, abnormal muscle tone, abnormal or restricted ROM, impaired physical strength, lacks appropriate home exercise program and pain with function  Understanding of Dx/Px/POC: good   Prognosis: good    Goals  Patient will successfully transition to home exercise program   Patient will be able to manage symptoms independently  Patient will regain full active motion  Patient will sleep without pain  Patient will perform all house tasks without limitaiton     Plan  Patient would benefit from: skilled PT  Referral necessary: No  Planned modality interventions: thermotherapy: hydrocollator packs  Planned therapy interventions: home exercise program, manual therapy, neuromuscular re-education, patient education, functional ROM exercises, strengthening, stretching, joint mobilization, graded activity, graded exercise, therapeutic exercise, body mechanics training, motor coordination training and activity modification  Frequency: 2x week  Duration in weeks: 12  Treatment plan discussed with: patient        Subjective Evaluation    History of Present Illness  Mechanism of injury: Dwight Sultana is a 47 y o  male presenting to therapy s/p R shoulder arthroscopy  He had pain for many years and attempted PT with no long term improvement  Underwent RCT debridement, acromioplasty and distal clavicle excision on   Currently in no pain given continued nerve block  He is hoping to get back to normal household activity and housework as well as normal sleep     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   Pain  Current pain ratin  At best pain ratin  At worst pain ratin          Objective     Cervical/Thoracic Screen   Cervical range of motion within normal limits    Passive Range of Motion   Left Shoulder   External rotation 0°:  60 degrees   Internal rotation 0°:  65 degrees     Right Shoulder   Flexion: 150 degrees   Abduction: 150 degrees   External rotation 0°:  45 degrees   Internal rotation 0°:  65 degrees Additional Passive Range of Motion Details  Further testing limited given nerve block  '  Dressing removed, steristrips in place              Precautions: Progress as tolerated, sling PRN       Manual                                                                                   Exercise Diary                                                                                                                                                                                                                                                                                      Modalities

## 2019-11-22 ENCOUNTER — OFFICE VISIT (OUTPATIENT)
Dept: PHYSICAL THERAPY | Facility: MEDICAL CENTER | Age: 54
End: 2019-11-22
Payer: COMMERCIAL

## 2019-11-22 DIAGNOSIS — Z98.890 S/P ARTHROSCOPY OF RIGHT SHOULDER: Primary | ICD-10-CM

## 2019-11-22 PROCEDURE — 97140 MANUAL THERAPY 1/> REGIONS: CPT | Performed by: PHYSICAL THERAPIST

## 2019-11-22 PROCEDURE — 97110 THERAPEUTIC EXERCISES: CPT | Performed by: PHYSICAL THERAPIST

## 2019-11-25 NOTE — PROGRESS NOTES
Daily Note     Today's date: 2019  Patient name: Ramsey Zamudio  : 1965  MRN: [id number]  Referring provider: Kane Marley DO  Dx:   Encounter Diagnosis     ICD-10-CM    1  S/P arthroscopy of right shoulder Z98 890                   Subjective: Mary Velazquez reports that he is doing well, pain very minimal, out of sling       Objective: See treatment diary below      Assessment: Tolerated treatment well  Patient is doing great and having minimal pain  Active motion near full with minimal compensation  Instructed patient on shoulder stability program, no issues  Follow up as needee      Plan: Continue per plan of care        Precautions: Progress as tolerated, sling PRN       Manual              PROM AF                                                                    Exercise Diary              TB rows and extension Green  3x10            ER walkouts Green  3 reps             Prone scap retraction 30                                                                                                                                                                                                                                             Modalities

## 2019-11-26 ENCOUNTER — TELEPHONE (OUTPATIENT)
Dept: FAMILY MEDICINE CLINIC | Facility: CLINIC | Age: 54
End: 2019-11-26

## 2019-11-26 NOTE — TELEPHONE ENCOUNTER
Yes me or Winston  Openings are tight over the next couple weeks    That is assuming that this is not huge

## 2019-12-09 ENCOUNTER — PROCEDURE VISIT (OUTPATIENT)
Dept: FAMILY MEDICINE CLINIC | Facility: CLINIC | Age: 54
End: 2019-12-09
Payer: COMMERCIAL

## 2019-12-09 VITALS
BODY MASS INDEX: 29.1 KG/M2 | TEMPERATURE: 97.9 F | WEIGHT: 192 LBS | OXYGEN SATURATION: 97 % | HEART RATE: 70 BPM | DIASTOLIC BLOOD PRESSURE: 82 MMHG | SYSTOLIC BLOOD PRESSURE: 132 MMHG | HEIGHT: 68 IN

## 2019-12-09 DIAGNOSIS — R79.89 LOW TESTOSTERONE: ICD-10-CM

## 2019-12-09 DIAGNOSIS — L91.8 SKIN TAG: ICD-10-CM

## 2019-12-09 DIAGNOSIS — L98.9 ENCOUNTER FOR REMOVAL OF SKIN LESION: Primary | ICD-10-CM

## 2019-12-09 DIAGNOSIS — Z85.828 HISTORY OF SKIN CANCER: ICD-10-CM

## 2019-12-09 PROCEDURE — 11200 RMVL SKIN TAGS UP TO&INC 15: CPT | Performed by: NURSE PRACTITIONER

## 2019-12-09 PROCEDURE — 99213 OFFICE O/P EST LOW 20 MIN: CPT | Performed by: NURSE PRACTITIONER

## 2019-12-09 PROCEDURE — 88304 TISSUE EXAM BY PATHOLOGIST: CPT | Performed by: PATHOLOGY

## 2019-12-09 RX ORDER — TAMSULOSIN HYDROCHLORIDE 0.4 MG/1
CAPSULE ORAL
COMMUNITY
End: 2020-03-02 | Stop reason: SDUPTHER

## 2019-12-09 NOTE — PROGRESS NOTES
Assessment/Plan:    Skin Lesion Removal/ hx of skin cancer   Lesion removed, visual assessment consistent with skin tag, given history will send out for pathology  Will call with results  See procedure note  Low testosterone   Reviewed testing with patient  Will reach out to PCP Dr Delia Austin to discuss why testing was done and if any further intervention is needed  Patient verbalizes understand and agrees with treatment plan  Diagnoses and all orders for this visit:    Encounter for removal of skin lesion    Skin tag  -     Skin tag removal  -     Tissue Exam; Future  -     Tissue Exam    History of skin cancer    Low testosterone    Other orders  -     tamsulosin (FLOMAX) 0 4 mg; tamsulosin 0 4 mg capsule                Subjective:        Patient ID: Rob Grace is a 47 y o  male  Chief Complaint   Patient presents with    Skin Problem     R side of neck; not sure how long it has been there but it does irritate him, believes it did grow in size   pt would also like labs reviewed       Here to review lab work and for skin tag removal on base of right neck  States it's been irritating him, the collar of his shirt has been rubbing against it a lot and he has noticed over the past few months it has enlarged  He does have history of skin cancer on face  The following portions of the patient's history were reviewed and updated as appropriate: allergies, current medications, past family history, past social history and problem list     Review of Systems   Constitutional: Negative for chills and fever  Eyes: Negative for discharge  Respiratory: Negative for shortness of breath  Cardiovascular: Negative for chest pain  Gastrointestinal: Negative for constipation and diarrhea  Genitourinary: Negative for difficulty urinating  Musculoskeletal: Negative for joint swelling  Skin: Negative for rash  Neurological: Negative for headaches  Hematological: Negative for adenopathy  Psychiatric/Behavioral: The patient is not nervous/anxious  Objective:  /82 (BP Location: Left arm, Patient Position: Sitting, Cuff Size: Standard)   Pulse 70   Temp 97 9 °F (36 6 °C) (Temporal)   Ht 5' 8" (1 727 m)   Wt 87 1 kg (192 lb)   SpO2 97%   BMI 29 19 kg/m²      Physical Exam   Constitutional: He is oriented to person, place, and time  He appears well-developed  No distress  overweight   HENT:   Head: Normocephalic and atraumatic  Right Ear: External ear normal    Left Ear: External ear normal    Eyes: Conjunctivae and lids are normal  Right eye exhibits no discharge  Left eye exhibits no discharge  Neck: Neck supple  Cardiovascular: Normal rate and regular rhythm  No murmur heard  Pulmonary/Chest: Effort normal and breath sounds normal  No respiratory distress  He has no wheezes  Musculoskeletal: He exhibits no deformity  Neurological: He is alert and oriented to person, place, and time  Skin: Skin is warm and dry  He is not diaphoretic  Psychiatric: He has a normal mood and affect  His speech is normal and behavior is normal  Judgment and thought content normal  Cognition and memory are normal    Nursing note and vitals reviewed  Skin tag removal  Date/Time: 12/9/2019 10:01 AM  Performed by: GAURAV Gray  Authorized by: GAURAV Gray     Procedure Details - Skin Tag Destruction:     Up to 15      [facility]  [address] [facility]  Neck    Skin lesion 1 size (mm): see photo  Malignancy: malignancy unknown      Destruction method: scissors used for extraction    Lesion 6:      Small about of lidocaine used at stalk of lesion, removed with scissors, scant bleeding, small amount of aluminum chloride applied  Dressed with neosporin and band aid  After care instructions provided                Current Outpatient Medications:     famotidine (PEPCID) 20 mg tablet, Take 1 tablet (20 mg total) by mouth 2 (two) times a day as needed for heartburn, Disp: 180 tablet, Rfl: 3    fluticasone (FLONASE) 50 mcg/act nasal spray, 2 sprays into each nostril daily as needed for rhinitis, Disp: , Rfl:     rosuvastatin (CRESTOR) 5 mg tablet, TAKE 1 TABLET DAILY  , Disp: 180 tablet, Rfl: 0    tamsulosin (FLOMAX) 0 4 mg, tamsulosin 0 4 mg capsule, Disp: , Rfl:   No Known Allergies

## 2019-12-09 NOTE — PATIENT INSTRUCTIONS
Keep site clean and dry  We will call with pathology results  Please call the office if you are experiencing any worsening of symptoms or no symptom improvement

## 2020-02-25 DIAGNOSIS — E78.00 PURE HYPERCHOLESTEROLEMIA: ICD-10-CM

## 2020-02-25 RX ORDER — ROSUVASTATIN CALCIUM 5 MG/1
TABLET, COATED ORAL
Qty: 90 TABLET | Refills: 1 | Status: SHIPPED | OUTPATIENT
Start: 2020-02-25 | End: 2020-09-23

## 2020-02-28 DIAGNOSIS — N40.1 BPH WITH URINARY OBSTRUCTION: Primary | ICD-10-CM

## 2020-02-28 DIAGNOSIS — N13.8 BPH WITH URINARY OBSTRUCTION: Primary | ICD-10-CM

## 2020-02-28 NOTE — TELEPHONE ENCOUNTER
Patient left a message on the Medication Refill voice mail line requesting a new prescription for Tamsulosin 0 4mg, 30 day supply to [facility]/pharmacy in [employer] [facility]

## 2020-03-02 RX ORDER — TAMSULOSIN HYDROCHLORIDE 0.4 MG/1
0.4 CAPSULE ORAL
Qty: 30 CAPSULE | Refills: 2 | Status: SHIPPED | OUTPATIENT
Start: 2020-03-02 | End: 2020-03-27

## 2020-03-02 NOTE — TELEPHONE ENCOUNTER
This medication therapy was stated back in January, 2019  He has had several office visit, his most recent being 9/11/19, but there has been no mention of continuing the Tamsulosin since that time  Up to this point, all his visit have been kidney stone focused not yearly maintaince  Script for requested medication was queued, however, the patient should be seen in the next 90 days just to make sure his maintaince medications are being monitored accordingly    Script was queued and forwarded to the Advanced Practitioner covering the [facility] location for approval

## 2020-03-27 DIAGNOSIS — N40.1 BPH WITH URINARY OBSTRUCTION: ICD-10-CM

## 2020-03-27 DIAGNOSIS — N13.8 BPH WITH URINARY OBSTRUCTION: ICD-10-CM

## 2020-03-27 RX ORDER — TAMSULOSIN HYDROCHLORIDE 0.4 MG/1
0.4 CAPSULE ORAL
Qty: 90 CAPSULE | Refills: 3 | Status: SHIPPED | OUTPATIENT
Start: 2020-03-27

## 2020-03-27 RX ORDER — TAMSULOSIN HYDROCHLORIDE 0.4 MG/1
CAPSULE ORAL
Qty: 90 CAPSULE | Refills: 1 | Status: SHIPPED | OUTPATIENT
Start: 2020-03-27 | End: 2020-03-27 | Stop reason: SDUPTHER

## 2020-05-18 DIAGNOSIS — K21.00 ESOPHAGITIS, REFLUX: ICD-10-CM

## 2020-05-18 RX ORDER — FAMOTIDINE 20 MG/1
TABLET, FILM COATED ORAL
Qty: 180 TABLET | Refills: 3 | Status: SHIPPED | OUTPATIENT
Start: 2020-05-18 | End: 2021-06-23

## 2020-06-13 LAB
ALBUMIN SERPL-MCNC: 4.6 G/DL (ref 3.6–5.1)
ALBUMIN/GLOB SERPL: 1.8 (CALC) (ref 1–2.5)
ALP SERPL-CCNC: 60 U/L (ref 35–144)
ALT SERPL-CCNC: 32 U/L (ref 9–46)
AST SERPL-CCNC: 18 U/L (ref 10–35)
BILIRUB SERPL-MCNC: 0.6 MG/DL (ref 0.2–1.2)
BUN SERPL-MCNC: 16 MG/DL (ref 7–25)
BUN/CREAT SERPL: NORMAL (CALC) (ref 6–22)
CALCIUM SERPL-MCNC: 9.4 MG/DL (ref 8.6–10.3)
CHLORIDE SERPL-SCNC: 106 MMOL/L (ref 98–110)
CHOLEST SERPL-MCNC: 196 MG/DL
CHOLEST/HDLC SERPL: 4.1 (CALC)
CO2 SERPL-SCNC: 26 MMOL/L (ref 20–32)
CREAT SERPL-MCNC: 1.01 MG/DL (ref 0.7–1.33)
GLOBULIN SER CALC-MCNC: 2.5 G/DL (CALC) (ref 1.9–3.7)
GLUCOSE SERPL-MCNC: 93 MG/DL (ref 65–99)
HDLC SERPL-MCNC: 48 MG/DL
LDLC SERPL CALC-MCNC: 120 MG/DL (CALC)
NONHDLC SERPL-MCNC: 148 MG/DL (CALC)
POTASSIUM SERPL-SCNC: 4.5 MMOL/L (ref 3.5–5.3)
PROT SERPL-MCNC: 7.1 G/DL (ref 6.1–8.1)
PSA SERPL-MCNC: 1.4 NG/ML
SL AMB EGFR AFRICAN AMERICAN: 97 ML/MIN/1.73M2
SL AMB EGFR NON AFRICAN AMERICAN: 83 ML/MIN/1.73M2
SODIUM SERPL-SCNC: 140 MMOL/L (ref 135–146)
TRIGL SERPL-MCNC: 163 MG/DL

## 2020-06-15 ENCOUNTER — TELEPHONE (OUTPATIENT)
Dept: FAMILY MEDICINE CLINIC | Facility: CLINIC | Age: 55
End: 2020-06-15

## 2020-06-15 ENCOUNTER — TELEMEDICINE (OUTPATIENT)
Dept: FAMILY MEDICINE CLINIC | Facility: CLINIC | Age: 55
End: 2020-06-15
Payer: COMMERCIAL

## 2020-06-15 DIAGNOSIS — E78.2 MIXED HYPERLIPIDEMIA: Primary | ICD-10-CM

## 2020-06-15 DIAGNOSIS — R53.82 CHRONIC FATIGUE: ICD-10-CM

## 2020-06-15 DIAGNOSIS — J30.2 SEASONAL ALLERGIC RHINITIS, UNSPECIFIED TRIGGER: ICD-10-CM

## 2020-06-15 PROCEDURE — 99215 OFFICE O/P EST HI 40 MIN: CPT | Performed by: FAMILY MEDICINE

## 2020-06-15 RX ORDER — LORATADINE 10 MG/1
10 TABLET ORAL DAILY
Qty: 90 TABLET | Refills: 3 | Status: SHIPPED | OUTPATIENT
Start: 2020-06-15 | End: 2021-06-23

## 2020-06-23 DIAGNOSIS — J30.9 ALLERGIC RHINITIS, UNSPECIFIED SEASONALITY, UNSPECIFIED TRIGGER: Primary | ICD-10-CM

## 2020-06-23 RX ORDER — FLUTICASONE PROPIONATE 50 MCG
2 SPRAY, SUSPENSION (ML) NASAL DAILY PRN
Qty: 16 G | Refills: 3 | Status: SHIPPED | OUTPATIENT
Start: 2020-06-23 | End: 2020-11-18

## 2020-06-30 ENCOUNTER — TELEPHONE (OUTPATIENT)
Dept: FAMILY MEDICINE CLINIC | Facility: CLINIC | Age: 55
End: 2020-06-30

## 2020-09-22 DIAGNOSIS — E78.00 PURE HYPERCHOLESTEROLEMIA: ICD-10-CM

## 2020-09-23 RX ORDER — ROSUVASTATIN CALCIUM 5 MG/1
TABLET, COATED ORAL
Qty: 90 TABLET | Refills: 1 | Status: SHIPPED | OUTPATIENT
Start: 2020-09-23 | End: 2021-02-17

## 2020-10-14 ENCOUNTER — IMMUNIZATIONS (OUTPATIENT)
Dept: FAMILY MEDICINE CLINIC | Facility: CLINIC | Age: 55
End: 2020-10-14
Payer: COMMERCIAL

## 2020-10-14 DIAGNOSIS — Z23 ENCOUNTER FOR IMMUNIZATION: ICD-10-CM

## 2020-10-14 DIAGNOSIS — Z23 NEED FOR IMMUNIZATION AGAINST INFLUENZA: Primary | ICD-10-CM

## 2020-10-14 PROCEDURE — 90682 RIV4 VACC RECOMBINANT DNA IM: CPT

## 2020-10-14 PROCEDURE — 90471 IMMUNIZATION ADMIN: CPT

## 2020-11-18 DIAGNOSIS — J30.9 ALLERGIC RHINITIS, UNSPECIFIED SEASONALITY, UNSPECIFIED TRIGGER: ICD-10-CM

## 2020-11-18 RX ORDER — FLUTICASONE PROPIONATE 50 MCG
SPRAY, SUSPENSION (ML) NASAL
Qty: 16 ML | Refills: 3 | Status: SHIPPED | OUTPATIENT
Start: 2020-11-18 | End: 2021-12-13

## 2020-12-15 ENCOUNTER — OFFICE VISIT (OUTPATIENT)
Dept: FAMILY MEDICINE CLINIC | Facility: CLINIC | Age: 55
End: 2020-12-15
Payer: COMMERCIAL

## 2020-12-15 VITALS
DIASTOLIC BLOOD PRESSURE: 82 MMHG | TEMPERATURE: 96.5 F | SYSTOLIC BLOOD PRESSURE: 122 MMHG | HEART RATE: 77 BPM | OXYGEN SATURATION: 98 % | BODY MASS INDEX: 27.43 KG/M2 | WEIGHT: 191.6 LBS | HEIGHT: 70 IN

## 2020-12-15 DIAGNOSIS — N13.8 BPH WITH URINARY OBSTRUCTION: ICD-10-CM

## 2020-12-15 DIAGNOSIS — K21.00 GASTROESOPHAGEAL REFLUX DISEASE WITH ESOPHAGITIS WITHOUT HEMORRHAGE: ICD-10-CM

## 2020-12-15 DIAGNOSIS — Z12.5 PROSTATE CANCER SCREENING: ICD-10-CM

## 2020-12-15 DIAGNOSIS — N40.1 BPH WITH URINARY OBSTRUCTION: ICD-10-CM

## 2020-12-15 DIAGNOSIS — E78.00 PURE HYPERCHOLESTEROLEMIA: ICD-10-CM

## 2020-12-15 DIAGNOSIS — Z00.00 HEALTHCARE MAINTENANCE: Primary | ICD-10-CM

## 2020-12-15 LAB
ALBUMIN SERPL-MCNC: 4.5 G/DL (ref 3.6–5.1)
ALBUMIN/GLOB SERPL: 2 (CALC) (ref 1–2.5)
ALP SERPL-CCNC: 54 U/L (ref 35–144)
ALT SERPL-CCNC: 25 U/L (ref 9–46)
AST SERPL-CCNC: 16 U/L (ref 10–35)
BILIRUB SERPL-MCNC: 0.5 MG/DL (ref 0.2–1.2)
BUN SERPL-MCNC: 15 MG/DL (ref 7–25)
BUN/CREAT SERPL: NORMAL (CALC) (ref 6–22)
CALCIUM SERPL-MCNC: 9.3 MG/DL (ref 8.6–10.3)
CHLORIDE SERPL-SCNC: 108 MMOL/L (ref 98–110)
CHOLEST SERPL-MCNC: 168 MG/DL
CHOLEST/HDLC SERPL: 3.4 (CALC)
CO2 SERPL-SCNC: 25 MMOL/L (ref 20–32)
CREAT SERPL-MCNC: 0.99 MG/DL (ref 0.7–1.33)
GLOBULIN SER CALC-MCNC: 2.3 G/DL (CALC) (ref 1.9–3.7)
GLUCOSE SERPL-MCNC: 99 MG/DL (ref 65–99)
HDLC SERPL-MCNC: 50 MG/DL
LDLC SERPL CALC-MCNC: 93 MG/DL (CALC)
NONHDLC SERPL-MCNC: 118 MG/DL (CALC)
POTASSIUM SERPL-SCNC: 4.4 MMOL/L (ref 3.5–5.3)
PROT SERPL-MCNC: 6.8 G/DL (ref 6.1–8.1)
SL AMB EGFR AFRICAN AMERICAN: 99 ML/MIN/1.73M2
SL AMB EGFR NON AFRICAN AMERICAN: 85 ML/MIN/1.73M2
SODIUM SERPL-SCNC: 141 MMOL/L (ref 135–146)
TESTOST FREE SERPL-MCNC: 81.2 PG/ML (ref 35–155)
TESTOST SERPL-MCNC: 381 NG/DL (ref 250–1100)
TRIGL SERPL-MCNC: 150 MG/DL

## 2020-12-15 PROCEDURE — 1036F TOBACCO NON-USER: CPT | Performed by: FAMILY MEDICINE

## 2020-12-15 PROCEDURE — 99396 PREV VISIT EST AGE 40-64: CPT | Performed by: FAMILY MEDICINE

## 2020-12-15 RX ORDER — TRIAMCINOLONE ACETONIDE 1 MG/G
CREAM TOPICAL
COMMUNITY
Start: 2020-09-16

## 2021-01-27 ENCOUNTER — TELEMEDICINE (OUTPATIENT)
Dept: FAMILY MEDICINE CLINIC | Facility: CLINIC | Age: 56
End: 2021-01-27
Payer: COMMERCIAL

## 2021-01-27 DIAGNOSIS — R19.7 DIARRHEA, UNSPECIFIED TYPE: ICD-10-CM

## 2021-01-27 DIAGNOSIS — Z20.822 ENCOUNTER FOR LABORATORY TESTING FOR COVID-19 VIRUS: Primary | ICD-10-CM

## 2021-01-27 DIAGNOSIS — Z20.822 ENCOUNTER FOR LABORATORY TESTING FOR COVID-19 VIRUS: ICD-10-CM

## 2021-01-27 DIAGNOSIS — R05.9 COUGH: ICD-10-CM

## 2021-01-27 PROCEDURE — 99213 OFFICE O/P EST LOW 20 MIN: CPT | Performed by: FAMILY MEDICINE

## 2021-01-27 PROCEDURE — 87635 SARS-COV-2 COVID-19 AMP PRB: CPT | Performed by: FAMILY MEDICINE

## 2021-01-27 NOTE — PROGRESS NOTES
COVID-19 Virtual Visit     Assessment/Plan:    Problem List Items Addressed This Visit     None      Visit Diagnoses     Encounter for laboratory testing for COVID-19 virus    -  Primary    Relevant Orders    Novel Coronavirus (Covid-19),PCR [facility] - Collected at [facility]    Cough        Relevant Orders    Novel Coronavirus (Covid-19),PCR [facility] - Collected at [facility]    Diarrhea, unspecified type        Relevant Orders    Novel Coronavirus (Covid-19),PCR [facility] - Collected at [facility] or [facility]         Disposition:     I referred patient to one of our centralized sites for a COVID-19 swab  I have spent 15 minutes directly with the patient  Greater than 50% of this time was spent in counseling/coordination of care regarding: instructions for management and impressions  Encounter provider Ne Segura DO    Provider located at [address] [place] [id number]    Recent Visits  No visits were found meeting these conditions  Showing recent visits within past 7 days and meeting all other requirements     Today's Visits  Date Type Provider Dept   01/27/21 Telemedicine Ne Segura DO [facility] today's visits and meeting all other requirements     Future Appointments  No visits were found meeting these conditions  Showing future appointments within next 150 days and meeting all other requirements      This virtual check-in was done via [employer] and patient was informed that this is a secure, HIPAA-compliant platform  He agrees to proceed  Patient agrees to participate in a virtual check in via telephone or video visit instead of presenting to the office to address urgent/immediate medical needs  Patient is aware this is a billable service  After connecting through [facility], the patient was identified by name and date of birth   Rob Grace was informed that this was a telemedicine visit and that the exam was being conducted confidentially over secure lines  My office door was closed  No one else was in the room  Kirill King acknowledged consent and understanding of privacy and security of the telemedicine visit  I informed the patient that I have reviewed his record in Epic and presented the opportunity for him to ask any questions regarding the visit today  The patient agreed to participate  Subjective:   Kirill King is a 54 y o  male who is concerned about COVID-19  Patient's symptoms include cough and diarrhea (yesterday, none today)  Patient denies fever and shortness of breath         Date of symptom onset: 1/26/2021    Exposure:   Contact with a person who is under investigation (PUI) for or who is positive for COVID-19 within the last 14 days?: Yes    Hospitalized recently for fever and/or lower respiratory symptoms?: No      Currently a healthcare worker that is involved in direct patient care?: No      Works in a special setting where the risk of COVID-19 transmission may be high? (this may include long-term care, correctional and [facility] facilities; homeless shelters; assisted-living facilities and group homes ): No      Resident in a special setting where the risk of COVID-19 transmission may be high? (this may include long-term care, correctional and [facility] facilities; homeless shelters; assisted-living facilities and group homes ): No      No results found for: [address] [facility] [address] [address]  Past Medical History:   Diagnosis Date    Calculus, ureter     Colon polyp     Diverticulosis     Enlarged prostate     GERD (gastroesophageal reflux disease)     Hiatal hernia     Hyperlipidemia     Kidney stone 2019    Multiple benign polyps of large intestine     Pain in joint of right shoulder region     Psoriasis     hands and scalp    Rectal polyp     Seasonal allergies     Seborrheic dermatitis of scalp     Wears glasses      Past Surgical History:   Procedure Laterality Date    APPENDECTOMY  01/1982    COLONOSCOPY  03/2005    FIBEROPTIC; [id number] -2012 ([address] [place] [address]) 2016 - 2019(Stalin) - 2024(Stalin)    LASIK      CORNEAL    CT CYSTO/URETERO W/LITHOTRIPSY &INDWELL STENT INSRT Left 1/15/2019    Procedure: CYSTO, URETEROSCOPY W/HOLMIUM LASER, RETROGRADE PYELOGRAM, STENT INSERTION;  Surgeon: Nazanin Jean-Baptiste MD;  [facility];  Service: Urology    SEPTOPLASTY  10/1996    VASECTOMY  11/2002    SURGERY VAS DEFERENS     Current Outpatient Medications   Medication Sig Dispense Refill    famotidine (PEPCID) 20 mg tablet TAKE 1 TABLET BY MOUTH TWICE A DAY AS NEEDED FOR HEARTBURN 180 tablet 3    fluticasone (FLONASE) 50 mcg/act nasal spray INSTILL 2 SPRAYS INTO EACH NOSTRIL DAILY AS NEEDED FOR RHINITIS 16 mL 3    loratadine (CLARITIN) 10 mg tablet Take 1 tablet (10 mg total) by mouth daily 90 tablet 3    mupirocin (BACTROBAN) 2 % ointment APPLY A SMALL AMOUNT TO THE OPENED SCRATCHED AREAS ON THE HANDS TWICE DAILY UNTIL WELL HEALED   rosuvastatin (CRESTOR) 5 mg tablet TAKE 1 TABLET BY MOUTH EVERY DAY 90 tablet 1    tamsulosin (FLOMAX) 0 4 mg Take 1 capsule (0 4 mg total) by mouth daily with dinner 90 capsule 3    triamcinolone (KENALOG) 0 1 % cream APPLY TO HAND ECZEMA TWICE DAILY FOR TWO WEEKS ONLY AS NEEDED FOR FLARING RASH  No current facility-administered medications for this visit  No Known Allergies    Review of Systems   Constitutional: Negative  Negative for fever  HENT: Negative  Eyes: Negative  Respiratory: Positive for cough  Negative for shortness of breath  Cardiovascular: Negative  Gastrointestinal: Positive for diarrhea (yesterday, none today)  Endocrine: Negative  Genitourinary: Negative  Musculoskeletal: Negative  Skin: Negative  Allergic/Immunologic: Negative  Neurological: Negative  Hematological: Negative  Psychiatric/Behavioral: Negative  Objective:     There were no vitals filed for this visit  Physical Exam  Constitutional:       Appearance: Normal appearance  HENT:      Head: Normocephalic and atraumatic  Eyes:      Conjunctiva/sclera: Conjunctivae normal    Pulmonary:      Effort: Pulmonary effort is normal  No respiratory distress  Skin:     Coloration: Skin is not pale  Neurological:      General: No focal deficit present  Mental Status: He is alert and oriented to person, place, and time  Psychiatric:         Mood and Affect: Mood normal          Behavior: Behavior normal          Thought Content: Thought content normal          Judgment: Judgment normal        Patient Instructions   DOX: [phone]  pt has a cough, no direct exopsure, pt had diarrhea on sunday none since, pt has  mild congestin ,phlgme with cough, pt's daughter had confirmed exposure nad she   tested neg  Rec rest and fluids and testing for covid 19  May use pepto bismol prn diarrhea and dimetapp DM cold and cough prn cough, self isolate until results received  Call if fever or sob/resp distress  May take Vitamin D and c and zinc        VIRTUAL VISIT DISCLAIMER    Christiane Chowdhury acknowledges that he has consented to an online visit or consultation  He understands that the online visit is based solely on information provided by him, and that, in the absence of a face-to-face physical evaluation by the physician, the diagnosis he receives is both limited and provisional in terms of accuracy and completeness  This is not intended to replace a full medical face-to-face evaluation by the physician  Christiane Chowdhury understands and accepts these terms

## 2021-01-27 NOTE — PATIENT INSTRUCTIONS
DOX: [phone]  pt has a cough, no direct exopsure, pt had diarrhea on sunday none since, pt has  mild congestin ,phlgme with cough, pt's daughter had confirmed exposure nad she   tested neg  Rec rest and fluids and testing for covid 19  May use pepto bismol prn diarrhea and dimetapp DM cold and cough prn cough, self isolate until results received  Call if fever or sob/resp distress   May take Vitamin D and c and zinc

## 2021-01-28 LAB — SARS-COV-2 RNA RESP QL NAA+PROBE: NEGATIVE

## 2021-02-17 DIAGNOSIS — E78.00 PURE HYPERCHOLESTEROLEMIA: ICD-10-CM

## 2021-02-17 RX ORDER — ROSUVASTATIN CALCIUM 5 MG/1
TABLET, COATED ORAL
Qty: 90 TABLET | Refills: 1 | Status: SHIPPED | OUTPATIENT
Start: 2021-02-17 | End: 2021-08-24

## 2021-03-10 DIAGNOSIS — Z23 ENCOUNTER FOR IMMUNIZATION: ICD-10-CM

## 2021-06-21 LAB
ALBUMIN SERPL-MCNC: 4.6 G/DL (ref 3.6–5.1)
ALBUMIN/GLOB SERPL: 2.1 (CALC) (ref 1–2.5)
ALP SERPL-CCNC: 62 U/L (ref 35–144)
ALT SERPL-CCNC: 29 U/L (ref 9–46)
AST SERPL-CCNC: 20 U/L (ref 10–35)
BILIRUB SERPL-MCNC: 0.5 MG/DL (ref 0.2–1.2)
BUN SERPL-MCNC: 26 MG/DL (ref 7–25)
BUN/CREAT SERPL: 27 (CALC) (ref 6–22)
CALCIUM SERPL-MCNC: 9.2 MG/DL (ref 8.6–10.3)
CHLORIDE SERPL-SCNC: 106 MMOL/L (ref 98–110)
CHOLEST SERPL-MCNC: 176 MG/DL
CHOLEST/HDLC SERPL: 3.7 (CALC)
CO2 SERPL-SCNC: 23 MMOL/L (ref 20–32)
CREAT SERPL-MCNC: 0.96 MG/DL (ref 0.7–1.33)
GLOBULIN SER CALC-MCNC: 2.2 G/DL (CALC) (ref 1.9–3.7)
GLUCOSE SERPL-MCNC: 100 MG/DL (ref 65–99)
HDLC SERPL-MCNC: 47 MG/DL
LDLC SERPL CALC-MCNC: 99 MG/DL (CALC)
NONHDLC SERPL-MCNC: 129 MG/DL (CALC)
POTASSIUM SERPL-SCNC: 4.3 MMOL/L (ref 3.5–5.3)
PROT SERPL-MCNC: 6.8 G/DL (ref 6.1–8.1)
PSA SERPL-MCNC: 1.8 NG/ML
SL AMB EGFR AFRICAN AMERICAN: 102 ML/MIN/1.73M2
SL AMB EGFR NON AFRICAN AMERICAN: 88 ML/MIN/1.73M2
SODIUM SERPL-SCNC: 139 MMOL/L (ref 135–146)
TRIGL SERPL-MCNC: 198 MG/DL

## 2021-06-23 DIAGNOSIS — J30.2 SEASONAL ALLERGIC RHINITIS, UNSPECIFIED TRIGGER: ICD-10-CM

## 2021-06-23 DIAGNOSIS — K21.00 ESOPHAGITIS, REFLUX: ICD-10-CM

## 2021-06-23 RX ORDER — FAMOTIDINE 20 MG/1
TABLET, FILM COATED ORAL
Qty: 180 TABLET | Refills: 3 | Status: SHIPPED | OUTPATIENT
Start: 2021-06-23 | End: 2022-07-29

## 2021-06-23 RX ORDER — LORATADINE 10 MG/1
TABLET ORAL
Qty: 90 TABLET | Refills: 3 | Status: SHIPPED | OUTPATIENT
Start: 2021-06-23 | End: 2021-06-28

## 2021-06-28 ENCOUNTER — OFFICE VISIT (OUTPATIENT)
Dept: FAMILY MEDICINE CLINIC | Facility: CLINIC | Age: 56
End: 2021-06-28
Payer: COMMERCIAL

## 2021-06-28 VITALS
RESPIRATION RATE: 16 BRPM | TEMPERATURE: 97.6 F | HEART RATE: 67 BPM | DIASTOLIC BLOOD PRESSURE: 90 MMHG | BODY MASS INDEX: 26.97 KG/M2 | OXYGEN SATURATION: 97 % | WEIGHT: 188.4 LBS | HEIGHT: 70 IN | SYSTOLIC BLOOD PRESSURE: 120 MMHG

## 2021-06-28 DIAGNOSIS — J30.2 SEASONAL ALLERGIC RHINITIS, UNSPECIFIED TRIGGER: ICD-10-CM

## 2021-06-28 DIAGNOSIS — E78.2 MIXED HYPERLIPIDEMIA: ICD-10-CM

## 2021-06-28 DIAGNOSIS — E78.00 PURE HYPERCHOLESTEROLEMIA: Primary | ICD-10-CM

## 2021-06-28 DIAGNOSIS — R73.01 ELEVATED FASTING BLOOD SUGAR: ICD-10-CM

## 2021-06-28 DIAGNOSIS — Z12.11 COLON CANCER SCREENING: ICD-10-CM

## 2021-06-28 PROCEDURE — 1036F TOBACCO NON-USER: CPT | Performed by: FAMILY MEDICINE

## 2021-06-28 PROCEDURE — 3008F BODY MASS INDEX DOCD: CPT | Performed by: FAMILY MEDICINE

## 2021-06-28 PROCEDURE — 99214 OFFICE O/P EST MOD 30 MIN: CPT | Performed by: FAMILY MEDICINE

## 2021-06-28 RX ORDER — LEVOCETIRIZINE DIHYDROCHLORIDE 5 MG/1
5 TABLET, FILM COATED ORAL EVERY EVENING
Qty: 90 TABLET | Refills: 3 | Status: SHIPPED | OUTPATIENT
Start: 2021-06-28 | End: 2022-07-01

## 2021-07-05 NOTE — PROGRESS NOTES
BMI Counseling: Body mass index is 27 03 kg/m²  The BMI is above normal  Nutrition recommendations include decreasing portion sizes, encouraging healthy choices of fruits and vegetables, decreasing fast food intake, consuming healthier snacks, limiting drinks that contain sugar, moderation in carbohydrate intake, increasing intake of lean protein, reducing intake of saturated and trans fat and reducing intake of cholesterol  Exercise recommendations include exercising 3-5 times per week

## 2021-07-05 NOTE — PROGRESS NOTES
Assessment/Plan:     blood pressure borderline  Patient to do risk factor modification check blood pressures and report back in 2-3 weeks  Recheck otherwise 6 months with repeat labs to include hemoglobin A1c for slightly elevated blood sugar  PSA has not changed in 3 years no more than by 0 4  Insure kit given today  Refills  Given for his antihistamine  COVID vaccine   discussed  Recommend flu shot in the fall  Diagnoses and all orders for this visit:    Pure hypercholesterolemia    Mixed hyperlipidemia  -     Lipid panel; Future  -     Comprehensive metabolic panel; Future  -     TSH, 3rd generation; Future    Elevated fasting blood sugar  -     Hemoglobin A1C; Future    Colon cancer screening  -     Occult Blood, Fecal Immunochemical; Future    Seasonal allergic rhinitis, unspecified trigger  -     levocetirizine (XYZAL) 5 MG tablet; Take 1 tablet (5 mg total) by mouth every evening        1  Pure hypercholesterolemia     2  Mixed hyperlipidemia  Lipid panel    Comprehensive metabolic panel    TSH, 3rd generation   3  Elevated fasting blood sugar  Hemoglobin A1C   4  Colon cancer screening  Occult Blood, Fecal Immunochemical   5  Seasonal allergic rhinitis, unspecified trigger  levocetirizine (XYZAL) 5 MG tablet       Subjective:         Patient ID: Juani Aguirre is a 64 y o  male  Chief Complaint   Patient presents with    Follow-up     6 month follow up          Patient here for 6 month follow-up regarding labs  Overall feels well  No complaint  No Covid vac  The following portions of the patient's history were reviewed and updated as appropriate: past medical history, past surgical history and problem list       Review of Systems   Constitutional: Negative for appetite change, fatigue, fever and unexpected weight change  HENT: Negative for congestion, ear pain, postnasal drip, rhinorrhea, sinus pressure, sinus pain and sore throat  Eyes: Negative for redness and visual disturbance  Respiratory: Negative for chest tightness and shortness of breath  Cardiovascular: Negative for chest pain, palpitations and leg swelling  Gastrointestinal: Negative for abdominal distention, abdominal pain, diarrhea and nausea  Endocrine: Negative for cold intolerance and heat intolerance  Genitourinary: Negative for dysuria and hematuria  Musculoskeletal: Negative for arthralgias, gait problem and myalgias  Skin: Negative for pallor and rash  Neurological: Negative for dizziness and headaches  Psychiatric/Behavioral: Negative for behavioral problems  The patient is not nervous/anxious  Objective:  /90 (BP Location: Left arm, Patient Position: Sitting, Cuff Size: Adult)   Pulse 67   Temp 97 6 °F (36 4 °C) (Temporal)   Resp 16   Ht 5' 10" (1 778 m)   Wt 85 5 kg (188 lb 6 4 oz)   SpO2 97%   BMI 27 03 kg/m²        Physical Exam  Vitals and nursing note reviewed  Constitutional:       General: He is not in acute distress  Appearance: He is not diaphoretic  HENT:      Head: Normocephalic and atraumatic  Eyes:      General: No scleral icterus  Conjunctiva/sclera: Conjunctivae normal       Pupils: Pupils are equal, round, and reactive to light  Neck:      Thyroid: No thyromegaly  Cardiovascular:      Rate and Rhythm: Normal rate and regular rhythm  Pulses:           Carotid pulses are 0 on the right side and 0 on the left side  Heart sounds: Normal heart sounds  No murmur heard  Pulmonary:      Effort: Pulmonary effort is normal  No respiratory distress  Breath sounds: Normal breath sounds  No wheezing  Abdominal:      General: There is no distension  Musculoskeletal:      Cervical back: Neck supple  Right lower leg: No edema  Left lower leg: No edema  Lymphadenopathy:      Cervical: No cervical adenopathy  Skin:     General: Skin is warm  Coloration: Skin is not pale  Neurological:      General: No focal deficit present  Mental Status: He is alert and oriented to person, place, and time  Cranial Nerves: No cranial nerve deficit  Deep Tendon Reflexes: Reflexes are normal and symmetric  Psychiatric:         Mood and Affect: Mood normal          Behavior: Behavior normal          Thought Content:  Thought content normal          Judgment: Judgment normal

## 2021-09-01 ENCOUNTER — TELEPHONE (OUTPATIENT)
Dept: FAMILY MEDICINE CLINIC | Facility: CLINIC | Age: 56
End: 2021-09-01

## 2021-09-01 NOTE — TELEPHONE ENCOUNTER
Barrington's spouse called the office she checked with their insurance the shingles vaccine is covered  Which one is pt to get so I may schedule? Also need to make sure we have it in stock here    Please call Bhupinder Garcia to schedule at [phone]

## 2021-09-03 ENCOUNTER — CLINICAL SUPPORT (OUTPATIENT)
Dept: FAMILY MEDICINE CLINIC | Facility: CLINIC | Age: 56
End: 2021-09-03
Payer: COMMERCIAL

## 2021-09-03 VITALS — TEMPERATURE: 96.7 F

## 2021-09-03 DIAGNOSIS — Z23 ENCOUNTER FOR IMMUNIZATION: Primary | ICD-10-CM

## 2021-09-03 PROCEDURE — 90750 HZV VACC RECOMBINANT IM: CPT

## 2021-09-03 PROCEDURE — 90471 IMMUNIZATION ADMIN: CPT

## 2021-10-20 ENCOUNTER — IMMUNIZATIONS (OUTPATIENT)
Dept: FAMILY MEDICINE CLINIC | Facility: CLINIC | Age: 56
End: 2021-10-20
Payer: COMMERCIAL

## 2021-10-20 DIAGNOSIS — Z23 ENCOUNTER FOR IMMUNIZATION: Primary | ICD-10-CM

## 2021-10-20 PROCEDURE — 90682 RIV4 VACC RECOMBINANT DNA IM: CPT | Performed by: FAMILY MEDICINE

## 2021-10-20 PROCEDURE — 90471 IMMUNIZATION ADMIN: CPT | Performed by: FAMILY MEDICINE

## 2021-11-24 DIAGNOSIS — E78.00 PURE HYPERCHOLESTEROLEMIA: ICD-10-CM

## 2021-11-24 RX ORDER — ROSUVASTATIN CALCIUM 5 MG/1
TABLET, COATED ORAL
Qty: 90 TABLET | Refills: 0 | Status: SHIPPED | OUTPATIENT
Start: 2021-11-24 | End: 2022-02-22

## 2021-12-12 DIAGNOSIS — J30.9 ALLERGIC RHINITIS, UNSPECIFIED SEASONALITY, UNSPECIFIED TRIGGER: ICD-10-CM

## 2021-12-13 RX ORDER — FLUTICASONE PROPIONATE 50 MCG
SPRAY, SUSPENSION (ML) NASAL
Qty: 48 ML | Refills: 1 | Status: SHIPPED | OUTPATIENT
Start: 2021-12-13 | End: 2022-07-29 | Stop reason: SDUPTHER

## 2021-12-21 LAB
ALBUMIN SERPL-MCNC: 4.4 G/DL (ref 3.6–5.1)
ALBUMIN/GLOB SERPL: 1.8 (CALC) (ref 1–2.5)
ALP SERPL-CCNC: 56 U/L (ref 35–144)
ALT SERPL-CCNC: 37 U/L (ref 9–46)
AST SERPL-CCNC: 22 U/L (ref 10–35)
BILIRUB SERPL-MCNC: 0.5 MG/DL (ref 0.2–1.2)
BUN SERPL-MCNC: 23 MG/DL (ref 7–25)
BUN/CREAT SERPL: ABNORMAL (CALC) (ref 6–22)
CALCIUM SERPL-MCNC: 9.1 MG/DL (ref 8.6–10.3)
CHLORIDE SERPL-SCNC: 106 MMOL/L (ref 98–110)
CHOLEST SERPL-MCNC: 188 MG/DL
CHOLEST/HDLC SERPL: 3.6 (CALC)
CO2 SERPL-SCNC: 23 MMOL/L (ref 20–32)
CREAT SERPL-MCNC: 0.99 MG/DL (ref 0.7–1.33)
GLOBULIN SER CALC-MCNC: 2.5 G/DL (CALC) (ref 1.9–3.7)
GLUCOSE SERPL-MCNC: 100 MG/DL (ref 65–99)
HBA1C MFR BLD: 5.1 % OF TOTAL HGB
HDLC SERPL-MCNC: 52 MG/DL
LDLC SERPL CALC-MCNC: 110 MG/DL (CALC)
NONHDLC SERPL-MCNC: 136 MG/DL (CALC)
POTASSIUM SERPL-SCNC: 4 MMOL/L (ref 3.5–5.3)
PROT SERPL-MCNC: 6.9 G/DL (ref 6.1–8.1)
SL AMB EGFR AFRICAN AMERICAN: 98 ML/MIN/1.73M2
SL AMB EGFR NON AFRICAN AMERICAN: 85 ML/MIN/1.73M2
SODIUM SERPL-SCNC: 140 MMOL/L (ref 135–146)
TRIGL SERPL-MCNC: 150 MG/DL
TSH SERPL-ACNC: 1.36 MIU/L (ref 0.4–4.5)

## 2021-12-28 ENCOUNTER — TELEMEDICINE (OUTPATIENT)
Dept: FAMILY MEDICINE CLINIC | Facility: CLINIC | Age: 56
End: 2021-12-28
Payer: COMMERCIAL

## 2021-12-28 DIAGNOSIS — Z12.5 PROSTATE CANCER SCREENING: ICD-10-CM

## 2021-12-28 DIAGNOSIS — E78.00 PURE HYPERCHOLESTEROLEMIA: Primary | ICD-10-CM

## 2021-12-28 PROCEDURE — 3725F SCREEN DEPRESSION PERFORMED: CPT | Performed by: FAMILY MEDICINE

## 2021-12-28 PROCEDURE — 99213 OFFICE O/P EST LOW 20 MIN: CPT | Performed by: FAMILY MEDICINE

## 2022-01-16 NOTE — PROGRESS NOTES
Virtual Regular Visit    Verification of patient location:    Patient is located in the following state in which I hold an active license [place]      Assessment/Plan:    Triglycerides 150  Rest of CMP thyroid and A1c normal   Recheck 6 months with repeat labs including PSA    Problem List Items Addressed This Visit        Other    Hyperlipidemia - Primary    Relevant Orders    Lipid panel    Comprehensive metabolic panel      Other Visit Diagnoses     Prostate cancer screening        Relevant Orders    PSA, Total Screen               Reason for visit is   Chief Complaint   Patient presents with    Virtual Regular Visit        Encounter provider Joshua Koch DO    Provider located at [address] [place] [address] [address]  [address] [address]  [phone]      Recent Visits  No visits were found meeting these conditions  Showing recent visits within past 7 days and meeting all other requirements  Future Appointments  No visits were found meeting these conditions  Showing future appointments within next 150 days and meeting all other requirements       The patient was identified by name and date of birth  Coleen Caraballo was informed that this is a telemedicine visit and that the visit is being conducted through [address] [place] [address] Now and patient was informed that this is a secure, HIPAA-compliant platform  He agrees to proceed     My office door was closed  No one else was in the room  He acknowledged consent and understanding of privacy and security of the video platform  The patient has agreed to participate and understands they can discontinue the visit at any time  Patient is aware this is a billable service  Subjective  Coleen Caraballo is a 64 y o  male    Patient for virtual video visit  Overall feels well  Labs done         Past Medical History:   Diagnosis Date    Calculus, ureter     Colon polyp     Diverticulosis     Enlarged prostate  GERD (gastroesophageal reflux disease)     Hiatal hernia     Hyperlipidemia     Kidney stone 2019    Multiple benign polyps of large intestine     Pain in joint of right shoulder region     Psoriasis     hands and scalp    Rectal polyp     Seasonal allergies     Seborrheic dermatitis of scalp     Wears glasses        Past Surgical History:   Procedure Laterality Date    APPENDECTOMY  01/1982    COLONOSCOPY  03/2005    FIBEROPTIC; [id number] -2012 ([address] [place] [address]) 2016 - 2019(Stalin) - 2024(Stalin)    LASIK      CORNEAL    OR CYSTO/URETERO W/LITHOTRIPSY &INDWELL STENT INSRT Left 1/15/2019    Procedure: CYSTO, URETEROSCOPY W/HOLMIUM LASER, RETROGRADE PYELOGRAM, STENT INSERTION;  Surgeon: Dimple Edmond MD;  [facility];  Service: Urology    SEPTOPLASTY  10/1996    VASECTOMY  11/2002    SURGERY VAS DEFERENS       Current Outpatient Medications   Medication Sig Dispense Refill    famotidine (PEPCID) 20 mg tablet TAKE 1 TABLET BY MOUTH TWICE A DAY AS NEEDED FOR HEARTBURN 180 tablet 3    fluticasone (FLONASE) 50 mcg/act nasal spray INSTILL 2 SPRAYS INTO EACH NOSTRIL DAILY AS NEEDED FOR RHINITIS 48 mL 1    levocetirizine (XYZAL) 5 MG tablet Take 1 tablet (5 mg total) by mouth every evening 90 tablet 3    mupirocin (BACTROBAN) 2 % ointment APPLY A SMALL AMOUNT TO THE OPENED SCRATCHED AREAS ON THE HANDS TWICE DAILY UNTIL WELL HEALED   rosuvastatin (CRESTOR) 5 mg tablet TAKE 1 TABLET BY MOUTH EVERY DAY 90 tablet 0    tamsulosin (FLOMAX) 0 4 mg Take 1 capsule (0 4 mg total) by mouth daily with dinner 90 capsule 3    triamcinolone (KENALOG) 0 1 % cream APPLY TO HAND ECZEMA TWICE DAILY FOR TWO WEEKS ONLY AS NEEDED FOR FLARING RASH  No current facility-administered medications for this visit  No Known Allergies    Review of Systems   Constitutional: Negative for fatigue  Eyes: Negative for visual disturbance  Respiratory: Negative for cough and shortness of breath      Cardiovascular: Negative for chest pain, palpitations and leg swelling  Gastrointestinal: Negative for abdominal pain  Neurological: Negative for dizziness and headaches  Psychiatric/Behavioral: The patient is not nervous/anxious  Video Exam    There were no vitals filed for this visit  Physical Exam  Constitutional:       General: He is not in acute distress  HENT:      Head: Normocephalic  Eyes:      General: No scleral icterus  Pulmonary:      Effort: Pulmonary effort is normal  No respiratory distress  Neurological:      Mental Status: He is alert and oriented to person, place, and time  Psychiatric:         Behavior: Behavior normal          Thought Content: Thought content normal           I spent 15 minutes directly with the patient during this visit    VIRTUAL VISIT DISCLAIMER      Maria Elena Mao verbally agrees to participate in [employer]  Pt is aware that [employer] could be limited without vital signs or the ability to perform a full hands-on physical exam  Neo Cooper understands he or the provider may request at any time to terminate the video visit and request the patient to seek care or treatment in person

## 2022-01-25 PROBLEM — R73.01 ELEVATED FASTING BLOOD SUGAR: Status: ACTIVE | Noted: 2022-01-25

## 2022-01-27 ENCOUNTER — CLINICAL SUPPORT (OUTPATIENT)
Dept: FAMILY MEDICINE CLINIC | Facility: CLINIC | Age: 57
End: 2022-01-27
Payer: COMMERCIAL

## 2022-01-27 DIAGNOSIS — Z23 ENCOUNTER FOR IMMUNIZATION: Primary | ICD-10-CM

## 2022-01-27 PROCEDURE — 90471 IMMUNIZATION ADMIN: CPT

## 2022-01-27 PROCEDURE — 90750 HZV VACC RECOMBINANT IM: CPT

## 2022-02-22 DIAGNOSIS — E78.00 PURE HYPERCHOLESTEROLEMIA: ICD-10-CM

## 2022-02-22 RX ORDER — ROSUVASTATIN CALCIUM 5 MG/1
TABLET, COATED ORAL
Qty: 90 TABLET | Refills: 0 | Status: SHIPPED | OUTPATIENT
Start: 2022-02-22 | End: 2022-05-24

## 2022-05-24 DIAGNOSIS — E78.00 PURE HYPERCHOLESTEROLEMIA: ICD-10-CM

## 2022-05-24 RX ORDER — ROSUVASTATIN CALCIUM 5 MG/1
TABLET, COATED ORAL
Qty: 90 TABLET | Refills: 0 | Status: SHIPPED | OUTPATIENT
Start: 2022-05-24 | End: 2022-07-24

## 2022-06-22 LAB
ALBUMIN SERPL-MCNC: 4.2 G/DL (ref 3.6–5.1)
ALBUMIN/GLOB SERPL: 1.9 (CALC) (ref 1–2.5)
ALP SERPL-CCNC: 55 U/L (ref 35–144)
ALT SERPL-CCNC: 25 U/L (ref 9–46)
AST SERPL-CCNC: 14 U/L (ref 10–35)
BILIRUB SERPL-MCNC: 0.4 MG/DL (ref 0.2–1.2)
BUN SERPL-MCNC: 20 MG/DL (ref 7–25)
BUN/CREAT SERPL: NORMAL (CALC) (ref 6–22)
CALCIUM SERPL-MCNC: 8.7 MG/DL (ref 8.6–10.3)
CHLORIDE SERPL-SCNC: 109 MMOL/L (ref 98–110)
CHOLEST SERPL-MCNC: 181 MG/DL
CHOLEST/HDLC SERPL: 3.6 (CALC)
CO2 SERPL-SCNC: 26 MMOL/L (ref 20–32)
CREAT SERPL-MCNC: 0.94 MG/DL (ref 0.7–1.33)
GLOBULIN SER CALC-MCNC: 2.2 G/DL (CALC) (ref 1.9–3.7)
GLUCOSE SERPL-MCNC: 95 MG/DL (ref 65–99)
HDLC SERPL-MCNC: 50 MG/DL
LDLC SERPL CALC-MCNC: 100 MG/DL (CALC)
NONHDLC SERPL-MCNC: 131 MG/DL (CALC)
POTASSIUM SERPL-SCNC: 4.1 MMOL/L (ref 3.5–5.3)
PROT SERPL-MCNC: 6.4 G/DL (ref 6.1–8.1)
PSA SERPL-MCNC: 1.84 NG/ML
SL AMB EGFR AFRICAN AMERICAN: 104 ML/MIN/1.73M2
SL AMB EGFR NON AFRICAN AMERICAN: 90 ML/MIN/1.73M2
SODIUM SERPL-SCNC: 140 MMOL/L (ref 135–146)
TRIGL SERPL-MCNC: 184 MG/DL

## 2022-07-01 DIAGNOSIS — J30.2 SEASONAL ALLERGIC RHINITIS, UNSPECIFIED TRIGGER: ICD-10-CM

## 2022-07-01 RX ORDER — LEVOCETIRIZINE DIHYDROCHLORIDE 5 MG/1
TABLET, FILM COATED ORAL
Qty: 90 TABLET | Refills: 3 | Status: SHIPPED | OUTPATIENT
Start: 2022-07-01

## 2022-07-23 DIAGNOSIS — E78.00 PURE HYPERCHOLESTEROLEMIA: ICD-10-CM

## 2022-07-24 RX ORDER — ROSUVASTATIN CALCIUM 5 MG/1
TABLET, COATED ORAL
Qty: 90 TABLET | Refills: 0 | Status: SHIPPED | OUTPATIENT
Start: 2022-07-24 | End: 2022-07-29 | Stop reason: SDUPTHER

## 2022-07-29 ENCOUNTER — OFFICE VISIT (OUTPATIENT)
Dept: FAMILY MEDICINE CLINIC | Facility: CLINIC | Age: 57
End: 2022-07-29
Payer: COMMERCIAL

## 2022-07-29 VITALS
HEART RATE: 86 BPM | TEMPERATURE: 96.6 F | DIASTOLIC BLOOD PRESSURE: 92 MMHG | SYSTOLIC BLOOD PRESSURE: 142 MMHG | RESPIRATION RATE: 16 BRPM | OXYGEN SATURATION: 97 % | HEIGHT: 68 IN | WEIGHT: 194.8 LBS | BODY MASS INDEX: 29.52 KG/M2

## 2022-07-29 DIAGNOSIS — J30.9 ALLERGIC RHINITIS, UNSPECIFIED SEASONALITY, UNSPECIFIED TRIGGER: ICD-10-CM

## 2022-07-29 DIAGNOSIS — E78.00 PURE HYPERCHOLESTEROLEMIA: ICD-10-CM

## 2022-07-29 DIAGNOSIS — Z00.00 ANNUAL PHYSICAL EXAM: Primary | ICD-10-CM

## 2022-07-29 DIAGNOSIS — Z82.49 FAMILY HISTORY OF HYPERTENSION: ICD-10-CM

## 2022-07-29 DIAGNOSIS — K21.00 ESOPHAGITIS, REFLUX: ICD-10-CM

## 2022-07-29 DIAGNOSIS — R03.0 ELEVATED BLOOD PRESSURE READING IN OFFICE WITHOUT DIAGNOSIS OF HYPERTENSION: ICD-10-CM

## 2022-07-29 PROCEDURE — 99396 PREV VISIT EST AGE 40-64: CPT | Performed by: FAMILY MEDICINE

## 2022-07-29 RX ORDER — FAMOTIDINE 20 MG/1
TABLET, FILM COATED ORAL
Qty: 180 TABLET | Refills: 3 | Status: SHIPPED | OUTPATIENT
Start: 2022-07-29

## 2022-07-29 RX ORDER — FLUTICASONE PROPIONATE 50 MCG
2 SPRAY, SUSPENSION (ML) NASAL DAILY
Qty: 48 ML | Refills: 1 | Status: SHIPPED | OUTPATIENT
Start: 2022-07-29

## 2022-07-29 RX ORDER — ROSUVASTATIN CALCIUM 5 MG/1
5 TABLET, COATED ORAL DAILY
Qty: 90 TABLET | Refills: 0 | Status: SHIPPED | OUTPATIENT
Start: 2022-07-29

## 2022-07-29 NOTE — PATIENT INSTRUCTIONS

## 2022-07-29 NOTE — PROGRESS NOTES
BMI Counseling: Body mass index is 29 62 kg/m²  The BMI is above normal  Nutrition recommendations include reducing portion sizes, decreasing overall calorie intake, 3-5 servings of fruits/vegetables daily, reducing fast food intake, consuming healthier snacks, decreasing soda and/or juice intake, moderation in carbohydrate intake, increasing intake of lean protein, reducing intake of saturated fat and trans fat and reducing intake of cholesterol  Exercise recommendations include exercising 3-5 times per week   [facility]    NAME: April Rivera  AGE: 62 y o  SEX: male  : 1965     DATE: 2022     Assessment and Plan:     Problem List Items Addressed This Visit        Other    Hyperlipidemia    Relevant Medications    rosuvastatin (CRESTOR) 5 mg tablet      Other Visit Diagnoses     Annual physical exam    -  Primary    Elevated blood pressure reading in office without diagnosis of hypertension        Allergic rhinitis, unspecified seasonality, unspecified trigger        Relevant Medications    fluticasone (FLONASE) 50 mcg/act nasal spray    Family history of hypertension            Patient is reluctant to start blood pressure med at this point but he will monitor over the next several weeks and report blood pressure numbers to my chart "  We did discuss likely using something like losartan (he states that his father takes that)  Immunizations and preventive care screenings were discussed with patient today  Appropriate education was printed on patient's after visit summary  Counseling:  Alcohol/drug use: discussed moderation in alcohol intake, the recommendations for healthy alcohol use  Dental Health: discussed importance of regular tooth brushing, flossing, and dental visits    Injury prevention: discussed safety/seat belts, safety helmets, smoke detectors, carbon dioxide detectors, and smoking near bedding or upholstery  · Sexual health:no issues  · Exercise: the importance of regular exercise/physical activity was discussed  Recommend exercise 3-5 times per week for at least 30 minutes  Depression Screening and Follow-up Plan: Patient was screened for depression during today's encounter  They screened negative with a PHQ-2 score of 0  Chief Complaint:     Chief Complaint   Patient presents with    Follow-up     6 month follow up  Pt is Covid vaccinated and will provide documentation via My chart       History of Present Illness:     Adult Annual Physical   Patient here for a comprehensive physical exam  The patient reports nasal congestion patient uses Flonase and prescriptions is XYZAL  Discussed that this may actually be drawing the mucus up causing the nasal congestion  He will discontinue XYZAL remain on the Flonase and add nasal saline irrigation  BP at home has been borderline  There is significant family history  Diet and Physical Activity  · Diet/Nutrition: well balanced diet  · Exercise: walking  Depression Screening  PHQ-2/9 Depression Screening    Little interest or pleasure in doing things: 0 - not at all  Feeling down, depressed, or hopeless: 0 - not at all  PHQ-2 Score: 0  PHQ-2 Interpretation: Negative depression screen       General Health  · Sleep: snoring  · Hearing: normal - bilateral   · Vision: wears glasses  · Dental: regular dental visits   Health  · Symptoms include: none     Review of Systems:     Review of Systems   Constitutional: Negative for fatigue  Cardiovascular: Negative for chest pain  Genitourinary:        Occasionally feeling some right flank pain  Patient has a history of kidney stones  At this point he will monitor for acuity   Neurological: Negative for syncope and headaches  All other systems reviewed and are negative       Past Medical History:     Past Medical History:   Diagnosis Date    Calculus, ureter     Colon polyp     Diverticulosis     Enlarged prostate     GERD (gastroesophageal reflux disease)     Hiatal hernia     Hyperlipidemia     Kidney stone     Multiple benign polyps of large intestine     Pain in joint of right shoulder region     Psoriasis     hands and scalp    Rectal polyp     Seasonal allergies     Seborrheic dermatitis of scalp     Wears glasses       Past Surgical History:     Past Surgical History:   Procedure Laterality Date    APPENDECTOMY  1982    COLONOSCOPY  2005    FIBEROPTIC; [id number] - (STASIK)  - (Stalin) - (Stalin)    LASIK      CORNEAL    OR CYSTO/URETERO W/LITHOTRIPSY &INDWELL STENT INSRT Left 1/15/2019    Procedure: CYSTO, URETEROSCOPY W/HOLMIUM LASER, RETROGRADE PYELOGRAM, STENT INSERTION;  Surgeon: Letty King MD;  Location: [facility];  Service: Urology    SEPTOPLASTY  10/1996    VASECTOMY  2002    SURGERY VAS DEFERENS      Family History:     Family History   Problem Relation Age of Onset    Other Mother         BACK PAIN    Hypertension Mother     Melanoma Father         MALIGNANT MELANOMA OF THE SKIN    Prostate cancer Father     Coronary artery disease Father     Myocarditis Father     Osteoporosis Brother       Social History:     Social History     Socioeconomic History    Marital status: /Civil Union     Spouse name: None    Number of children: None    Years of education: None    Highest education level: None   Occupational History    None   Tobacco Use    Smoking status: Former Smoker     Quit date:      Years since quittin 5    Smokeless tobacco: Former User     Quit date:    Vaping Use    Vaping Use: Never used   Substance and Sexual Activity    Alcohol use: Yes     Comment: 3 x weekly    Drug use: No    Sexual activity: None   Other Topics Concern    None   Social History Narrative    None     Social Determinants of Health     Financial Resource Strain: Not on file   Food Insecurity: Not on file Transportation Needs: Not on file   Physical Activity: Not on file   Stress: Not on file   Social Connections: Not on file   Intimate Partner Violence: Not on file   Housing Stability: Not on file      Current Medications:     Current Outpatient Medications   Medication Sig Dispense Refill    fluticasone (FLONASE) 50 mcg/act nasal spray 2 sprays into each nostril daily 48 mL 1    levocetirizine (XYZAL) 5 MG tablet TAKE 1 TABLET BY MOUTH EVERY DAY IN THE EVENING 90 tablet 3    mupirocin (BACTROBAN) 2 % ointment APPLY A SMALL AMOUNT TO THE OPENED SCRATCHED AREAS ON THE HANDS TWICE DAILY UNTIL WELL HEALED   rosuvastatin (CRESTOR) 5 mg tablet Take 1 tablet (5 mg total) by mouth daily 90 tablet 0    tamsulosin (FLOMAX) 0 4 mg Take 1 capsule (0 4 mg total) by mouth daily with dinner 90 capsule 3    triamcinolone (KENALOG) 0 1 % cream APPLY TO HAND ECZEMA TWICE DAILY FOR TWO WEEKS ONLY AS NEEDED FOR FLARING RASH   famotidine (PEPCID) 20 mg tablet TAKE 1 TABLET BY MOUTH TWICE A DAY AS NEEDED FOR HEARTBURN 180 tablet 3     No current facility-administered medications for this visit  Allergies:     No Known Allergies   Physical Exam:     /92   Pulse 86   Temp (!) 96 6 °F (35 9 °C) (Temporal)   Resp 16   Ht 5' 8" (1 727 m)   Wt 88 4 kg (194 lb 12 8 oz)   SpO2 97%   BMI 29 62 kg/m²     Physical Exam  Vitals and nursing note reviewed  Constitutional:       Appearance: Normal appearance  He is well-developed  Comments: 80-year-old male who appears stated age with BMI of 29%   HENT:      Head: Normocephalic and atraumatic  Right Ear: Tympanic membrane normal       Left Ear: Tympanic membrane normal       Nose: Congestion (Turbinates enlarge) present  Mouth/Throat:      Mouth: Mucous membranes are moist    Eyes:      Conjunctiva/sclera: Conjunctivae normal    Neck:      Vascular: No carotid bruit  Cardiovascular:      Rate and Rhythm: Normal rate and regular rhythm        Heart sounds: No murmur heard  Pulmonary:      Effort: Pulmonary effort is normal  No respiratory distress  Breath sounds: Normal breath sounds  Abdominal:      General: Bowel sounds are normal       Palpations: Abdomen is soft  Tenderness: There is no abdominal tenderness  There is no right CVA tenderness or left CVA tenderness  Comments: No abdominal bruit   Musculoskeletal:      Cervical back: Neck supple  Skin:     General: Skin is warm and dry  Neurological:      Mental Status: He is alert and oriented to person, place, and time  Psychiatric:         Mood and Affect: Mood normal          Behavior: Behavior normal          Thought Content:  Thought content normal          Judgment: Judgment normal           DO RIKY Gr[facility] [place] [facility]

## 2022-08-05 DIAGNOSIS — I10 BENIGN HYPERTENSION: Primary | ICD-10-CM

## 2022-08-05 RX ORDER — LOSARTAN POTASSIUM 25 MG/1
25 TABLET ORAL DAILY
Qty: 30 TABLET | Refills: 5 | Status: SHIPPED | OUTPATIENT
Start: 2022-08-05 | End: 2022-08-29

## 2022-08-27 DIAGNOSIS — I10 BENIGN HYPERTENSION: ICD-10-CM

## 2022-08-29 RX ORDER — LOSARTAN POTASSIUM 25 MG/1
25 TABLET ORAL DAILY
Qty: 90 TABLET | Refills: 2 | Status: SHIPPED | OUTPATIENT
Start: 2022-08-29 | End: 2022-08-31 | Stop reason: SDUPTHER

## 2022-08-31 DIAGNOSIS — I10 BENIGN HYPERTENSION: ICD-10-CM

## 2022-08-31 RX ORDER — LOSARTAN POTASSIUM 25 MG/1
25 TABLET ORAL DAILY
Qty: 90 TABLET | Refills: 1 | Status: SHIPPED | OUTPATIENT
Start: 2022-08-31

## 2022-09-27 ENCOUNTER — TELEPHONE (OUTPATIENT)
Dept: UROLOGY | Facility: MEDICAL CENTER | Age: 57
End: 2022-09-27

## 2022-09-27 NOTE — TELEPHONE ENCOUNTER
Pt under care of: Dr Laura Pedroza     Last Seen: 9/11/2019 *considered a NP now*    Reason for call: Patient's wife called in for her   In the past two-three weeks he has been having symptoms for a kidney stones  He has a lot of lower back pain, stomach discomfort  At the moment he is not having any urinary issues  No blood in the urine  No testing has been done  Patient is scheduled 10/18 with Dr Citlaly Orozco  Patient will call back in if he starts having any urinary symptoms or if the pain gets worse  Also will get in contact with PCP

## 2022-10-18 ENCOUNTER — OFFICE VISIT (OUTPATIENT)
Dept: UROLOGY | Facility: MEDICAL CENTER | Age: 57
End: 2022-10-18
Payer: COMMERCIAL

## 2022-10-18 ENCOUNTER — TELEPHONE (OUTPATIENT)
Dept: UROLOGY | Facility: MEDICAL CENTER | Age: 57
End: 2022-10-18

## 2022-10-18 VITALS
HEIGHT: 68 IN | SYSTOLIC BLOOD PRESSURE: 110 MMHG | WEIGHT: 195 LBS | DIASTOLIC BLOOD PRESSURE: 68 MMHG | OXYGEN SATURATION: 98 % | HEART RATE: 74 BPM | BODY MASS INDEX: 29.55 KG/M2

## 2022-10-18 DIAGNOSIS — N40.0 BPH WITHOUT OBSTRUCTION/LOWER URINARY TRACT SYMPTOMS: ICD-10-CM

## 2022-10-18 DIAGNOSIS — Z87.442 HISTORY OF KIDNEY STONES: Primary | ICD-10-CM

## 2022-10-18 DIAGNOSIS — Z12.5 PROSTATE CANCER SCREENING: ICD-10-CM

## 2022-10-18 DIAGNOSIS — M54.50 ACUTE BILATERAL LOW BACK PAIN WITHOUT SCIATICA: ICD-10-CM

## 2022-10-18 DIAGNOSIS — Z80.42 FAMILY HISTORY OF PROSTATE CANCER IN FATHER: ICD-10-CM

## 2022-10-18 LAB
SL AMB  POCT GLUCOSE, UA: NORMAL
SL AMB LEUKOCYTE ESTERASE,UA: NORMAL
SL AMB POCT BILIRUBIN,UA: NORMAL
SL AMB POCT BLOOD,UA: NORMAL
SL AMB POCT CLARITY,UA: CLEAR
SL AMB POCT COLOR,UA: YELLOW
SL AMB POCT KETONES,UA: NORMAL
SL AMB POCT NITRITE,UA: NORMAL
SL AMB POCT PH,UA: 5.5
SL AMB POCT SPECIFIC GRAVITY,UA: 1.02
SL AMB POCT URINE PROTEIN: NORMAL
SL AMB POCT UROBILINOGEN: 0.2

## 2022-10-18 PROCEDURE — 81003 URINALYSIS AUTO W/O SCOPE: CPT | Performed by: UROLOGY

## 2022-10-18 PROCEDURE — 99204 OFFICE O/P NEW MOD 45 MIN: CPT | Performed by: UROLOGY

## 2022-10-18 NOTE — TELEPHONE ENCOUNTER
Pt's wife called and stated that the CT scan has been scheduled at [facility] for October 21 at 2:30 and there needs to be a pre authorization done       Please review and advice     Pt's wife can be reached at [phone]

## 2022-10-18 NOTE — PROGRESS NOTES
Assessment/Plan:  1  History of kidney stones-clinical presentation of pain is not convincing of a stone however patient did have a previous ureteral stone on 2 occasions 1 of which required ureteroscopic surgery  Will repeat CT stone study  2  Acute bilateral low back pain without sciatica-resolved-doubtful renal colic    3  Prostate cancer screening-PERLITA and PSA not suspicious for malignancy  Repeat 1 year  4  BPH without obstructive symptoms-no intervention     5  Family history of prostate cancer in father-repeat PSA PERLITA 1 year    No problem-specific Assessment & Plan notes found for this encounter  Diagnoses and all orders for this visit:    History of kidney stones  -     POCT urine dip auto non-scope    Acute bilateral low back pain without sciatica    Prostate cancer screening    BPH without obstruction/lower urinary tract symptoms    Family history of prostate cancer in father          Subjective:      Patient ID: Tricia Tapia is a 62 y o  male  HPI  80-year-old male with PSA of 1 8 noted that approximately 2-3 weeks ago he developed bilateral low back pain without specific laterality or radiation  The pain has now resolved  Because of previous history of ureteral stones the patient presents for evaluation  The patient is voiding well with a good urinary stream no nocturia frequency urgency and denies dysuria and gross hematuria  The following portions of the patient's history were reviewed and updated as appropriate: allergies, current medications, past family history, past medical history, past social history, past surgical history and problem list     Review of Systems   All other systems reviewed and are negative  Objective:      /68   Pulse 74   Ht 5' 8" (1 727 m)   Wt 88 5 kg (195 lb)   SpO2 98%   BMI 29 65 kg/m²          Physical Exam  Vitals reviewed  Constitutional:       General: He is not in acute distress  Appearance: Normal appearance   He is not ill-appearing, toxic-appearing or diaphoretic  HENT:      Head: Normocephalic  Nose: Nose normal       Mouth/Throat:      Mouth: Mucous membranes are moist    Eyes:      Extraocular Movements: Extraocular movements intact  Pulmonary:      Effort: Pulmonary effort is normal  No respiratory distress  Abdominal:      General: There is no distension  Palpations: Abdomen is soft  Genitourinary:     Penis: Normal        Testes: Normal       Rectum: Normal       Comments: PERLITA normal anal verge normal anal sphincter tone no evidence of rectal masses  Prostate 30 g a nodular nontender  Skin:     General: Skin is dry  Neurological:      Mental Status: He is alert and oriented to person, place, and time  Psychiatric:         Mood and Affect: Mood normal          Behavior: Behavior normal          Thought Content:  Thought content normal          Judgment: Judgment normal

## 2022-10-18 NOTE — TELEPHONE ENCOUNTER
Patient saw Dr Angel Hernandez today  He ordered a CT scan for the patient  The patient and wife want to get the CT scan done at an outside facility  They are going to schedule this and let us know when it is so we can get authorization  They said they will get a disc and bring into office prior to appt

## 2022-10-21 ENCOUNTER — IMMUNIZATIONS (OUTPATIENT)
Dept: FAMILY MEDICINE CLINIC | Facility: CLINIC | Age: 57
End: 2022-10-21
Payer: COMMERCIAL

## 2022-10-21 ENCOUNTER — TELEPHONE (OUTPATIENT)
Dept: UROLOGY | Facility: MEDICAL CENTER | Age: 57
End: 2022-10-21

## 2022-10-21 DIAGNOSIS — Z23 NEEDS FLU SHOT: Primary | ICD-10-CM

## 2022-10-21 PROCEDURE — 90682 RIV4 VACC RECOMBINANT DNA IM: CPT

## 2022-10-21 PROCEDURE — 90471 IMMUNIZATION ADMIN: CPT

## 2022-10-27 ENCOUNTER — TELEPHONE (OUTPATIENT)
Dept: UROLOGY | Facility: MEDICAL CENTER | Age: 57
End: 2022-10-27

## 2022-11-09 ENCOUNTER — OFFICE VISIT (OUTPATIENT)
Dept: UROLOGY | Facility: MEDICAL CENTER | Age: 57
End: 2022-11-09

## 2022-11-09 VITALS
DIASTOLIC BLOOD PRESSURE: 78 MMHG | HEIGHT: 68 IN | HEART RATE: 75 BPM | BODY MASS INDEX: 29.55 KG/M2 | WEIGHT: 195 LBS | OXYGEN SATURATION: 97 % | SYSTOLIC BLOOD PRESSURE: 122 MMHG

## 2022-11-09 DIAGNOSIS — N40.0 BPH WITHOUT OBSTRUCTION/LOWER URINARY TRACT SYMPTOMS: ICD-10-CM

## 2022-11-09 DIAGNOSIS — M54.50 ACUTE BILATERAL LOW BACK PAIN WITHOUT SCIATICA: ICD-10-CM

## 2022-11-09 DIAGNOSIS — Z80.42 FAMILY HISTORY OF PROSTATE CANCER IN FATHER: ICD-10-CM

## 2022-11-09 DIAGNOSIS — N20.0 CALCULUS OF KIDNEY: Primary | ICD-10-CM

## 2022-11-09 NOTE — PROGRESS NOTES
Assessment/Plan:  1  Calculus of kidney-small upper pole renal calculus in nonobstructive position  Fluid hydration low-salt diet and lemon water suggested  Further workup if stone formation occurs over the next number of years  2  BPH without obstruction-patient does have some urinary frequency but otherwise is voiding adequately  Do not suggest further intervention  3  Family history of prostate cancer-PERLITA and PSA 1 year  4  Acute bilateral low back pain-resolved  No problem-specific Assessment & Plan notes found for this encounter  Diagnoses and all orders for this visit:    Calculus of kidney  -     Comprehensive metabolic panel; Future    BPH without obstruction/lower urinary tract symptoms  -     PSA Total, Diagnostic; Future  -     Comprehensive metabolic panel; Future    Family history of prostate cancer in father  -     PSA Total, Diagnostic; Future    Acute bilateral low back pain without sciatica          Subjective:      Patient ID: Dayron Power is a 62 y o  male  HPI  55-year-old male with a history of kidney stones presents for follow-up discussion of CT stone study which reveals no evidence of hydronephrosis or ureteral calculi  There is a small calculus in the upper pole on nonobstructive position  The patient denies any further back pain  He is voiding adequately  A small perihepatic lesion was discussed and will be followed up by PCP  The following portions of the patient's history were reviewed and updated as appropriate: allergies, current medications, past family history, past medical history, past social history, past surgical history and problem list     Review of Systems   Musculoskeletal: Positive for arthralgias, back pain and myalgias  All other systems reviewed and are negative  Objective:      /78   Pulse 75   Ht 5' 8" (1 727 m)   Wt 88 5 kg (195 lb)   SpO2 97%   BMI 29 65 kg/m²          Physical Exam  Vitals reviewed     Constitutional: General: He is not in acute distress  Appearance: Normal appearance  He is not ill-appearing, toxic-appearing or diaphoretic  HENT:      Head: Normocephalic and atraumatic  Mouth/Throat:      Mouth: Mucous membranes are moist    Eyes:      Extraocular Movements: Extraocular movements intact  Pulmonary:      Effort: Pulmonary effort is normal  No respiratory distress  Skin:     General: Skin is dry  Neurological:      Mental Status: He is alert and oriented to person, place, and time  Psychiatric:         Mood and Affect: Mood normal          Behavior: Behavior normal          Thought Content:  Thought content normal          Judgment: Judgment normal

## 2023-02-22 ENCOUNTER — TELEPHONE (OUTPATIENT)
Dept: FAMILY MEDICINE CLINIC | Facility: CLINIC | Age: 58
End: 2023-02-22

## 2023-02-22 DIAGNOSIS — E78.00 PURE HYPERCHOLESTEROLEMIA: Primary | ICD-10-CM

## 2023-02-22 DIAGNOSIS — R73.01 ELEVATED FASTING BLOOD SUGAR: ICD-10-CM

## 2023-02-22 NOTE — TELEPHONE ENCOUNTER
Notified Sergio Rodriguez that the labs were entered and not to get the 2 labs done that were ordered by Dr Acosta Crow and Sergio Rodriguez understood

## 2023-02-22 NOTE — TELEPHONE ENCOUNTER
Patient's wife called asking if patient's lab work was entered into [address] [place] [address] Rd  Per patient message they were to be entered but I don't see anything at this time  Can you please enter the lab work to be done so he can go to [facility] to have them done

## 2023-02-24 DIAGNOSIS — I10 BENIGN HYPERTENSION: ICD-10-CM

## 2023-02-24 RX ORDER — LOSARTAN POTASSIUM 25 MG/1
25 TABLET ORAL DAILY
Qty: 90 TABLET | Refills: 1 | Status: SHIPPED | OUTPATIENT
Start: 2023-02-24

## 2023-02-25 LAB
ALBUMIN SERPL-MCNC: 4.3 G/DL (ref 3.6–5.1)
ALBUMIN/GLOB SERPL: 1.7 (CALC) (ref 1–2.5)
ALP SERPL-CCNC: 67 U/L (ref 35–144)
ALT SERPL-CCNC: 26 U/L (ref 9–46)
AST SERPL-CCNC: 15 U/L (ref 10–35)
BILIRUB SERPL-MCNC: 0.3 MG/DL (ref 0.2–1.2)
BUN SERPL-MCNC: 18 MG/DL (ref 7–25)
BUN/CREAT SERPL: ABNORMAL (CALC) (ref 6–22)
CALCIUM SERPL-MCNC: 9 MG/DL (ref 8.6–10.3)
CHLORIDE SERPL-SCNC: 106 MMOL/L (ref 98–110)
CHOLEST SERPL-MCNC: 141 MG/DL
CHOLEST/HDLC SERPL: 3.2 (CALC)
CO2 SERPL-SCNC: 28 MMOL/L (ref 20–32)
CREAT SERPL-MCNC: 0.95 MG/DL (ref 0.7–1.3)
GFR/BSA.PRED SERPLBLD CYS-BASED-ARV: 93 ML/MIN/1.73M2
GLOBULIN SER CALC-MCNC: 2.6 G/DL (CALC) (ref 1.9–3.7)
GLUCOSE SERPL-MCNC: 100 MG/DL (ref 65–99)
HBA1C MFR BLD: 5.4 % OF TOTAL HGB
HDLC SERPL-MCNC: 44 MG/DL
LDLC SERPL CALC-MCNC: 76 MG/DL (CALC)
NONHDLC SERPL-MCNC: 97 MG/DL (CALC)
POTASSIUM SERPL-SCNC: 4 MMOL/L (ref 3.5–5.3)
PROT SERPL-MCNC: 6.9 G/DL (ref 6.1–8.1)
SODIUM SERPL-SCNC: 142 MMOL/L (ref 135–146)
TRIGL SERPL-MCNC: 127 MG/DL

## 2023-02-27 ENCOUNTER — OFFICE VISIT (OUTPATIENT)
Dept: FAMILY MEDICINE CLINIC | Facility: CLINIC | Age: 58
End: 2023-02-27

## 2023-02-27 VITALS
SYSTOLIC BLOOD PRESSURE: 140 MMHG | WEIGHT: 198.4 LBS | HEIGHT: 68 IN | DIASTOLIC BLOOD PRESSURE: 82 MMHG | TEMPERATURE: 97.8 F | BODY MASS INDEX: 30.07 KG/M2

## 2023-02-27 DIAGNOSIS — J06.9 UPPER RESPIRATORY TRACT INFECTION, UNSPECIFIED TYPE: Primary | ICD-10-CM

## 2023-02-27 DIAGNOSIS — J02.9 SORE THROAT: ICD-10-CM

## 2023-02-27 LAB — S PYO AG THROAT QL: NEGATIVE

## 2023-02-27 RX ORDER — PREDNISONE 10 MG/1
TABLET ORAL
Qty: 15 TABLET | Refills: 0 | Status: SHIPPED | OUTPATIENT
Start: 2023-02-27 | End: 2023-03-04

## 2023-02-27 NOTE — PROGRESS NOTES
Chief Complaint   Patient presents with   • Cough   • Headache   • Sore Throat   • Nasal Congestion     Name: Faith Millard      : 1965      MRN: [id number]  Encounter Provider: Reed Waldrop MD  Encounter Date: 2023   Encounter department: [facility] PR[facility]    Assessment & Plan     Rapid strep negative in office  We will check COVID flu swab  We will send prednisone taper  Recommend stopping Advil when using prednisone  Increase p o  hydration  We will call patient with results  1  Upper respiratory tract infection, unspecified type  -     predniSONE 10 mg tablet; Take 5 tablets (50 mg total) by mouth daily for 1 day, THEN 4 tablets (40 mg total) daily for 1 day, THEN 3 tablets (30 mg total) daily for 1 day, THEN 2 tablets (20 mg total) daily for 1 day, THEN 1 tablet (10 mg total) daily for 1 day  -     Covid/Flu- Office Collect    2  Sore throat  -     POCT rapid strepA         Subjective      He presents today with with a 1 week history of URI symptoms  States that it started after returning from a trip to [place]  Denies any fevers  Similar symptoms in his wife  COVID-negative at home  Review of Systems   Constitutional: Negative for activity change, appetite change, chills, fatigue and fever  HENT: Positive for congestion, rhinorrhea and sore throat  Negative for sneezing  Eyes: Negative for pain, discharge, redness and itching  Respiratory: Positive for cough  Negative for chest tightness, shortness of breath and wheezing  Cardiovascular: Negative for chest pain and palpitations  Gastrointestinal: Negative for abdominal pain, constipation, diarrhea, nausea and vomiting  Musculoskeletal: Negative for arthralgias, gait problem, myalgias and neck pain  Skin: Negative for rash  Neurological: Positive for headaches  Negative for dizziness, weakness and numbness  Hematological: Positive for adenopathy     Psychiatric/Behavioral: Negative for dysphoric mood  All other systems reviewed and are negative  Current Outpatient Medications on File Prior to Visit   Medication Sig   • famotidine (PEPCID) 20 mg tablet TAKE 1 TABLET BY MOUTH TWICE A DAY AS NEEDED FOR HEARTBURN   • fluticasone (FLONASE) 50 mcg/act nasal spray 2 sprays into each nostril daily   • levocetirizine (XYZAL) 5 MG tablet TAKE 1 TABLET BY MOUTH EVERY DAY IN THE EVENING   • losartan (COZAAR) 25 mg tablet TAKE 1 TABLET (25 MG TOTAL) BY MOUTH DAILY  • mupirocin (BACTROBAN) 2 % ointment APPLY A SMALL AMOUNT TO THE OPENED SCRATCHED AREAS ON THE HANDS TWICE DAILY UNTIL WELL HEALED  • rosuvastatin (CRESTOR) 5 mg tablet TAKE 1 TABLET (5 MG TOTAL) BY MOUTH DAILY  • triamcinolone (KENALOG) 0 1 % cream APPLY TO HAND ECZEMA TWICE DAILY FOR TWO WEEKS ONLY AS NEEDED FOR FLARING RASH  Objective     /82   Temp 97 8 °F (36 6 °C)   Ht 5' 8" (1 727 m)   Wt 90 kg (198 lb 6 4 oz)   BMI 30 17 kg/m²     Physical Exam  Vitals reviewed  Constitutional:       General: He is not in acute distress  Appearance: He is well-developed  He is not toxic-appearing or diaphoretic  HENT:      Head: Normocephalic and atraumatic  Right Ear: Tympanic membrane and ear canal normal  No middle ear effusion  Tympanic membrane is not erythematous  Left Ear: Tympanic membrane and ear canal normal   No middle ear effusion  Tympanic membrane is not erythematous  Nose: Congestion present  Mouth/Throat:      Pharynx: Posterior oropharyngeal erythema present  No pharyngeal swelling or oropharyngeal exudate  Eyes:      General: No scleral icterus  Right eye: No discharge  Left eye: No discharge  Conjunctiva/sclera: Conjunctivae normal    Cardiovascular:      Rate and Rhythm: Normal rate and regular rhythm  Heart sounds: Normal heart sounds  No murmur heard  Pulmonary:      Effort: Pulmonary effort is normal  No respiratory distress        Breath sounds: Normal breath sounds  No wheezing  Abdominal:      General: There is no distension  Palpations: Abdomen is soft  Tenderness: There is no abdominal tenderness  Musculoskeletal:         General: No tenderness  Normal range of motion  Lymphadenopathy:      Cervical: Cervical adenopathy present  Skin:     General: Skin is warm  Findings: No erythema or rash  Neurological:      Mental Status: He is alert     Psychiatric:         Mood and Affect: Mood normal          Behavior: Behavior normal        Herman Wong MD

## 2023-02-28 LAB
FLUAV RNA RESP QL NAA+PROBE: NEGATIVE
FLUBV RNA RESP QL NAA+PROBE: NEGATIVE
SARS-COV-2 RNA RESP QL NAA+PROBE: NEGATIVE

## 2023-03-01 ENCOUNTER — OFFICE VISIT (OUTPATIENT)
Dept: FAMILY MEDICINE CLINIC | Facility: CLINIC | Age: 58
End: 2023-03-01

## 2023-03-01 VITALS
OXYGEN SATURATION: 98 % | TEMPERATURE: 97 F | BODY MASS INDEX: 29.03 KG/M2 | HEIGHT: 69 IN | RESPIRATION RATE: 16 BRPM | SYSTOLIC BLOOD PRESSURE: 128 MMHG | DIASTOLIC BLOOD PRESSURE: 78 MMHG | HEART RATE: 68 BPM | WEIGHT: 196 LBS

## 2023-03-01 DIAGNOSIS — I10 BENIGN HYPERTENSION: ICD-10-CM

## 2023-03-01 DIAGNOSIS — K21.00 GASTROESOPHAGEAL REFLUX DISEASE WITH ESOPHAGITIS WITHOUT HEMORRHAGE: Primary | ICD-10-CM

## 2023-03-01 DIAGNOSIS — E78.00 PURE HYPERCHOLESTEROLEMIA: ICD-10-CM

## 2023-03-01 RX ORDER — OMEPRAZOLE 20 MG/1
20 CAPSULE, DELAYED RELEASE ORAL
Qty: 90 CAPSULE | Refills: 0 | Status: SHIPPED | OUTPATIENT
Start: 2023-03-01

## 2023-03-01 NOTE — PROGRESS NOTES
Name: Kami Barrios      : 1965      MRN: [id number]  Encounter Provider: Tierra Westbrook DO  Encounter Date: 3/1/2023   Encounter department: [address] [place] [address] PRIMARY CARE    Assessment & Plan     1  Gastroesophageal reflux disease with esophagitis without hemorrhage  -     omeprazole (PriLOSEC) 20 mg delayed release capsule; Take 1 capsule (20 mg total) by mouth daily before breakfast    2  Benign hypertension    3  Pure hypercholesterolemia    Okay to trial omeprazole briefly  Patient will do for 4 weeks and then discontinue  Can continue on famotidine throughout that time  If GERD does exacerbate then will need EGD GI follow-up  Continue same treatment for hypertension and hypercholesterol  Follow-up in 6 months for annual physical blood as needed in between  Depression Screening and Follow-up Plan: Patient was screened for depression during today's encounter  They screened negative with a PHQ-2 score of 0        RESTART flonase and xyzal  The current medical regimen is effective;  continue present plan and medications  The patient is asked to continue to make an attempt to improve diet and exercise patterns to aid in medical management of this problem  Subjective     66-year-old male here for follow-up on blood pressure and hyperlipidemia  Had recent URI symptoms  Still slightly persistent but he has not been doing his Flonase and Xyzal   Instructed to begin back on those  He is trying to make some changes in fitness and dietary  Slow going  Lab work reviewed  Here recently COVID and strep test negative    Chief Complaint   Patient presents with   • Follow-up     6 month follow up      Component      Latest Ref Rng & Units 2023   Glucose, Random      65 - 99 mg/dL 100 (H)   BUN      7 - 25 mg/dL 18   Creatinine      0 70 - 1 30 mg/dL 0 95   eGFR      > OR = 60 mL/min/1 73m2 93   SL AMB BUN/CREATININE RATIO      6 - 22 (calc) NOT APPLICABLE   Sodium      135 - 146 mmol/L 142 Potassium      3 5 - 5 3 mmol/L 4 0   Chloride      98 - 110 mmol/L 106   CO2      20 - 32 mmol/L 28   Calcium      8 6 - 10 3 mg/dL 9 0   Total Protein      6 1 - 8 1 g/dL 6 9   Albumin      3 6 - 5 1 g/dL 4 3   Globulin      1 9 - 3 7 g/dL (calc) 2 6   Albumin/Globulin Ratio      1 0 - 2 5 (calc) 1 7   TOTAL BILIRUBIN      0 2 - 1 2 mg/dL 0 3   Alkaline Phosphatase      35 - 144 U/L 67   AST      10 - 35 U/L 15   ALT      9 - 46 U/L 26   Cholesterol      <200 mg/dL 141   HDL      > OR = 40 mg/dL 44   Triglycerides      <150 mg/dL 127   LDL Calculated      mg/dL (calc) 76   Chol HDLC Ratio      <5 0 (calc) 3 2   Non-HDL Cholesterol      <130 mg/dL (calc) 97   Hemoglobin A1C      <5 7 % of total Hgb 5 4   Hemoglobin A1c slightly increased  Lipids improved on statin  Review of Systems   HENT: Positive for postnasal drip and rhinorrhea  Residual cold symptoms   Respiratory: Negative for cough  Cardiovascular: Negative for chest pain  Gastrointestinal:        Some increased GERD  Did take his wife's omeprazole  Normally takes Pepcid   Musculoskeletal: Negative  Neurological: Negative for syncope, light-headedness and headaches  Psychiatric/Behavioral: Negative  Has had colonoscopy in the past but no EGD  Follows with urology PSA is not due as of yet    Past Medical History:   Diagnosis Date   • Calculus, ureter    • Colon polyp    • Diverticulosis    • Enlarged prostate    • GERD (gastroesophageal reflux disease)    • Hiatal hernia    • Hyperlipidemia    • Hypertension 08/2022   • Kidney stone 2019   • Multiple benign polyps of large intestine    • Pain in joint of right shoulder region    • Psoriasis     hands and scalp   • Rectal polyp    • Seasonal allergies    • Seborrheic dermatitis of scalp    • Wears glasses      Past Surgical History:   Procedure Laterality Date   • APPENDECTOMY  01/1982   • COLONOSCOPY  03/2005    [facility] [id number] [facility] 2016 - 2019[facility] - 2024[facility] • LASIK      CORNEAL   • KS CYSTO/URETERO W/LITHOTRIPSY &INDWELL STENT INSRT Left 1/15/2019    Procedure: CYSTO, URETEROSCOPY W/HOLMIUM LASER, RETROGRADE PYELOGRAM, STENT INSERTION;  Surgeon: Anais Moran MD;  Location: [facility];  Service: Urology   • SEPTOPLASTY  10/1996   • VASECTOMY  2002    SURGERY VAS DEFERENS     Family History   Problem Relation Age of Onset   • Other Mother         BACK PAIN   • Hypertension Mother    • Atrial fibrillation Mother    • Melanoma Father         MALIGNANT MELANOMA OF THE SKIN   • Prostate cancer Father    • Coronary artery disease Father    • Myocarditis Father    • Osteoporosis Brother      Social History     Socioeconomic History   • Marital status: /Civil Union     Spouse name: None   • Number of children: None   • Years of education: None   • Highest education level: None   Occupational History   • None   Tobacco Use   • Smoking status: Former     Types: Cigarettes     Quit date:      Years since quittin 1   • Smokeless tobacco: Former     Quit date:    Vaping Use   • Vaping Use: Never used   Substance and Sexual Activity   • Alcohol use: Yes     Comment: 3 x weekly   • Drug use: No   • Sexual activity: None   Other Topics Concern   • None   Social History Narrative   • None     Social Determinants of Health     Financial Resource Strain: Not on file   Food Insecurity: Not on file   Transportation Needs: Not on file   Physical Activity: Not on file   Stress: Not on file   Social Connections: Not on file   Intimate Partner Violence: Not on file   Housing Stability: Not on file     Current Outpatient Medications on File Prior to Visit   Medication Sig   • Blood Pressure Monitoring Soln KIT    • famotidine (PEPCID) 20 mg tablet TAKE 1 TABLET BY MOUTH TWICE A DAY AS NEEDED FOR HEARTBURN   • fluticasone (FLONASE) 50 mcg/act nasal spray 2 sprays into each nostril daily   • levocetirizine (XYZAL) 5 MG tablet TAKE 1 TABLET BY MOUTH EVERY DAY IN THE EVENING   • losartan (COZAAR) 25 mg tablet TAKE 1 TABLET (25 MG TOTAL) BY MOUTH DAILY  • mupirocin (BACTROBAN) 2 % ointment APPLY A SMALL AMOUNT TO THE OPENED SCRATCHED AREAS ON THE HANDS TWICE DAILY UNTIL WELL HEALED  • rosuvastatin (CRESTOR) 5 mg tablet TAKE 1 TABLET (5 MG TOTAL) BY MOUTH DAILY  • triamcinolone (KENALOG) 0 1 % cream APPLY TO HAND ECZEMA TWICE DAILY FOR TWO WEEKS ONLY AS NEEDED FOR FLARING RASH  No Known Allergies  Immunization History   Administered Date(s) Administered   • COVID-19, unspecified 03/21/2021, 04/14/2021, 12/10/2021   • INFLUENZA 11/02/2018, 10/14/2020, 10/20/2021, 10/21/2022   • Influenza, recombinant, quadrivalent,injectable, preservative free 11/02/2018, 10/11/2019, 10/14/2020, 10/20/2021, 10/21/2022   • Influenza, seasonal, injectable 01/01/2011, 10/15/2013   • Tdap 08/17/2013   • Zoster Vaccine Recombinant 09/03/2021, 01/27/2022       Objective     /78   Pulse 68   Temp (!) 97 °F (36 1 °C) (Temporal)   Resp 16   Ht 5' 9 29" (1 76 m)   Wt 88 9 kg (196 lb)   SpO2 98%   BMI 28 70 kg/m²     Physical Exam  Vitals reviewed  Constitutional:       General: He is not in acute distress  Appearance: He is well-developed  HENT:      Head: Normocephalic  Eyes:      Conjunctiva/sclera: Conjunctivae normal       Pupils: Pupils are equal, round, and reactive to light  Cardiovascular:      Rate and Rhythm: Normal rate and regular rhythm  Heart sounds: No murmur heard  Pulmonary:      Effort: Pulmonary effort is normal       Breath sounds: Normal breath sounds  Abdominal:      General: Bowel sounds are normal       Palpations: Abdomen is soft  There is no mass  Tenderness: There is no abdominal tenderness  Musculoskeletal:      Cervical back: Normal range of motion and neck supple  Skin:     General: Skin is warm and dry  Neurological:      Mental Status: He is alert and oriented to person, place, and time        Deep Tendon Reflexes: Reflexes are normal and symmetric  Psychiatric:         Behavior: Behavior normal          Thought Content:  Thought content normal        Rubio Cherry, DO

## 2023-03-01 NOTE — PROGRESS NOTES
BMI Counseling: Body mass index is 28 7 kg/m²  The BMI is above normal  Nutrition recommendations include reducing portion sizes, decreasing overall calorie intake, 3-5 servings of fruits/vegetables daily, reducing fast food intake, consuming healthier snacks, decreasing soda and/or juice intake, moderation in carbohydrate intake, increasing intake of lean protein, reducing intake of saturated fat and trans fat and reducing intake of cholesterol  Exercise recommendations include vigorous aerobic physical activity for 75 minutes/week

## 2023-05-10 DIAGNOSIS — E78.00 PURE HYPERCHOLESTEROLEMIA: ICD-10-CM

## 2023-05-10 RX ORDER — ROSUVASTATIN CALCIUM 5 MG/1
5 TABLET, COATED ORAL DAILY
Qty: 90 TABLET | Refills: 1 | Status: SHIPPED | OUTPATIENT
Start: 2023-05-10

## 2023-05-29 DIAGNOSIS — K21.00 GASTROESOPHAGEAL REFLUX DISEASE WITH ESOPHAGITIS WITHOUT HEMORRHAGE: ICD-10-CM

## 2023-05-30 RX ORDER — OMEPRAZOLE 20 MG/1
CAPSULE, DELAYED RELEASE ORAL
Qty: 90 CAPSULE | Refills: 0 | Status: SHIPPED | OUTPATIENT
Start: 2023-05-30

## 2023-06-02 NOTE — TELEPHONE ENCOUNTER
[facility] Medicine  Progress Note    Patient Name: Yaneli Steele  MRN: [id number]  Patient Class: IP- Inpatient   Admission Date: 6/1/2023  Length of Stay: 1 days  Attending Physician: Hemanth Lawson MD  Primary Care Provider: Primary Doctor No        Subjective:     Principal Problem:Atrial fibrillation with rapid ventricular response        HPI:  67 y.o. female with PAF, HTN presents with a complaint of nausea.  Acute onset, duration 2 weeks, associated with cough, congestion, and URI symptoms.  No known exacerbating or alleviating factors.  Reports adherence to home medication regimen.  Denies fever, chills, chest pain, palpitations, dizziness, syncope, diarrhea, or abdominal pain.  In the ED she was found to be in atrial fibrillation with RVR.  Rate controlled after 2 doses of IV diltiazem.  Placed in observation.      Overview/Hospital Course:  No notes on file    Interval History: admitted with afib with RVR. Difficult to control and started on IV amio and placed in ICU. Stopped amio overnight due to concerns for reaction. Currently rate controlled around 100. Off IV meds, ok to transfer to floor.    Review of Systems  Objective:     Vital Signs (Most Recent):  Temp: 97.4 °F (36.3 °C) (06/02/23 0700)  Pulse: 100 (06/02/23 0700)  Resp: (!) 29 (06/02/23 0700)  BP: (!) 142/97 (06/02/23 0700)  SpO2: 99 % (06/02/23 0700) Vital Signs (24h Range):  Temp:  [97.3 °F (36.3 °C)-97.7 °F (36.5 °C)] 97.4 °F (36.3 °C)  Pulse:  [] 100  Resp:  [9-45] 29  SpO2:  [81 %-100 %] 99 %  BP: ()/() 142/97     Weight: 100.7 kg (222 lb 0.1 oz)  Body mass index is 34.77 kg/m².    Intake/Output Summary (Last 24 hours) at 6/2/2023 0905  Last data filed at 6/2/2023 0550  Gross per 24 hour   Intake --   Output 510 ml   Net -510 ml         Physical Exam  Vitals and nursing note reviewed.   Constitutional:       Appearance: Normal appearance. She is not ill-appearing.   HENT:      Mouth/Throat:  Pt notified, order for KUb placed in Epic for September 2020 appointment       Mouth: Mucous membranes are dry.   Cardiovascular:      Rate and Rhythm: Tachycardia present. Rhythm irregular.      Pulses: Normal pulses.   Pulmonary:      Effort: Pulmonary effort is normal.   Abdominal:      General: Bowel sounds are normal. There is no distension.      Tenderness: There is no abdominal tenderness.   Musculoskeletal:         General: No swelling. Normal range of motion.   Skin:     General: Skin is warm.   Neurological:      General: No focal deficit present.      Mental Status: She is alert and oriented to person, place, and time.   Psychiatric:         Mood and Affect: Mood normal.         Thought Content: Thought content normal.           Significant Labs: All pertinent labs within the past 24 hours have been reviewed.    Significant Imaging: I have reviewed all pertinent imaging results/findings within the past 24 hours.      Assessment/Plan:      * Atrial fibrillation with rapid ventricular response  Patient with Paroxysmal (<7 days) atrial fibrillation which is uncontrolled currently with Beta Blocker and flecainide. Patient is currently in atrial fibrillation.[id number] Score: 2.  Anticoagulation indicated. Anticoagulation done with rivaroxaban.    - Did not do well on IV Amiodarone, currently better rate controlled and now in a flutter  - continue oral medicaitons, stop IV amiodarone  - If does not convert, may need cardioversion - per Cardiology     Acute on chronic congestive heart failure  Patient is identified as having Combined Systolic and Diastolic heart failure that is Acute on chronic. CHF is currently uncontrolled due to Pulmonary edema/pleural effusion on CXR. Latest ECHO performed and demonstrates- Results for orders placed during the hospital encounter of 02/07/23    Echo    Interpretation Summary  · The left ventricle is normal in size with normal systolic function.  · The estimated ejection fraction is 55%.  · Indeterminate left ventricular diastolic function.  ·  Normal right ventricular size with normal right ventricular systolic function.  · Moderate left atrial enlargement.  · Mild right atrial enlargement.  · Mild-to-moderate mitral regurgitation.  · Normal central venous pressure (3 mmHg).  · The estimated PA systolic pressure is 31 mmHg.  . Continue Beta Blocker and monitor clinical status closely. Monitor on telemetry. Patient is on CHF pathway.  Monitor strict Is&Os and daily weights.  Place on fluid restriction of 1.5 L. Continue to stress to patient importance of self efficacy and  on diet for CHF. Last BNP reviewed- and noted 630.    - continue with IV lasix for diuresis    Elevated brain natriuretic peptide (BNP) level  - most likely CHF exacerbation due to poor controlled rate.  - continue with IV lasix and work on rate/rhythm control      Essential (primary) hypertension  Well controlled, continue home medications and monitor blood pressure, adjust as needed.       VTE Risk Mitigation (From admission, onward)         Ordered     rivaroxaban tablet 20 mg  With dinner         06/01/23 1711     Reason for No Pharmacological VTE Prophylaxis  Once        Question:  Reasons:  Answer:  Already adequately anticoagulated on oral Anticoagulants    06/01/23 1711     IP VTE HIGH RISK PATIENT  Once         06/01/23 1711     Place sequential compression device  Until discontinued         06/01/23 1711                Discharge Planning   DREW:      Code Status: Full Code   Is the patient medically ready for discharge?:     Reason for patient still in hospital (select all that apply): Treatment               Critical care time spent on the evaluation and treatment of severe organ dysfunction, review of pertinent labs and imaging studies, discussions with consulting providers and discussions with patient/family: 15 minutes.      Hemanth Lawson MD  Department of Hospital Medicine   [facility] - Intensive Care

## 2023-07-18 DIAGNOSIS — J30.2 SEASONAL ALLERGIC RHINITIS, UNSPECIFIED TRIGGER: ICD-10-CM

## 2023-07-18 RX ORDER — LEVOCETIRIZINE DIHYDROCHLORIDE 5 MG/1
TABLET, FILM COATED ORAL
Qty: 90 TABLET | Refills: 3 | Status: SHIPPED | OUTPATIENT
Start: 2023-07-18

## 2023-07-25 DIAGNOSIS — E78.00 PURE HYPERCHOLESTEROLEMIA: Primary | ICD-10-CM

## 2023-07-27 LAB
ALBUMIN SERPL-MCNC: 4.5 G/DL (ref 3.6–5.1)
ALBUMIN/GLOB SERPL: 1.9 (CALC) (ref 1–2.5)
ALP SERPL-CCNC: 60 U/L (ref 35–144)
ALT SERPL-CCNC: 27 U/L (ref 9–46)
AST SERPL-CCNC: 15 U/L (ref 10–35)
BILIRUB SERPL-MCNC: 0.5 MG/DL (ref 0.2–1.2)
BUN SERPL-MCNC: 16 MG/DL (ref 7–25)
BUN/CREAT SERPL: ABNORMAL (CALC) (ref 6–22)
CALCIUM SERPL-MCNC: 9.3 MG/DL (ref 8.6–10.3)
CHLORIDE SERPL-SCNC: 105 MMOL/L (ref 98–110)
CHOLEST SERPL-MCNC: 186 MG/DL
CHOLEST/HDLC SERPL: 4 (CALC)
CO2 SERPL-SCNC: 29 MMOL/L (ref 20–32)
CREAT SERPL-MCNC: 1.05 MG/DL (ref 0.7–1.3)
GFR/BSA.PRED SERPLBLD CYS-BASED-ARV: 82 ML/MIN/1.73M2
GLOBULIN SER CALC-MCNC: 2.4 G/DL (CALC) (ref 1.9–3.7)
GLUCOSE SERPL-MCNC: 102 MG/DL (ref 65–99)
HDLC SERPL-MCNC: 47 MG/DL
LDLC SERPL CALC-MCNC: 104 MG/DL (CALC)
NONHDLC SERPL-MCNC: 139 MG/DL (CALC)
POTASSIUM SERPL-SCNC: 4.3 MMOL/L (ref 3.5–5.3)
PROT SERPL-MCNC: 6.9 G/DL (ref 6.1–8.1)
SODIUM SERPL-SCNC: 141 MMOL/L (ref 135–146)
TRIGL SERPL-MCNC: 234 MG/DL

## 2023-07-31 ENCOUNTER — OFFICE VISIT (OUTPATIENT)
Dept: FAMILY MEDICINE CLINIC | Facility: CLINIC | Age: 58
End: 2023-07-31
Payer: COMMERCIAL

## 2023-07-31 VITALS
WEIGHT: 196.4 LBS | BODY MASS INDEX: 29.09 KG/M2 | HEIGHT: 69 IN | SYSTOLIC BLOOD PRESSURE: 130 MMHG | HEART RATE: 73 BPM | OXYGEN SATURATION: 97 % | DIASTOLIC BLOOD PRESSURE: 74 MMHG | TEMPERATURE: 97 F

## 2023-07-31 DIAGNOSIS — M79.674 PAIN OF RIGHT GREAT TOE: ICD-10-CM

## 2023-07-31 DIAGNOSIS — E78.00 PURE HYPERCHOLESTEROLEMIA: Primary | ICD-10-CM

## 2023-07-31 DIAGNOSIS — I10 BENIGN HYPERTENSION: ICD-10-CM

## 2023-07-31 LAB — ECG INTERP DURING EX: NORMAL MS

## 2023-07-31 PROCEDURE — 99396 PREV VISIT EST AGE 40-64: CPT | Performed by: FAMILY MEDICINE

## 2023-07-31 PROCEDURE — 93000 ELECTROCARDIOGRAM COMPLETE: CPT | Performed by: FAMILY MEDICINE

## 2023-08-03 NOTE — PROGRESS NOTES
Assessment/Plan: Health maintenance completed. Blood pressure stable. Risk factor modification with regards to weight. Allergies stable. GI status stable. Discussed overall ASCVD risk of being 8.5%. Interested in getting CT coronary calcium score which I recommended. Await test results. Sugar 102. Rest of CMP normal.   with triglycerides 234. Aware that a CT coronary calcium score is positive would adjust cholesterol medication and have potential routine cardiology evaluation. EKG done today was normal.  Otherwise recheck every 6 months with repeat labs. Aware that I am retiring. Wish him well. Health Maintenance  Lifestyle Advice: begin progressive daily aerobic exercise program, follow a low fat, low cholesterol diet and attempt to lose weight  Diet: limited junk food  Exercise: infrequently  Dental: regular dental visits  Vision: wears glasses  Preventative Health: Male Preventative: Colon cancer screening is up to date    No problem-specific Assessment & Plan notes found for this encounter. Diagnoses and all orders for this visit:    Pure hypercholesterolemia  -     CT coronary calcium score; Future  -     Lipid panel; Future  -     Comprehensive metabolic panel; Future  -     POCT ECG    Benign hypertension  -     CT coronary calcium score; Future  -     POCT ECG    Pain of right great toe  -     Uric acid; Future                  Subjective:      Patient ID: Raymond Landeros is a 62 y.o. male. Yearly physical.        The following portions of the patient's history were reviewed and updated as appropriate: allergies, current medications, past family history, past medical history, past social history, past surgical history and problem list.    Review of Systems   Constitutional: Negative for appetite change, fatigue, fever and unexpected weight change. HENT: Negative for congestion, ear pain, postnasal drip, rhinorrhea, sinus pressure, sinus pain and sore throat.     Eyes: Negative for redness and visual disturbance. Respiratory: Negative for chest tightness and shortness of breath. Cardiovascular: Negative for chest pain, palpitations and leg swelling. Gastrointestinal: Negative for abdominal distention, abdominal pain, diarrhea and nausea. Endocrine: Negative for cold intolerance and heat intolerance. Genitourinary: Negative for dysuria and hematuria. Musculoskeletal: Negative for arthralgias, gait problem and myalgias. Skin: Negative for pallor and rash. Neurological: Negative for dizziness, tremors, weakness, light-headedness and headaches. Psychiatric/Behavioral: Negative for behavioral problems. The patient is not nervous/anxious. Objective:    /74 (BP Location: Left arm, Patient Position: Sitting, Cuff Size: Adult)   Pulse 73   Temp (!) 97 °F (36.1 °C)   Ht 5' 9" (1.753 m)   Wt 89.1 kg (196 lb 6.4 oz)   SpO2 97%   BMI 29.00 kg/m²          Physical Exam  Vitals and nursing note reviewed. Constitutional:       General: He is not in acute distress. Appearance: Normal appearance. He is not ill-appearing or diaphoretic. HENT:      Head: Normocephalic and atraumatic. Eyes:      General: No scleral icterus. Conjunctiva/sclera: Conjunctivae normal.      Pupils: Pupils are equal, round, and reactive to light. Neck:      Thyroid: No thyromegaly. Cardiovascular:      Rate and Rhythm: Normal rate and regular rhythm. Pulses:           Carotid pulses are 0 on the right side and 0 on the left side. Heart sounds: Normal heart sounds. No murmur heard. Pulmonary:      Effort: Pulmonary effort is normal. No respiratory distress. Breath sounds: Normal breath sounds. No wheezing. Abdominal:      General: There is no distension. Palpations: Abdomen is soft. Musculoskeletal:      Cervical back: Normal range of motion and neck supple. Right lower leg: No edema. Left lower leg: No edema.    Lymphadenopathy: Cervical: No cervical adenopathy. Skin:     General: Skin is warm. Coloration: Skin is not pale. Neurological:      General: No focal deficit present. Mental Status: He is alert and oriented to person, place, and time. Mental status is at baseline. Cranial Nerves: No cranial nerve deficit. Deep Tendon Reflexes: Reflexes are normal and symmetric. Psychiatric:         Mood and Affect: Mood normal.         Behavior: Behavior normal.         Thought Content: Thought content normal.         Judgment: Judgment normal.             Patient has no known allergies. Von Luong had no medications administered during this visit. Health Maintenance   Topic Date Due   • PT PLAN OF CARE  2019   • COVID-19 Vaccine (4 - Mixed Product series) 2022   • Annual Physical  2023   • Influenza Vaccine (1) 2023   • HIV Screening  2025 (Originally 1980)   • Hepatitis C Screening  [birth year]25 (Originally 1965)   • DTaP,Tdap,and Td Vaccines (2 - Td or Tdap) 2023   • Depression Screening  2024   • BMI: Followup Plan  2024   • Colorectal Cancer Screening  2024   • BMI: Adult  2024   • Pneumococcal Vaccine: Pediatrics (0 to 5 Years) and At-Risk Patients (6 to 59 Years)  Aged Out   • HIB Vaccine  Aged Out   • IPV Vaccine  Aged Out   • Hepatitis A Vaccine  Aged Out   • Meningococcal ACWY Vaccine  Aged Out   • HPV Vaccine  Aged Out      Social History     Socioeconomic History   • Marital status: /Civil Union     Spouse name: Not on file   • Number of children: Not on file   • Years of education: Not on file   • Highest education level: Not on file   Occupational History   • Not on file   Tobacco Use   • Smoking status: Former     Types: Cigarettes     Quit date:      Years since quittin.6   • Smokeless tobacco: Former     Quit date:    Vaping Use   • Vaping Use: Never used   Substance and Sexual Activity   • Alcohol use:  Yes Comment: 3 x weekly   • Drug use: No   • Sexual activity: Not on file   Other Topics Concern   • Not on file   Social History Narrative   • Not on file     Social Determinants of Health     Financial Resource Strain: Not on file   Food Insecurity: Not on file   Transportation Needs: Not on file   Physical Activity: Not on file   Stress: Not on file   Social Connections: Not on file   Intimate Partner Violence: Not on file   Housing Stability: Not on file      Family History   Problem Relation Age of Onset   • Other Mother         BACK PAIN   • Hypertension Mother    • Atrial fibrillation Mother    • Melanoma Father         MALIGNANT MELANOMA OF THE SKIN   • Prostate cancer Father    • Coronary artery disease Father    • Myocarditis Father    • Osteoporosis Brother       Past Medical History:   Diagnosis Date   • Calculus, ureter    • Colon polyp    • Diverticulosis    • Enlarged prostate    • GERD (gastroesophageal reflux disease)    • Hiatal hernia    • Hyperlipidemia    • Hypertension 08/2022   • Kidney stone 2019   • Multiple benign polyps of large intestine    • Pain in joint of right shoulder region    • Psoriasis     hands and scalp   • Rectal polyp    • Seasonal allergies    • Seborrheic dermatitis of scalp    • Wears glasses       has a past surgical history that includes Appendectomy (01/1982); Colonoscopy (03/2005); LASIK; Septoplasty (10/1996); Vasectomy (11/2002); and pr cysto/uretero w/lithotripsy &indwell stent insrt (Left, 1/15/2019).    Patient Active Problem List    Diagnosis Date Noted   • Elevated fasting blood sugar 01/25/2022   • Basal cell carcinoma of skin of other parts of face 10/11/2019   • Neoplasm of uncertain behavior of skin 10/11/2019   • Rosacea 10/11/2019   • Shoulder joint pain 04/25/2019   • Sprain of rotator cuff capsule 04/25/2019   • Calculus of ureter 01/14/2019   • Family history of prostate cancer 01/14/2019   • Chronic right shoulder pain 11/26/2018   • Health care maintenance 02/08/2018   • Psoriasis 09/19/2016   • BPH with urinary obstruction 12/19/2014   • Esophagitis, reflux 12/19/2014   • Hyperlipidemia 06/13/2012   • History of skin cancer 01/31/2012       Current Outpatient Medications:   •  Blood Pressure Monitoring Soln KIT, , Disp: , Rfl:   •  famotidine (PEPCID) 20 mg tablet, TAKE 1 TABLET BY MOUTH TWICE A DAY AS NEEDED FOR HEARTBURN, Disp: 180 tablet, Rfl: 3  •  fluticasone (FLONASE) 50 mcg/act nasal spray, SPRAY 2 SPRAYS INTO EACH NOSTRIL EVERY DAY, Disp: 48 mL, Rfl: 1  •  levocetirizine (XYZAL) 5 MG tablet, TAKE 1 TABLET BY MOUTH EVERY DAY IN THE EVENING, Disp: 90 tablet, Rfl: 3  •  losartan (COZAAR) 25 mg tablet, TAKE 1 TABLET (25 MG TOTAL) BY MOUTH DAILY. , Disp: 90 tablet, Rfl: 1  •  mupirocin (BACTROBAN) 2 % ointment, APPLY A SMALL AMOUNT TO THE OPENED SCRATCHED AREAS ON THE HANDS TWICE DAILY UNTIL WELL HEALED., Disp: , Rfl:   •  omeprazole (PriLOSEC) 20 mg delayed release capsule, TAKE 1 CAPSULE BY MOUTH DAILY BEFORE BREAKFAST., Disp: 90 capsule, Rfl: 0  •  rosuvastatin (CRESTOR) 5 mg tablet, TAKE 1 TABLET (5 MG TOTAL) BY MOUTH DAILY. , Disp: 90 tablet, Rfl: 1  •  triamcinolone (KENALOG) 0.1 % cream, APPLY TO HAND ECZEMA TWICE DAILY FOR TWO WEEKS ONLY AS NEEDED FOR FLARING RASH., Disp: , Rfl:

## 2023-08-08 ENCOUNTER — HOSPITAL ENCOUNTER (OUTPATIENT)
Dept: CT IMAGING | Facility: HOSPITAL | Age: 58
Discharge: HOME | End: 2023-08-08
Payer: COMMERCIAL

## 2023-08-08 DIAGNOSIS — I10 BENIGN HYPERTENSION: ICD-10-CM

## 2023-08-08 DIAGNOSIS — E78.00 PURE HYPERCHOLESTEROLEMIA: ICD-10-CM

## 2023-08-08 PROCEDURE — G1004 CDSM NDSC: HCPCS

## 2023-08-08 PROCEDURE — 75571 CT HRT W/O DYE W/CA TEST: CPT

## 2023-08-10 DIAGNOSIS — K21.00 ESOPHAGITIS, REFLUX: ICD-10-CM

## 2023-08-10 RX ORDER — FAMOTIDINE 20 MG/1
TABLET, FILM COATED ORAL
Qty: 180 TABLET | Refills: 3 | Status: SHIPPED | OUTPATIENT
Start: 2023-08-10

## 2023-08-18 ENCOUNTER — TELEPHONE (OUTPATIENT)
Dept: FAMILY MEDICINE CLINIC | Facility: CLINIC | Age: 58
End: 2023-08-18

## 2023-08-19 NOTE — TELEPHONE ENCOUNTER
Tell the patient that his CT coronary calcium score came back very low at 54 which is excellent. We will discuss this on his next visit. Recommend to encourage low saturated fat and cholesterol friendly diet.

## 2023-08-25 DIAGNOSIS — I10 BENIGN HYPERTENSION: ICD-10-CM

## 2023-08-25 RX ORDER — LOSARTAN POTASSIUM 25 MG/1
25 TABLET ORAL DAILY
Qty: 90 TABLET | Refills: 1 | Status: SHIPPED | OUTPATIENT
Start: 2023-08-25

## 2023-08-26 DIAGNOSIS — K21.00 GASTROESOPHAGEAL REFLUX DISEASE WITH ESOPHAGITIS WITHOUT HEMORRHAGE: ICD-10-CM

## 2023-08-28 RX ORDER — OMEPRAZOLE 20 MG/1
20 CAPSULE, DELAYED RELEASE ORAL
Qty: 90 CAPSULE | Refills: 0 | Status: SHIPPED | OUTPATIENT
Start: 2023-08-28

## 2023-10-13 ENCOUNTER — IMMUNIZATIONS (OUTPATIENT)
Dept: FAMILY MEDICINE CLINIC | Facility: CLINIC | Age: 58
End: 2023-10-13
Payer: COMMERCIAL

## 2023-10-13 DIAGNOSIS — Z23 ENCOUNTER FOR IMMUNIZATION: Primary | ICD-10-CM

## 2023-10-13 PROCEDURE — 90471 IMMUNIZATION ADMIN: CPT

## 2023-10-13 PROCEDURE — 90686 IIV4 VACC NO PRSV 0.5 ML IM: CPT

## 2023-10-19 DIAGNOSIS — J30.9 ALLERGIC RHINITIS, UNSPECIFIED SEASONALITY, UNSPECIFIED TRIGGER: ICD-10-CM

## 2023-10-19 RX ORDER — FLUTICASONE PROPIONATE 50 MCG
SPRAY, SUSPENSION (ML) NASAL
Qty: 48 ML | Refills: 1 | Status: SHIPPED | OUTPATIENT
Start: 2023-10-19

## 2023-10-23 DIAGNOSIS — E78.00 PURE HYPERCHOLESTEROLEMIA: ICD-10-CM

## 2023-10-23 RX ORDER — ROSUVASTATIN CALCIUM 5 MG/1
5 TABLET, COATED ORAL DAILY
Qty: 90 TABLET | Refills: 1 | Status: SHIPPED | OUTPATIENT
Start: 2023-10-23

## 2023-11-15 ENCOUNTER — OFFICE VISIT (OUTPATIENT)
Dept: UROLOGY | Facility: MEDICAL CENTER | Age: 58
End: 2023-11-15
Payer: COMMERCIAL

## 2023-11-15 VITALS
SYSTOLIC BLOOD PRESSURE: 118 MMHG | HEART RATE: 72 BPM | OXYGEN SATURATION: 95 % | DIASTOLIC BLOOD PRESSURE: 82 MMHG | BODY MASS INDEX: 28.73 KG/M2 | HEIGHT: 69 IN | WEIGHT: 194 LBS

## 2023-11-15 DIAGNOSIS — N20.0 CALCULUS OF KIDNEY: ICD-10-CM

## 2023-11-15 DIAGNOSIS — Z80.42 FAMILY HISTORY OF PROSTATE CANCER IN FATHER: Primary | ICD-10-CM

## 2023-11-15 DIAGNOSIS — N40.0 BPH WITHOUT OBSTRUCTION/LOWER URINARY TRACT SYMPTOMS: ICD-10-CM

## 2023-11-15 LAB
SL AMB  POCT GLUCOSE, UA: ABNORMAL
SL AMB LEUKOCYTE ESTERASE,UA: ABNORMAL
SL AMB POCT BILIRUBIN,UA: ABNORMAL
SL AMB POCT BLOOD,UA: ABNORMAL
SL AMB POCT CLARITY,UA: CLEAR
SL AMB POCT COLOR,UA: YELLOW
SL AMB POCT KETONES,UA: ABNORMAL
SL AMB POCT NITRITE,UA: ABNORMAL
SL AMB POCT PH,UA: 6.5
SL AMB POCT SPECIFIC GRAVITY,UA: 1.01
SL AMB POCT URINE PROTEIN: ABNORMAL
SL AMB POCT UROBILINOGEN: 0.2

## 2023-11-15 PROCEDURE — 99214 OFFICE O/P EST MOD 30 MIN: CPT | Performed by: UROLOGY

## 2023-11-15 PROCEDURE — 81003 URINALYSIS AUTO W/O SCOPE: CPT | Performed by: UROLOGY

## 2023-11-15 NOTE — LETTER
November 15, 2378     Nilson Barajas DO  [address] [facility]    Patient: Cristel Gaspar   YOB: 1965   Date of Visit: 11/15/2023       Dear Dr. Jarrod Morales: Thank you for referring Deyanira Palomares to me for evaluation. Below are my notes for this consultation. If you have questions, please do not hesitate to call me. I look forward to following your patient along with you. Sincerely,        Romeo Tracey MD        CC: No Recipients    Romeo Tracey MD  11/15/2023 10:50 AM  Incomplete  Assessment/Plan:  #1. Renal calculus upper pole, nonobstructive, continue fluid hydration low-sodium diet. CT stone study if symptomatic. 2.  BPH without obstructive symptoms-low AUA symptom score-no intervention    3. Family history of prostate cancer/prostate cancer screening-PSA is 2.3 with a normal PSA velocity. We did discuss the fact that under age 61 PSA normal can be interpreted as 2.5 and below and in high risk patients such as this patient 2.0 and below. We will repeat PSA and if it continues to climb we will consider further intervention i.e. multiparametric MRI. No problem-specific Assessment & Plan notes found for this encounter. Diagnoses and all orders for this visit:    BPH without obstruction/lower urinary tract symptoms  -     POCT urine dip auto non-scope          Subjective:      Patient ID: Cristel Gaspar is a 62 y.o. male. HPI  66-year-old male with a history of renal stones presents for digital rectal examination discussion of PSA. He has a family history of prostate cancer in both his dad and his brother. Patient is voiding adequately with a low AUA symptom score denying significant obstructive or irritative symptoms. He denies gross hematuria dysuria.   The following portions of the patient's history were reviewed and updated as appropriate: allergies, current medications, past family history, past medical history, past social history, past surgical history, and problem list.    Review of Systems   All other systems reviewed and are negative. Objective:      /82 (BP Location: Right arm, Patient Position: Sitting, Cuff Size: Standard)   Pulse 72   Ht 5' 9" (1.753 m)   Wt 88 kg (194 lb)   SpO2 95%   BMI 28.65 kg/m²          Physical Exam  Vitals reviewed. Constitutional:       General: He is not in acute distress. Appearance: Normal appearance. He is not ill-appearing, toxic-appearing or diaphoretic. HENT:      Head: Normocephalic and atraumatic. Nose: Nose normal.      Mouth/Throat:      Mouth: Mucous membranes are moist.   Eyes:      Extraocular Movements: Extraocular movements intact. Pulmonary:      Effort: Pulmonary effort is normal. No respiratory distress. Genitourinary:     Penis: Normal.       Testes: Normal.      Prostate: Normal.      Rectum: Normal.   Musculoskeletal:         General: Normal range of motion. Neurological:      Mental Status: He is alert and oriented to person, place, and time. Psychiatric:         Mood and Affect: Mood normal.         Behavior: Behavior normal.         Thought Content:  Thought content normal.         Judgment: Judgment normal.

## 2023-11-15 NOTE — PROGRESS NOTES
Assessment/Plan:  #1. Renal calculus upper pole, nonobstructive, continue fluid hydration low-sodium diet. CT stone study if symptomatic. 2.  BPH without obstructive symptoms-low AUA symptom score-no intervention    3. Family history of prostate cancer/prostate cancer screening-PSA is 2.3 with a normal PSA velocity. We did discuss the fact that under age 61 PSA normal can be interpreted as 2.5 and below and in high risk patients such as this patient 2.0 and below. We will repeat PSA and if it continues to climb we will consider further intervention i.e. multiparametric MRI. No problem-specific Assessment & Plan notes found for this encounter. Diagnoses and all orders for this visit:    BPH without obstruction/lower urinary tract symptoms  -     POCT urine dip auto non-scope          Subjective:      Patient ID: Ana Meade is a 62 y.o. male. HPI  20-year-old male with a history of renal stones presents for digital rectal examination discussion of PSA. He has a family history of prostate cancer in both his dad and his brother. Patient is voiding adequately with a low AUA symptom score denying significant obstructive or irritative symptoms. He denies gross hematuria dysuria. The following portions of the patient's history were reviewed and updated as appropriate: allergies, current medications, past family history, past medical history, past social history, past surgical history, and problem list.    Review of Systems   All other systems reviewed and are negative. Objective:      /82 (BP Location: Right arm, Patient Position: Sitting, Cuff Size: Standard)   Pulse 72   Ht 5' 9" (1.753 m)   Wt 88 kg (194 lb)   SpO2 95%   BMI 28.65 kg/m²          Physical Exam  Vitals reviewed. Constitutional:       General: He is not in acute distress. Appearance: Normal appearance. He is not ill-appearing, toxic-appearing or diaphoretic.    HENT:      Head: Normocephalic and atraumatic. Nose: Nose normal.      Mouth/Throat:      Mouth: Mucous membranes are moist.   Eyes:      Extraocular Movements: Extraocular movements intact. Pulmonary:      Effort: Pulmonary effort is normal. No respiratory distress. Genitourinary:     Penis: Normal.       Testes: Normal.      Prostate: Normal.      Rectum: Normal.   Musculoskeletal:         General: Normal range of motion. Neurological:      Mental Status: He is alert and oriented to person, place, and time. Psychiatric:         Mood and Affect: Mood normal.         Behavior: Behavior normal.         Thought Content:  Thought content normal.         Judgment: Judgment normal.

## 2023-11-22 DIAGNOSIS — K21.00 GASTROESOPHAGEAL REFLUX DISEASE WITH ESOPHAGITIS WITHOUT HEMORRHAGE: ICD-10-CM

## 2023-11-22 RX ORDER — OMEPRAZOLE 20 MG/1
20 CAPSULE, DELAYED RELEASE ORAL
Qty: 90 CAPSULE | Refills: 0 | Status: SHIPPED | OUTPATIENT
Start: 2023-11-22

## 2024-01-23 LAB
ALBUMIN SERPL-MCNC: 4.4 G/DL (ref 3.6–5.1)
ALBUMIN/GLOB SERPL: 1.8 (CALC) (ref 1–2.5)
ALP SERPL-CCNC: 63 U/L (ref 35–144)
ALT SERPL-CCNC: 23 U/L (ref 9–46)
AST SERPL-CCNC: 15 U/L (ref 10–35)
BILIRUB SERPL-MCNC: 0.3 MG/DL (ref 0.2–1.2)
BUN SERPL-MCNC: 24 MG/DL (ref 7–25)
BUN/CREAT SERPL: ABNORMAL (CALC) (ref 6–22)
CALCIUM SERPL-MCNC: 9.5 MG/DL (ref 8.6–10.3)
CHLORIDE SERPL-SCNC: 106 MMOL/L (ref 98–110)
CHOLEST SERPL-MCNC: 197 MG/DL
CHOLEST/HDLC SERPL: 3.9 (CALC)
CO2 SERPL-SCNC: 25 MMOL/L (ref 20–32)
CREAT SERPL-MCNC: 1.14 MG/DL (ref 0.7–1.3)
GFR/BSA.PRED SERPLBLD CYS-BASED-ARV: 75 ML/MIN/1.73M2
GLOBULIN SER CALC-MCNC: 2.5 G/DL (CALC) (ref 1.9–3.7)
GLUCOSE SERPL-MCNC: 116 MG/DL (ref 65–99)
HDLC SERPL-MCNC: 50 MG/DL
LDLC SERPL CALC-MCNC: 105 MG/DL (CALC)
NONHDLC SERPL-MCNC: 147 MG/DL (CALC)
POTASSIUM SERPL-SCNC: 4.2 MMOL/L (ref 3.5–5.3)
PROT SERPL-MCNC: 6.9 G/DL (ref 6.1–8.1)
SODIUM SERPL-SCNC: 138 MMOL/L (ref 135–146)
TRIGL SERPL-MCNC: 317 MG/DL
URATE SERPL-MCNC: 5.4 MG/DL (ref 4–8)

## 2024-01-31 ENCOUNTER — OFFICE VISIT (OUTPATIENT)
Dept: FAMILY MEDICINE CLINIC | Facility: CLINIC | Age: 59
End: 2024-01-31
Payer: COMMERCIAL

## 2024-01-31 VITALS
OXYGEN SATURATION: 99 % | SYSTOLIC BLOOD PRESSURE: 132 MMHG | BODY MASS INDEX: 29.77 KG/M2 | DIASTOLIC BLOOD PRESSURE: 84 MMHG | HEIGHT: 69 IN | WEIGHT: 201 LBS | TEMPERATURE: 96.7 F | HEART RATE: 68 BPM

## 2024-01-31 DIAGNOSIS — E78.00 PURE HYPERCHOLESTEROLEMIA: ICD-10-CM

## 2024-01-31 DIAGNOSIS — Z23 ENCOUNTER FOR IMMUNIZATION: ICD-10-CM

## 2024-01-31 DIAGNOSIS — Z23 NEED FOR COVID-19 VACCINE: ICD-10-CM

## 2024-01-31 DIAGNOSIS — R73.01 ELEVATED FASTING BLOOD SUGAR: ICD-10-CM

## 2024-01-31 DIAGNOSIS — I10 BENIGN HYPERTENSION: Primary | ICD-10-CM

## 2024-01-31 DIAGNOSIS — E78.1 HYPERTRIGLYCERIDEMIA: ICD-10-CM

## 2024-01-31 PROCEDURE — 91320 SARSCV2 VAC 30MCG TRS-SUC IM: CPT

## 2024-01-31 PROCEDURE — 90480 ADMN SARSCOV2 VAC 1/ONLY CMP: CPT

## 2024-01-31 PROCEDURE — 99213 OFFICE O/P EST LOW 20 MIN: CPT | Performed by: FAMILY MEDICINE

## 2024-01-31 PROCEDURE — 3725F SCREEN DEPRESSION PERFORMED: CPT | Performed by: FAMILY MEDICINE

## 2024-01-31 RX ORDER — OMEGA-3-ACID ETHYL ESTERS 1 G/1
2 CAPSULE, LIQUID FILLED ORAL 2 TIMES DAILY
Qty: 360 CAPSULE | Refills: 0 | Status: SHIPPED | OUTPATIENT
Start: 2024-01-31

## 2024-01-31 NOTE — PROGRESS NOTES
Name: Neo Bruce      : 1965      MRN: [id number]  Encounter Provider: Liza Braga DO  Encounter Date: 2024   Encounter department: [facility] PRIMARY CARE    Assessment & Plan     1. Benign hypertension    2. Hypertriglyceridemia  -     omega-3-acid ethyl esters (LOVAZA) 1 g capsule; Take 2 capsules (2 g total) by mouth 2 (two) times a day    3. Elevated fasting blood sugar  -     Hemoglobin A1C; Future; Expected date: 2024    4. Need for COVID-19 vaccine  -     COVID-19 Pfizer mRNA vaccine 12 yr and older (GRAY cap vial or pre-filled syringe)    5. Encounter for immunization  -     COVID-19 Pfizer mRNA vaccine 12 yr and older (GRAY cap vial or pre-filled syringe)    6. Pure hypercholesterolemia  -     Lipid panel; Future; Expected date: 2024       The patient is asked to make an attempt to improve diet and exercise patterns to aid in medical management of this problem.  Will add Lovaza today for triglyceride and HDL improvement.  Recheck lab work in 3 months.    Subjective     58-year-old female here for blood pressure checkup and review of labs.  Cholesterol and LDL are at goal patient still with elevated triglycerides and fasting blood sugar.  Previous hemoglobin A1c's have been normal.  Patient is compliant with his medications including losartan and rosuvastatin.  He is finding it difficult to manage his weight and get motivated to exercise.  We discussed at length calorie counting and portion control.      Review of Systems   Respiratory:  Negative for cough and shortness of breath.    Gastrointestinal: Negative.         PPI and H2 helping   All other systems reviewed and are negative.    Patient is interested in COVID-vaccine today.  Past Medical History:   Diagnosis Date    Calculus, ureter     Colon polyp     Diverticulosis     Enlarged prostate     GERD (gastroesophageal reflux disease)     Hiatal hernia     Hyperlipidemia     Hypertension 2022    Kidney  stone 2019    Multiple benign polyps of large intestine     Pain in joint of right shoulder region     Psoriasis     hands and scalp    Rectal polyp     Seasonal allergies     Seborrheic dermatitis of scalp     Wears glasses      Past Surgical History:   Procedure Laterality Date    APPENDECTOMY  1982    COLONOSCOPY  2005    FIBEROPTIC; [id number] - (STASIK)  - ([place]) - ([facility])    LASIK      CORNEAL    SC CYSTO/URETERO W/LITHOTRIPSY &INDWELL STENT INSRT Left 1/15/2019    Procedure: CYSTO, URETEROSCOPY W/HOLMIUM LASER, RETROGRADE PYELOGRAM, STENT INSERTION;  Surgeon: Jose Prasad MD;  Location: [facility];  Service: Urology    SEPTOPLASTY  10/1996    VASECTOMY  2002    SURGERY VAS DEFERENS     Family History   Problem Relation Age of Onset    Melanoma Father         MALIGNANT MELANOMA OF THE SKIN    Prostate cancer Father     Coronary artery disease Father     Myocarditis Father     Other Mother         BACK PAIN    Hypertension Mother     Atrial fibrillation Mother     Osteoporosis Brother         brother diagnosed with elevated PSA     Social History     Socioeconomic History    Marital status: /Civil Union     Spouse name: None    Number of children: None    Years of education: None    Highest education level: None   Occupational History    None   Tobacco Use    Smoking status: Former     Current packs/day: 0.00     Types: Cigarettes     Quit date:      Years since quittin.1    Smokeless tobacco: Former     Quit date:    Vaping Use    Vaping status: Never Used   Substance and Sexual Activity    Alcohol use: Yes     Comment: socially    Drug use: No    Sexual activity: Yes   Other Topics Concern    None   Social History Narrative    None     Social Determinants of Health     Financial Resource Strain: Not on file   Food Insecurity: Not on file   Transportation Needs: Not on file   Physical Activity: Not on file   Stress: Not on file   Social Connections: Not on file  "  Intimate Partner Violence: Not on file   Housing Stability: Not on file     Current Outpatient Medications on File Prior to Visit   Medication Sig    Blood Pressure Monitoring Soln KIT     famotidine (PEPCID) 20 mg tablet TAKE 1 TABLET BY MOUTH TWICE A DAY AS NEEDED FOR HEARTBURN    fluticasone (FLONASE) 50 mcg/act nasal spray SPRAY 2 SPRAYS INTO EACH NOSTRIL EVERY DAY    levocetirizine (XYZAL) 5 MG tablet TAKE 1 TABLET BY MOUTH EVERY DAY IN THE EVENING    losartan (COZAAR) 25 mg tablet TAKE 1 TABLET (25 MG TOTAL) BY MOUTH DAILY.    mupirocin (BACTROBAN) 2 % ointment APPLY A SMALL AMOUNT TO THE OPENED SCRATCHED AREAS ON THE HANDS TWICE DAILY UNTIL WELL HEALED.    omeprazole (PriLOSEC) 20 mg delayed release capsule TAKE 1 CAPSULE BY MOUTH EVERY DAY BEFORE BREAKFAST    rosuvastatin (CRESTOR) 5 mg tablet TAKE 1 TABLET (5 MG TOTAL) BY MOUTH DAILY.    triamcinolone (KENALOG) 0.1 % cream APPLY TO HAND ECZEMA TWICE DAILY FOR TWO WEEKS ONLY AS NEEDED FOR FLARING RASH.     Allergies   Allergen Reactions    No Known Allergies Other (See Comments)     Immunization History   Administered Date(s) Administered    COVID-19 Pfizer mRNA vacc PF rocco-sucrose 12 yr and older (Comirnaty) 01/31/2024    COVID-19, unspecified 03/21/2021, 04/14/2021, 12/10/2021    INFLUENZA 11/02/2018, 10/14/2020, 10/20/2021, 10/21/2022    Influenza, injectable, quadrivalent, preservative free 0.5 mL 10/13/2023    Influenza, recombinant, quadrivalent,injectable, preservative free 11/02/2018, 10/11/2019, 10/14/2020, 10/20/2021, 10/21/2022    Influenza, seasonal, injectable 01/01/2011, 10/15/2013    Tdap 08/17/2013    Zoster Vaccine Recombinant 09/03/2021, 01/27/2022       Objective     /84   Pulse 68   Temp (!) 96.7 °F (35.9 °C) (Temporal)   Ht 5' 9\" (1.753 m)   Wt 91.2 kg (201 lb)   SpO2 99%   BMI 29.68 kg/m²     Physical Exam  Vitals reviewed.   Constitutional:       Appearance: Normal appearance.   Cardiovascular:      Rate and Rhythm: " Regular rhythm.   Pulmonary:      Effort: Pulmonary effort is normal.      Breath sounds: Normal breath sounds.   Neurological:      Mental Status: He is alert and oriented to person, place, and time.   Psychiatric:         Mood and Affect: Mood normal.       Liza Braga DO

## 2024-02-17 DIAGNOSIS — K21.00 GASTROESOPHAGEAL REFLUX DISEASE WITH ESOPHAGITIS WITHOUT HEMORRHAGE: ICD-10-CM

## 2024-02-17 RX ORDER — OMEPRAZOLE 20 MG/1
20 CAPSULE, DELAYED RELEASE ORAL
Qty: 90 CAPSULE | Refills: 0 | Status: SHIPPED | OUTPATIENT
Start: 2024-02-17

## 2024-02-20 DIAGNOSIS — I10 BENIGN HYPERTENSION: ICD-10-CM

## 2024-02-20 RX ORDER — LOSARTAN POTASSIUM 25 MG/1
25 TABLET ORAL DAILY
Qty: 90 TABLET | Refills: 1 | Status: SHIPPED | OUTPATIENT
Start: 2024-02-20

## 2024-02-21 PROBLEM — Z00.00 HEALTH CARE MAINTENANCE: Status: RESOLVED | Noted: 2018-02-08 | Resolved: 2024-02-21

## 2024-03-23 DIAGNOSIS — E78.00 PURE HYPERCHOLESTEROLEMIA: ICD-10-CM

## 2024-03-25 RX ORDER — ROSUVASTATIN CALCIUM 5 MG/1
5 TABLET, COATED ORAL DAILY
Qty: 90 TABLET | Refills: 1 | Status: SHIPPED | OUTPATIENT
Start: 2024-03-25

## 2024-04-23 LAB
CHOLEST SERPL-MCNC: 163 MG/DL
CHOLEST/HDLC SERPL: 3.1 (CALC)
HBA1C MFR BLD: 6.2 % OF TOTAL HGB
HDLC SERPL-MCNC: 52 MG/DL
LDLC SERPL CALC-MCNC: 88 MG/DL (CALC)
NONHDLC SERPL-MCNC: 111 MG/DL (CALC)
TRIGL SERPL-MCNC: 136 MG/DL

## 2024-04-24 DIAGNOSIS — E78.1 HYPERTRIGLYCERIDEMIA: ICD-10-CM

## 2024-04-24 RX ORDER — OMEGA-3-ACID ETHYL ESTERS 1 G/1
2 CAPSULE, LIQUID FILLED ORAL 2 TIMES DAILY
Qty: 360 CAPSULE | Refills: 1 | Status: SHIPPED | OUTPATIENT
Start: 2024-04-24

## 2024-05-19 DIAGNOSIS — K21.00 GASTROESOPHAGEAL REFLUX DISEASE WITH ESOPHAGITIS WITHOUT HEMORRHAGE: ICD-10-CM

## 2024-05-20 RX ORDER — OMEPRAZOLE 20 MG/1
20 CAPSULE, DELAYED RELEASE ORAL
Qty: 90 CAPSULE | Refills: 0 | Status: SHIPPED | OUTPATIENT
Start: 2024-05-20

## 2024-07-12 DIAGNOSIS — E78.00 PURE HYPERCHOLESTEROLEMIA: Primary | ICD-10-CM

## 2024-07-12 DIAGNOSIS — I10 BENIGN HYPERTENSION: ICD-10-CM

## 2024-07-12 DIAGNOSIS — R73.03 PREDIABETES: ICD-10-CM

## 2024-07-16 DIAGNOSIS — J30.2 SEASONAL ALLERGIC RHINITIS, UNSPECIFIED TRIGGER: ICD-10-CM

## 2024-07-16 RX ORDER — LEVOCETIRIZINE DIHYDROCHLORIDE 5 MG/1
TABLET, FILM COATED ORAL
Qty: 90 TABLET | Refills: 1 | Status: SHIPPED | OUTPATIENT
Start: 2024-07-16

## 2024-07-23 LAB
ALBUMIN SERPL-MCNC: 4.4 G/DL (ref 3.6–5.1)
ALBUMIN/GLOB SERPL: 1.8 (CALC) (ref 1–2.5)
ALP SERPL-CCNC: 50 U/L (ref 35–144)
ALT SERPL-CCNC: 25 U/L (ref 9–46)
AST SERPL-CCNC: 28 U/L (ref 10–35)
BILIRUB SERPL-MCNC: 0.8 MG/DL (ref 0.2–1.2)
BUN SERPL-MCNC: 21 MG/DL (ref 7–25)
BUN/CREAT SERPL: NORMAL (CALC) (ref 6–22)
CALCIUM SERPL-MCNC: 8.9 MG/DL (ref 8.6–10.3)
CHLORIDE SERPL-SCNC: 107 MMOL/L (ref 98–110)
CHOLEST SERPL-MCNC: 154 MG/DL
CHOLEST/HDLC SERPL: 2.8 (CALC)
CO2 SERPL-SCNC: 27 MMOL/L (ref 20–32)
CREAT SERPL-MCNC: 1.01 MG/DL (ref 0.7–1.3)
GFR/BSA.PRED SERPLBLD CYS-BASED-ARV: 86 ML/MIN/1.73M2
GLOBULIN SER CALC-MCNC: 2.4 G/DL (CALC) (ref 1.9–3.7)
GLUCOSE SERPL-MCNC: 98 MG/DL (ref 65–99)
HBA1C MFR BLD: 6.1 % OF TOTAL HGB
HDLC SERPL-MCNC: 55 MG/DL
LDLC SERPL CALC-MCNC: 81 MG/DL (CALC)
NONHDLC SERPL-MCNC: 99 MG/DL (CALC)
POTASSIUM SERPL-SCNC: 4.2 MMOL/L (ref 3.5–5.3)
PROT SERPL-MCNC: 6.8 G/DL (ref 6.1–8.1)
SODIUM SERPL-SCNC: 140 MMOL/L (ref 135–146)
TRIGL SERPL-MCNC: 92 MG/DL

## 2024-07-31 ENCOUNTER — OFFICE VISIT (OUTPATIENT)
Dept: FAMILY MEDICINE CLINIC | Facility: CLINIC | Age: 59
End: 2024-07-31
Payer: COMMERCIAL

## 2024-07-31 ENCOUNTER — TELEPHONE (OUTPATIENT)
Dept: ADMINISTRATIVE | Facility: OTHER | Age: 59
End: 2024-07-31

## 2024-07-31 VITALS
HEART RATE: 67 BPM | WEIGHT: 196.6 LBS | DIASTOLIC BLOOD PRESSURE: 60 MMHG | HEIGHT: 69 IN | SYSTOLIC BLOOD PRESSURE: 122 MMHG | OXYGEN SATURATION: 97 % | BODY MASS INDEX: 29.12 KG/M2 | TEMPERATURE: 97.3 F

## 2024-07-31 DIAGNOSIS — Z00.00 ANNUAL PHYSICAL EXAM: Primary | ICD-10-CM

## 2024-07-31 DIAGNOSIS — I10 BENIGN HYPERTENSION: ICD-10-CM

## 2024-07-31 DIAGNOSIS — N52.9 ERECTILE DYSFUNCTION, UNSPECIFIED ERECTILE DYSFUNCTION TYPE: ICD-10-CM

## 2024-07-31 DIAGNOSIS — E66.3 OVERWEIGHT (BMI 25.0-29.9): ICD-10-CM

## 2024-07-31 DIAGNOSIS — R73.03 PREDIABETES: ICD-10-CM

## 2024-07-31 DIAGNOSIS — E78.00 PURE HYPERCHOLESTEROLEMIA: ICD-10-CM

## 2024-07-31 PROCEDURE — 99213 OFFICE O/P EST LOW 20 MIN: CPT | Performed by: FAMILY MEDICINE

## 2024-07-31 PROCEDURE — 99396 PREV VISIT EST AGE 40-64: CPT | Performed by: FAMILY MEDICINE

## 2024-07-31 PROCEDURE — 3725F SCREEN DEPRESSION PERFORMED: CPT | Performed by: FAMILY MEDICINE

## 2024-07-31 RX ORDER — VARDENAFIL HYDROCHLORIDE 10 MG/1
10 TABLET ORAL DAILY PRN
Start: 2024-07-31

## 2024-07-31 RX ORDER — SEMAGLUTIDE 0.25 MG/.5ML
INJECTION, SOLUTION SUBCUTANEOUS
Qty: 2 ML | Refills: 0 | Status: SHIPPED | OUTPATIENT
Start: 2024-07-31

## 2024-07-31 NOTE — TELEPHONE ENCOUNTER
----- Message from Liza Braga DO sent at 7/31/2024  9:27 AM EDT -----  Regarding: care gap request  07/31/24 9:27 AM    Hello, our patient attached above has had CRC: Colonoscopy completed/performed. Please assist in updating the patient chart by pulling the Care Everywhere (CE) document. The date of service is 7/2024.     Thank you,  Liza Braga [facility] CARE

## 2024-07-31 NOTE — PROGRESS NOTES
Adult Annual Physical  Name: Neo Bruce      : 1965      MRN: [id number]  Encounter Provider: Liza Braga DO  Encounter Date: 2024   Encounter department: [facility] PRIMARY CARE    Assessment & Plan   1. Annual physical exam  2. Overweight (BMI 25.0-29.9)  -     Semaglutide-Weight Management (Wegovy) 0.25 MG/0.5ML; Inject 0.25 mg under the skin weekly  3. Pure hypercholesterolemia  -     Semaglutide-Weight Management (Wegovy) 0.25 MG/0.5ML; Inject 0.25 mg under the skin weekly  -     Hemoglobin A1C; Future; Expected date: 2025  -     Lipid Panel with Direct LDL reflex; Future; Expected date: 2025  -     Lipid Panel with Direct LDL reflex  -     Comprehensive metabolic panel; Future; Expected date: 2025  -     Comprehensive metabolic panel  4. Prediabetes  -     Semaglutide-Weight Management (Wegovy) 0.25 MG/0.5ML; Inject 0.25 mg under the skin weekly  -     Hemoglobin A1C; Future; Expected date: 2025  -     Comprehensive metabolic panel; Future; Expected date: 2025  -     Comprehensive metabolic panel  5. Erectile dysfunction, unspecified erectile dysfunction type  -     vardenafil (LEVITRA) 10 MG tablet; Take 1 tablet (10 mg total) by mouth daily as needed for erectile dysfunction  6. Benign hypertension  -     Comprehensive metabolic panel; Future; Expected date: 2025  -     Comprehensive metabolic panel  Immunizations and preventive care screenings were discussed with patient today. Appropriate education was printed on patient's after visit summary.    Discussed risks and benefits of prostate cancer screening. We discussed the controversial history of PSA screening for prostate cancer in the [place] as well as the risk of over detection and over treatment of prostate cancer by way of PSA screening.  The patient understands that PSA blood testing is an imperfect way to screen for prostate cancer and that elevated PSA levels in the blood  "may also be caused by infection, inflammation, prostatic trauma or manipulation, urological procedures, or by benign prostatic enlargement.    The role of the digital rectal examination in prostate cancer screening was also discussed and I discussed with him that there is large interobserver variability in the findings of digital rectal examination.    Counseling:  Alcohol/drug use: discussed moderation in alcohol intake, the recommendations for healthy alcohol use  Dental Health: discussed importance of regular tooth brushing, flossing, and dental visits.  Injury prevention: discussed safety/seat belts, safety helmets, smoke detectors, carbon dioxide detectors  Sexual health:  Exercise: the importance of regular exercise/physical activity was discussed. Recommend exercise 5 times per week for at least 60 minutes.          History of Present Illness   Chief Complaint   Patient presents with   • Annual Exam      Adult Annual Physical:  Patient presents for annual physical.     Diet and Physical Activity:  - Diet/Nutrition: well balanced diet. needs to watch carbs  - Exercise: walking. more,    Depression Screening:  - PHQ-2 Score: 0    Review of Systems   Constitutional:  Positive for unexpected weight change (discuss weight).   All other systems reviewed and are negative.        Objective     /60   Pulse 67   Temp (!) 97.3 °F (36.3 °C)   Ht 5' 9\" (1.753 m)   Wt 89.2 kg (196 lb 9.6 oz)   SpO2 97%   BMI 29.03 kg/m²     Physical Exam  Vitals reviewed.   Constitutional:       Appearance: Normal appearance.   HENT:      Right Ear: Tympanic membrane normal.      Left Ear: Tympanic membrane normal.      Mouth/Throat:      Mouth: Mucous membranes are moist.   Eyes:      Pupils: Pupils are equal, round, and reactive to light.   Neck:      Vascular: No carotid bruit.   Cardiovascular:      Rate and Rhythm: Normal rate and regular rhythm.   Pulmonary:      Effort: Pulmonary effort is normal.      Breath sounds: " Normal breath sounds.   Abdominal:      General: Bowel sounds are normal.      Palpations: Abdomen is soft.   Musculoskeletal:      Right lower leg: No edema.      Left lower leg: No edema.   Neurological:      Mental Status: He is alert and oriented to person, place, and time.   Psychiatric:         Mood and Affect: Mood normal.         Behavior: Behavior normal.         Thought Content: Thought content normal.         Judgment: Judgment normal.

## 2024-07-31 NOTE — PATIENT INSTRUCTIONS
"Patient Education     Routine physical for adults   The Basics   Written by the doctors and editors at [facility]   What is a physical? -- A physical is a routine visit, or \"check-up,\" with your doctor. You might also hear it called a \"wellness visit\" or \"preventive visit.\"  During each visit, the doctor will:   Ask about your physical and mental health   Ask about your habits, behaviors, and lifestyle   Do an exam   Give you vaccines if needed   Talk to you about any medicines you take   Give advice about your health   Answer your questions  Getting regular check-ups is an important part of taking care of your health. It can help your doctor find and treat any problems you have. But it's also important for preventing health problems.  A routine physical is different from a \"sick visit.\" A sick visit is when you see a doctor because of a health concern or problem. Since physicals are scheduled ahead of time, you can think about what you want to ask the doctor.  How often should I get a physical? -- It depends on your age and health. In general, for people age 21 years and older:   If you are younger than 50 years, you might be able to get a physical every 3 years.   If you are 50 years or older, your doctor might recommend a physical every year.  If you have an ongoing health condition, like diabetes or high blood pressure, your doctor will probably want to see you more often.  What happens during a physical? -- In general, each visit will include:   Physical exam - The doctor or nurse will check your height, weight, heart rate, and blood pressure. They will also look at your eyes and ears. They will ask about how you are feeling and whether you have any symptoms that bother you.   Medicines - It's a good idea to bring a list of all the medicines you take to each doctor visit. Your doctor will talk to you about your medicines and answer any questions. Tell them if you are having any side effects that bother you. You " "should also tell them if you are having trouble paying for any of your medicines.   Habits and behaviors - This includes:   Your diet   Your exercise habits   Whether you smoke, drink alcohol, or use drugs   Whether you are sexually active   Whether you feel safe at home  Your doctor will talk to you about things you can do to improve your health and lower your risk of health problems. They will also offer help and support. For example, if you want to quit smoking, they can give you advice and might prescribe medicines. If you want to improve your diet or get more physical activity, they can help you with this, too.   Lab tests, if needed - The tests you get will depend on your age and situation. For example, your doctor might want to check your:   Cholesterol   Blood sugar   Iron level   Vaccines - The recommended vaccines will depend on your age, health, and what vaccines you already had. Vaccines are very important because they can prevent certain serious or deadly infections.   Discussion of screening - \"Screening\" means checking for diseases or other health problems before they cause symptoms. Your doctor can recommend screening based on your age, risk, and preferences. This might include tests to check for:   Cancer, such as breast, prostate, cervical, ovarian, colorectal, prostate, lung, or skin cancer   Sexually transmitted infections, such as chlamydia and gonorrhea   Mental health conditions like depression and anxiety  Your doctor will talk to you about the different types of screening tests. They can help you decide which screenings to have. They can also explain what the results might mean.   Answering questions - The physical is a good time to ask the doctor or nurse questions about your health. If needed, they can refer you to other doctors or specialists, too.  Adults older than 65 years often need other care, too. As you get older, your doctor will talk to you about:   How to prevent falling at " home   Hearing or vision tests   Memory testing   How to take your medicines safely   Making sure that you have the help and support you need at home  All topics are updated as new evidence becomes available and our peer review process is complete.  This topic retrieved from [employer] on: May 02, 2024.  Topic 905161 Version 1.0  Release: 32.4.3 - C32.122  © 2024 UpToDate, Inc. and/or its affiliates. All rights reserved.  Consumer Information Use and Disclaimer   Disclaimer: This generalized information is a limited summary of diagnosis, treatment, and/or medication information. It is not meant to be comprehensive and should be used as a tool to help the user understand and/or assess potential diagnostic and treatment options. It does NOT include all information about conditions, treatments, medications, side effects, or risks that may apply to a specific patient. It is not intended to be medical advice or a substitute for the medical advice, diagnosis, or treatment of a health care provider based on the health care provider's examination and assessment of a patient's specific and unique circumstances. Patients must speak with a health care provider for complete information about their health, medical questions, and treatment options, including any risks or benefits regarding use of medications. This information does not endorse any treatments or medications as safe, effective, or approved for treating a specific patient. UpToDate, Inc. and its affiliates disclaim any warranty or liability relating to this information or the use thereof.The use of this information is governed by the Terms of Use, available at https://www.[employer].com/en/know/clinical-effectiveness-terms. 2024© UpToDate, Inc. and its affiliates and/or licensors. All rights reserved.  Copyright   © 2024 UpToDate, Inc. and/or its affiliates. All rights reserved.

## 2024-08-04 DIAGNOSIS — K21.00 ESOPHAGITIS, REFLUX: ICD-10-CM

## 2024-08-04 DIAGNOSIS — J30.9 ALLERGIC RHINITIS, UNSPECIFIED SEASONALITY, UNSPECIFIED TRIGGER: ICD-10-CM

## 2024-08-04 DIAGNOSIS — K21.00 GASTROESOPHAGEAL REFLUX DISEASE WITH ESOPHAGITIS WITHOUT HEMORRHAGE: ICD-10-CM

## 2024-08-04 RX ORDER — OMEPRAZOLE 20 MG/1
20 CAPSULE, DELAYED RELEASE ORAL
Qty: 90 CAPSULE | Refills: 0 | Status: SHIPPED | OUTPATIENT
Start: 2024-08-04

## 2024-08-04 RX ORDER — FLUTICASONE PROPIONATE 50 MCG
SPRAY, SUSPENSION (ML) NASAL
Qty: 48 ML | Refills: 1 | Status: SHIPPED | OUTPATIENT
Start: 2024-08-04

## 2024-08-06 RX ORDER — FAMOTIDINE 20 MG/1
TABLET, FILM COATED ORAL
Qty: 180 TABLET | Refills: 3 | Status: SHIPPED | OUTPATIENT
Start: 2024-08-06

## 2024-08-22 DIAGNOSIS — R73.03 PREDIABETES: ICD-10-CM

## 2024-08-22 DIAGNOSIS — E78.00 PURE HYPERCHOLESTEROLEMIA: ICD-10-CM

## 2024-08-22 DIAGNOSIS — E66.3 OVERWEIGHT (BMI 25.0-29.9): ICD-10-CM

## 2024-08-22 RX ORDER — SEMAGLUTIDE 0.5 MG/.5ML
INJECTION, SOLUTION SUBCUTANEOUS
Qty: 2 ML | Refills: 0 | Status: SHIPPED | OUTPATIENT
Start: 2024-08-22

## 2024-08-23 DIAGNOSIS — I10 BENIGN HYPERTENSION: ICD-10-CM

## 2024-08-23 RX ORDER — LOSARTAN POTASSIUM 25 MG/1
25 TABLET ORAL DAILY
Qty: 90 TABLET | Refills: 1 | Status: SHIPPED | OUTPATIENT
Start: 2024-08-23

## 2024-08-26 NOTE — TELEPHONE ENCOUNTER
Upon review of the In Basket request we were able to locate, review, and update the patient chart as requested for CRC: Colonoscopy.    Any additional questions or concerns should be emailed to the Practice Liaisons via the appropriate education email address, please do not reply via In Basket.    Thank you  Karen Shultz   [facility]

## 2024-09-24 ENCOUNTER — TELEPHONE (OUTPATIENT)
Age: 59
End: 2024-09-24

## 2024-09-24 DIAGNOSIS — R73.03 PREDIABETES: ICD-10-CM

## 2024-09-24 DIAGNOSIS — E78.00 PURE HYPERCHOLESTEROLEMIA: Primary | ICD-10-CM

## 2024-09-24 DIAGNOSIS — I10 BENIGN HYPERTENSION: ICD-10-CM

## 2024-09-24 DIAGNOSIS — E66.3 OVERWEIGHT (BMI 25.0-29.9): ICD-10-CM

## 2024-09-24 RX ORDER — SEMAGLUTIDE 1 MG/.5ML
INJECTION, SOLUTION SUBCUTANEOUS
Qty: 2 ML | Refills: 0 | Status: SHIPPED | OUTPATIENT
Start: 2024-09-24

## 2024-09-24 NOTE — TELEPHONE ENCOUNTER
PA for Wegovy 1 MG/0.5ML SUBMITTED     via    []CMM-KEY:   [x]Dania-Case ID # [id number]   []Faxed to plan   []Other website   []Phone call Case ID #     Office notes sent, clinical questions answered. Awaiting determination    Turnaround time for your insurance to make a decision on your Prior Authorization can take 7-21 business days.

## 2024-09-25 NOTE — TELEPHONE ENCOUNTER
PA for WEGOVY APPROVED     Date(s) approved September 24, 2024 to April 24, 2025     Case #    Patient advised by          [x]Etheriost Message- DUE TO TIME  []Phone call   []LMOM  []L/M to call office as no active Communication consent on file  []Unable to leave detailed message as VM not approved on Communication consent       Pharmacy advised by    [x]Fax  []Phone call    Approval letter scanned into Media Yes

## 2024-10-12 DIAGNOSIS — E78.00 PURE HYPERCHOLESTEROLEMIA: ICD-10-CM

## 2024-10-13 RX ORDER — ROSUVASTATIN CALCIUM 5 MG/1
5 TABLET, COATED ORAL DAILY
Qty: 90 TABLET | Refills: 1 | Status: SHIPPED | OUTPATIENT
Start: 2024-10-13

## 2024-10-22 VITALS — BODY MASS INDEX: 27.17 KG/M2 | WEIGHT: 184 LBS

## 2024-10-22 DIAGNOSIS — R73.03 PREDIABETES: ICD-10-CM

## 2024-10-22 DIAGNOSIS — I10 BENIGN HYPERTENSION: ICD-10-CM

## 2024-10-22 DIAGNOSIS — E66.3 OVERWEIGHT (BMI 25.0-29.9): Primary | ICD-10-CM

## 2024-10-22 DIAGNOSIS — E78.00 PURE HYPERCHOLESTEROLEMIA: ICD-10-CM

## 2024-10-22 RX ORDER — SEMAGLUTIDE 1.7 MG/.75ML
INJECTION, SOLUTION SUBCUTANEOUS
Qty: 3 ML | Refills: 0 | Status: SHIPPED | OUTPATIENT
Start: 2024-10-22

## 2024-10-23 DIAGNOSIS — E78.1 HYPERTRIGLYCERIDEMIA: ICD-10-CM

## 2024-10-23 RX ORDER — OMEGA-3-ACID ETHYL ESTERS 1 G/1
2 CAPSULE, LIQUID FILLED ORAL 2 TIMES DAILY
Qty: 360 CAPSULE | Refills: 1 | Status: SHIPPED | OUTPATIENT
Start: 2024-10-23

## 2024-11-01 DIAGNOSIS — K21.00 GASTROESOPHAGEAL REFLUX DISEASE WITH ESOPHAGITIS WITHOUT HEMORRHAGE: ICD-10-CM

## 2024-11-07 ENCOUNTER — IMMUNIZATIONS (OUTPATIENT)
Dept: FAMILY MEDICINE CLINIC | Facility: CLINIC | Age: 59
End: 2024-11-07
Payer: COMMERCIAL

## 2024-11-07 DIAGNOSIS — Z23 ENCOUNTER FOR IMMUNIZATION: Primary | ICD-10-CM

## 2024-11-07 PROCEDURE — G0008 ADMIN INFLUENZA VIRUS VAC: HCPCS

## 2024-11-07 PROCEDURE — 90673 RIV3 VACCINE NO PRESERV IM: CPT

## 2024-11-20 ENCOUNTER — OFFICE VISIT (OUTPATIENT)
Dept: FAMILY MEDICINE CLINIC | Facility: CLINIC | Age: 59
End: 2024-11-20
Payer: COMMERCIAL

## 2024-11-20 VITALS
OXYGEN SATURATION: 99 % | HEIGHT: 69 IN | DIASTOLIC BLOOD PRESSURE: 60 MMHG | RESPIRATION RATE: 16 BRPM | HEART RATE: 63 BPM | TEMPERATURE: 97.3 F | BODY MASS INDEX: 27.25 KG/M2 | SYSTOLIC BLOOD PRESSURE: 120 MMHG | WEIGHT: 184 LBS

## 2024-11-20 DIAGNOSIS — M25.50 POLYARTHRALGIA: Primary | ICD-10-CM

## 2024-11-20 DIAGNOSIS — I10 BENIGN HYPERTENSION: ICD-10-CM

## 2024-11-20 DIAGNOSIS — E78.00 PURE HYPERCHOLESTEROLEMIA: ICD-10-CM

## 2024-11-20 DIAGNOSIS — L40.9 PSORIASIS: ICD-10-CM

## 2024-11-20 DIAGNOSIS — E66.3 OVERWEIGHT (BMI 25.0-29.9): ICD-10-CM

## 2024-11-20 DIAGNOSIS — M79.674 GREAT TOE PAIN, RIGHT: ICD-10-CM

## 2024-11-20 DIAGNOSIS — E55.9 VITAMIN D DEFICIENCY: ICD-10-CM

## 2024-11-20 DIAGNOSIS — E53.8 VITAMIN B12 DEFICIENCY: ICD-10-CM

## 2024-11-20 DIAGNOSIS — L50.3 DERMATOGRAPHIA: ICD-10-CM

## 2024-11-20 DIAGNOSIS — R73.03 PREDIABETES: ICD-10-CM

## 2024-11-20 PROCEDURE — 99214 OFFICE O/P EST MOD 30 MIN: CPT | Performed by: FAMILY MEDICINE

## 2024-11-20 RX ORDER — SEMAGLUTIDE 2.4 MG/.75ML
INJECTION, SOLUTION SUBCUTANEOUS
Qty: 3 ML | Refills: 0 | Status: SHIPPED | OUTPATIENT
Start: 2024-11-20 | End: 2024-12-24 | Stop reason: SDUPTHER

## 2024-11-20 NOTE — PROGRESS NOTES
Name: Neo Bruce      : 1965      MRN: [id number]  Encounter Provider: Liza Braga DO  Encounter Date: 2024   Encounter department: [facility] PRIMARY CARE  :I have spent a total time of 25 minutes in caring for this patient on the day of the visit/encounter including Diagnostic results, Prognosis, Risks and benefits of tx options, Instructions for management, Impressions, Counseling / Coordination of care, Documenting in the medical record, Reviewing / ordering tests, medicine, procedures  , and Obtaining or reviewing history  .   Assessment & Plan  Polyarthralgia  Concerns for nutrient deficiency with weight loss and GLP-1  Orders:  •  Iron Panel (Includes Ferritin, Iron Sat%, Iron, and TIBC); Future  •  Cyclic citrul peptide antibody, IgG; Future  •  C-reactive protein; Future  •  RF Screen w/ Reflex to Titer; Future  •  STEPH Screen w/ Reflex to Titer/Pattern; Future    Great toe pain, right    Orders:  •  Iron Panel (Includes Ferritin, Iron Sat%, Iron, and TIBC); Future  •  XR toe right great min 2 view; Future    Vitamin D deficiency    Orders:  •  Vitamin D 25 hydroxy; Future    Vitamin B12 deficiency    Orders:  •  Vitamin B12; Future    Dermatographia    Orders:  •  Iron Panel (Includes Ferritin, Iron Sat%, Iron, and TIBC); Future    Psoriasis    Orders:  •  Iron Panel (Includes Ferritin, Iron Sat%, Iron, and TIBC); Future    Overweight (BMI 25.0-29.9)      Orders:  •  Semaglutide-Weight Management (Wegovy) 2.4 MG/0.75ML; Inject 2.4 mg under the skin weekly    Pure hypercholesterolemia    Orders:  •  Semaglutide-Weight Management (Wegovy) 2.4 MG/0.75ML; Inject 2.4 mg under the skin weekly    Benign hypertension    Orders:  •  Semaglutide-Weight Management (Wegovy) 2.4 MG/0.75ML; Inject 2.4 mg under the skin weekly    Prediabetes    Orders:  •  Semaglutide-Weight Management (Wegovy) 2.4 MG/0.75ML; Inject 2.4 mg under the skin weekly           History of Present Illness     59  "year old male with NEW complaints.    Leg Pain   The incident occurred more than 1 week ago (3 wks). There was no injury mechanism. The pain is present in the right knee and left knee (worse at nighttime). The quality of the pain is described as aching. The pain is at a severity of 6/10. The pain is moderate. The pain has been Intermittent since onset. Pertinent negatives include no numbness (tingling). He has tried NSAIDs (Vitamins -Vit D OTC, Mag OTC, B complex) for the symptoms.     Chief Complaint   Patient presents with   • Leg Pain     Pt is here for leg pain and knee pain that bothers him when he lays down. Pt also has a tingling feeling on his back and stomach area but no rash. Pt has also not been sleeping due to pain his legs. Pt also has pain in right big toe       Review of Systems   Constitutional:         60 y/o male that does not look as his stated age-on wegovy   Musculoskeletal:  Positive for arthralgias and myalgias.        B/l knee pain, feet   Neurological:  Negative for numbness (tingling).   Hematological: Negative.    Psychiatric/Behavioral: Negative.            Objective   /60 (BP Location: Right arm, Patient Position: Sitting)   Pulse 63   Temp (!) 97.3 °F (36.3 °C) (Temporal)   Resp 16   Ht 5' 9\" (1.753 m)   Wt 83.5 kg (184 lb)   SpO2 99%   BMI 27.17 kg/m²      Physical Exam  Constitutional:       Appearance: Normal appearance.   HENT:      Head: Normocephalic and atraumatic.      Mouth/Throat:      Mouth: Mucous membranes are moist.   Eyes:      Pupils: Pupils are equal, round, and reactive to light.   Cardiovascular:      Rate and Rhythm: Normal rate and regular rhythm.   Pulmonary:      Effort: Pulmonary effort is normal.      Breath sounds: Normal breath sounds.   Abdominal:      General: Bowel sounds are normal.      Palpations: Abdomen is soft.   Musculoskeletal:         General: Tenderness (b/l knees) present.      Comments: R great toe pain, bunion present, 1st " metatarsal pain   Skin:     Findings: No rash (dermatographism).      Comments: Dermatographic urticaria    Neurological:      Mental Status: He is alert and oriented to person, place, and time.      Sensory: No sensory deficit.   Psychiatric:         Mood and Affect: Mood normal.         Behavior: Behavior normal.         Thought Content: Thought content normal.         Judgment: Judgment normal.

## 2024-11-20 NOTE — ASSESSMENT & PLAN NOTE
Orders:  •  Semaglutide-Weight Management (Wegovy) 2.4 MG/0.75ML; Inject 2.4 mg under the skin weekly

## 2024-11-22 LAB
25(OH)D3 SERPL-MCNC: 21 NG/ML (ref 30–100)
ANA SER QL IF: NEGATIVE
CCP IGG SERPL-ACNC: <16 UNITS
CRP SERPL-MCNC: <3 MG/L
FERRITIN SERPL-MCNC: 4 NG/ML (ref 38–380)
IRON SATN MFR SERPL: 5 % (CALC) (ref 20–48)
IRON SERPL-MCNC: 25 MCG/DL (ref 50–180)
RHEUMATOID FACT SERPL-ACNC: <10 IU/ML
TIBC SERPL-MCNC: 493 MCG/DL (CALC) (ref 250–425)
VIT B12 SERPL-MCNC: 372 PG/ML (ref 200–1100)

## 2024-11-23 ENCOUNTER — RESULTS FOLLOW-UP (OUTPATIENT)
Dept: FAMILY MEDICINE CLINIC | Facility: CLINIC | Age: 59
End: 2024-11-23

## 2024-11-23 NOTE — RESULT ENCOUNTER NOTE
Multiple abnormalities on lab work.  Vitamin D is low so at least 5000 international units daily are needed.  Vitamin B12 is also low and 1000 mcg daily is recommended as discussed inflammatory markers are all negative which is good.  Significantly low ferritin and iron are likely as well contributing to symptoms.  Definitely needs to start an iron supplement.  Patient has had lower endoscopy and no evidence of iron loss there through any bleeding.  How have the color of the stools been?  Do not know if an upper endoscopy should also be done to rule out any causes of blood loss/low iron from stomach.  Since this is relatively new since patient is on Wegovy, I am not sure there a factor of side effect or causality of all the above abnormalities.  May need to consider discontinuing

## 2024-11-27 NOTE — TELEPHONE ENCOUNTER
----- Message from Liza Braga DO sent at 11/23/2024  9:23 AM EST -----  Multiple abnormalities on lab work.  Vitamin D is low so at least 5000 international units daily are needed.  Vitamin B12 is also low and 1000 mcg daily is recommended as discussed inflammatory markers are all negative which is good.  Significantly low ferritin and iron are likely as well contributing to symptoms.  Definitely needs to start an iron supplement.  Patient has had lower endoscopy and no evidence of iron loss there through any bleeding.  How have the color of the stools been?  Do not know if an upper endoscopy should also be done to rule out any causes of blood loss/low iron from stomach.  Since this is relatively new since patient is on Wegovy, I am not sure there a factor of side effect or causality of all the above abnormalities.  May need to consider discontinuing

## 2024-11-29 NOTE — TELEPHONE ENCOUNTER
----- Message from Liza Braga DO sent at 11/27/2024  5:49 PM EST -----  Ferrous sulfate OTC  ----- Message -----  From: Mey Hanna  Sent: 11/27/2024   3:07 PM EST  To: Liza Braga DO

## 2024-11-30 ENCOUNTER — APPOINTMENT (OUTPATIENT)
Dept: RADIOLOGY | Facility: MEDICAL CENTER | Age: 59
End: 2024-11-30
Payer: COMMERCIAL

## 2024-11-30 DIAGNOSIS — M79.674 GREAT TOE PAIN, RIGHT: ICD-10-CM

## 2024-11-30 PROCEDURE — 73660 X-RAY EXAM OF TOE(S): CPT

## 2024-12-21 DIAGNOSIS — I10 BENIGN HYPERTENSION: ICD-10-CM

## 2024-12-21 DIAGNOSIS — E66.3 OVERWEIGHT (BMI 25.0-29.9): ICD-10-CM

## 2024-12-21 DIAGNOSIS — E78.00 PURE HYPERCHOLESTEROLEMIA: ICD-10-CM

## 2024-12-21 DIAGNOSIS — R73.03 PREDIABETES: ICD-10-CM

## 2024-12-24 RX ORDER — SEMAGLUTIDE 2.4 MG/.75ML
2.4 INJECTION, SOLUTION SUBCUTANEOUS WEEKLY
Qty: 3 ML | Refills: 1 | Status: SHIPPED | OUTPATIENT
Start: 2024-12-24

## 2024-12-27 ENCOUNTER — TELEPHONE (OUTPATIENT)
Age: 59
End: 2024-12-27

## 2024-12-27 NOTE — TELEPHONE ENCOUNTER
Pt called in asking if he can transfer his care from Dr. Martell to Dr. Schaefer. Appointment for his 1 yr f/u was established with Dr. Schaefer for 1/2/25 at 9:00 AM.     Cb: [phone]

## 2024-12-28 LAB
ALBUMIN SERPL-MCNC: 4.3 G/DL (ref 3.6–5.1)
ALBUMIN/GLOB SERPL: 1.7 (CALC) (ref 1–2.5)
ALP SERPL-CCNC: 40 U/L (ref 35–144)
ALT SERPL-CCNC: 22 U/L (ref 9–46)
AST SERPL-CCNC: 20 U/L (ref 10–35)
BILIRUB SERPL-MCNC: 0.4 MG/DL (ref 0.2–1.2)
BUN SERPL-MCNC: 17 MG/DL (ref 7–25)
BUN/CREAT SERPL: NORMAL (CALC) (ref 6–22)
CALCIUM SERPL-MCNC: 9.3 MG/DL (ref 8.6–10.3)
CHLORIDE SERPL-SCNC: 106 MMOL/L (ref 98–110)
CO2 SERPL-SCNC: 28 MMOL/L (ref 20–32)
CREAT SERPL-MCNC: 0.94 MG/DL (ref 0.7–1.3)
GFR/BSA.PRED SERPLBLD CYS-BASED-ARV: 93 ML/MIN/1.73M2
GLOBULIN SER CALC-MCNC: 2.6 G/DL (CALC) (ref 1.9–3.7)
GLUCOSE SERPL-MCNC: 89 MG/DL (ref 65–99)
POTASSIUM SERPL-SCNC: 4.4 MMOL/L (ref 3.5–5.3)
PROT SERPL-MCNC: 6.9 G/DL (ref 6.1–8.1)
PSA SERPL-MCNC: 2.74 NG/ML
SODIUM SERPL-SCNC: 140 MMOL/L (ref 135–146)

## 2025-01-02 ENCOUNTER — OFFICE VISIT (OUTPATIENT)
Dept: UROLOGY | Facility: MEDICAL CENTER | Age: 60
End: 2025-01-02
Payer: COMMERCIAL

## 2025-01-02 VITALS
SYSTOLIC BLOOD PRESSURE: 110 MMHG | BODY MASS INDEX: 27.17 KG/M2 | HEART RATE: 80 BPM | HEIGHT: 69 IN | DIASTOLIC BLOOD PRESSURE: 70 MMHG | OXYGEN SATURATION: 98 %

## 2025-01-02 DIAGNOSIS — Z80.42 FAMILY HISTORY OF PROSTATE CANCER IN FATHER: ICD-10-CM

## 2025-01-02 DIAGNOSIS — N20.0 CALCULUS OF KIDNEY: ICD-10-CM

## 2025-01-02 DIAGNOSIS — N40.0 BPH WITHOUT OBSTRUCTION/LOWER URINARY TRACT SYMPTOMS: Primary | ICD-10-CM

## 2025-01-02 LAB
SL AMB  POCT GLUCOSE, UA: NORMAL
SL AMB LEUKOCYTE ESTERASE,UA: NORMAL
SL AMB POCT BILIRUBIN,UA: NORMAL
SL AMB POCT BLOOD,UA: NORMAL
SL AMB POCT CLARITY,UA: CLEAR
SL AMB POCT COLOR,UA: YELLOW
SL AMB POCT KETONES,UA: NORMAL
SL AMB POCT NITRITE,UA: NORMAL
SL AMB POCT PH,UA: 6
SL AMB POCT SPECIFIC GRAVITY,UA: >=1.03
SL AMB POCT URINE PROTEIN: NORMAL
SL AMB POCT UROBILINOGEN: 0.2

## 2025-01-02 PROCEDURE — 99213 OFFICE O/P EST LOW 20 MIN: CPT | Performed by: UROLOGY

## 2025-01-02 PROCEDURE — 81003 URINALYSIS AUTO W/O SCOPE: CPT | Performed by: UROLOGY

## 2025-01-02 NOTE — PROGRESS NOTES
Name: Neo Bruce      : 1965      MRN: [id number]  Encounter Provider: Irineo Schaefer MD  Encounter Date: 2025   [facility]  :  Assessment & Plan  Family history of prostate cancer in father  Most recent PSA 2.74 on 2024  Normal PERLITA  - continue normal prostate cancer screening at this time       BPH without obstruction/lower urinary tract symptoms  RBUS measured prostate around 60 cc in 2019  No bothersome LUTS at this time  - continue to monitor at this time    Orders:    POCT urine dip auto non-scope    Calculus of kidney  Had left URS, laser litho in 2019 for 5 mm stone  Passed punctate stone in 2019  Punctate right upper pole stone on CT scan in 10/2022  Currently asymptomatic of stones  - renal sonogram/KUB      History of Present Illness   Neo Bruce is a 59 y.o. male who presents for annual follow up    Reports no bothersome urinary symptoms at this time  No gross hematuria or dysuria  No UTI    Occasionally using vardenafil 10 mg for ED  Reports medication is effective when he takes it    Most recent PSA 2.74   Had sonogram in 2019 that estimated prostate size around 60 cc      Review of Systems   All other systems reviewed and are negative.    Past Medical History   Past Medical History:   Diagnosis Date    Calculus, ureter     Colon polyp     Diverticulosis     Enlarged prostate     GERD (gastroesophageal reflux disease)     Hiatal hernia     Hyperlipidemia     Hypertension 2022    Kidney stone 2019    Multiple benign polyps of large intestine     Pain in joint of right shoulder region     Psoriasis     hands and scalp    Rectal polyp     Seasonal allergies     Seborrheic dermatitis of scalp     Wears glasses      Past Surgical History:   Procedure Laterality Date    APPENDECTOMY  1982    COLONOSCOPY  2005    FIBEROPTIC; [id number] - (STASIK)  - (Stalin) - (Stalin)    LASIK      CORNEAL    DE CYSTO/URETERO  W/LITHOTRIPSY &INDWELL STENT INSRT Left 1/15/2019    Procedure: CYSTO, URETEROSCOPY W/HOLMIUM LASER, RETROGRADE PYELOGRAM, STENT INSERTION;  Surgeon: Jose Prasad MD;  Location: [facility];  Service: Urology    SEPTOPLASTY  10/1996    VASECTOMY  11/2002    SURGERY VAS DEFERENS     Family History   Problem Relation Age of Onset    Melanoma Father         MALIGNANT MELANOMA OF THE SKIN    Prostate cancer Father     Coronary artery disease Father     Myocarditis Father     Other Mother         BACK PAIN    Hypertension Mother     Atrial fibrillation Mother     Osteoporosis Brother         brother diagnosed with elevated PSA      reports that he quit smoking about 32 years ago. His smoking use included cigarettes. He quit smokeless tobacco use about 32 years ago. He reports current alcohol use. He reports that he does not use drugs.  Current Outpatient Medications on File Prior to Visit   Medication Sig Dispense Refill    Blood Pressure Monitoring (Blood Pressure Digital Soln) KIT       famotidine (PEPCID) 20 mg tablet TAKE 1 TABLET BY MOUTH TWICE A DAY AS NEEDED FOR HEARTBURN 180 tablet 3    fluticasone (FLONASE) 50 mcg/act nasal spray SPRAY 2 SPRAYS INTO EACH NOSTRIL EVERY DAY 48 mL 1    levocetirizine (XYZAL) 5 MG tablet TAKE 1 TABLET BY MOUTH EVERY DAY IN THE EVENING 90 tablet 1    losartan (COZAAR) 25 mg tablet TAKE 1 TABLET (25 MG TOTAL) BY MOUTH DAILY. 90 tablet 1    omega-3-acid ethyl esters (LOVAZA) 1 g capsule TAKE 2 CAPSULES BY MOUTH TWICE A  capsule 1    omeprazole (PriLOSEC) 20 mg delayed release capsule TAKE 1 CAPSULE BY MOUTH EVERY DAY BEFORE BREAKFAST 90 capsule 1    rosuvastatin (CRESTOR) 5 mg tablet TAKE 1 TABLET (5 MG TOTAL) BY MOUTH DAILY. 90 tablet 1    Semaglutide-Weight Management (Wegovy) 2.4 MG/0.75ML INJECT 2.4 MG UNDER THE SKIN WEEKLY 3 mL 1    triamcinolone (KENALOG) 0.1 % cream APPLY TO HAND ECZEMA TWICE DAILY FOR TWO WEEKS ONLY AS NEEDED FOR FLARING RASH.      vardenafil (LEVITRA)  "10 MG tablet Take 1 tablet (10 mg total) by mouth daily as needed for erectile dysfunction      mupirocin (BACTROBAN) 2 % ointment APPLY A SMALL AMOUNT TO THE OPENED SCRATCHED AREAS ON THE HANDS TWICE DAILY UNTIL WELL HEALED.       No current facility-administered medications on file prior to visit.     Allergies   Allergen Reactions    No Known Allergies Other (See Comments)         Objective   /70 (BP Location: Left arm, Patient Position: Sitting, Cuff Size: Standard)   Pulse 80   Ht 5' 9\" (1.753 m)   SpO2 98%   BMI 27.17 kg/m²     Physical Exam  Vitals and nursing note reviewed.   Constitutional:       General: He is not in acute distress.     Appearance: He is well-developed.   HENT:      Head: Normocephalic and atraumatic.   Eyes:      Conjunctiva/sclera: Conjunctivae normal.   Cardiovascular:      Rate and Rhythm: Normal rate and regular rhythm.      Heart sounds: No murmur heard.  Pulmonary:      Effort: Pulmonary effort is normal. No respiratory distress.      Breath sounds: Normal breath sounds.   Abdominal:      Palpations: Abdomen is soft.      Tenderness: There is no abdominal tenderness.   Genitourinary:     Comments: PERLITA: smooth, no nodules, non-tender  Musculoskeletal:         General: No swelling.      Cervical back: Neck supple.   Skin:     General: Skin is warm and dry.      Capillary Refill: Capillary refill takes less than 2 seconds.   Neurological:      Mental Status: He is alert.   Psychiatric:         Mood and Affect: Mood normal.          Results  Lab Results   Component Value Date    PSA 2.74 12/27/2024    PSA 1.84 06/22/2022    PSA 1.8 06/21/2021     Lab Results   Component Value Date    CALCIUM 9.3 12/27/2024     09/10/2016    K 4.4 12/27/2024    CO2 28 12/27/2024     12/27/2024    BUN 17 12/27/2024    CREATININE 0.94 12/27/2024     Lab Results   Component Value Date    WBC 12.25 (H) 09/08/2019    HGB 16.4 09/08/2019    HCT 47.9 09/08/2019    MCV 96 09/08/2019    PLT " 162 09/08/2019       Office Urine Dip  No results found for this or any previous visit (from the past hour).]

## 2025-01-07 ENCOUNTER — APPOINTMENT (OUTPATIENT)
Dept: RADIOLOGY | Facility: MEDICAL CENTER | Age: 60
End: 2025-01-07
Payer: COMMERCIAL

## 2025-01-07 ENCOUNTER — HOSPITAL ENCOUNTER (OUTPATIENT)
Dept: ULTRASOUND IMAGING | Facility: MEDICAL CENTER | Age: 60
Discharge: HOME | End: 2025-01-07
Payer: COMMERCIAL

## 2025-01-07 DIAGNOSIS — N20.0 CALCULUS OF KIDNEY: ICD-10-CM

## 2025-01-07 PROCEDURE — 74018 RADEX ABDOMEN 1 VIEW: CPT

## 2025-01-07 PROCEDURE — 76775 US EXAM ABDO BACK WALL LIM: CPT

## 2025-01-08 DIAGNOSIS — J30.2 SEASONAL ALLERGIC RHINITIS, UNSPECIFIED TRIGGER: ICD-10-CM

## 2025-01-09 RX ORDER — LEVOCETIRIZINE DIHYDROCHLORIDE 5 MG/1
5 TABLET, FILM COATED ORAL EVERY EVENING
Qty: 90 TABLET | Refills: 1 | Status: SHIPPED | OUTPATIENT
Start: 2025-01-09

## 2025-01-14 ENCOUNTER — RESULTS FOLLOW-UP (OUTPATIENT)
Dept: UROLOGY | Facility: MEDICAL CENTER | Age: 60
End: 2025-01-14

## 2025-01-18 DIAGNOSIS — J30.9 ALLERGIC RHINITIS, UNSPECIFIED SEASONALITY, UNSPECIFIED TRIGGER: ICD-10-CM

## 2025-01-20 RX ORDER — FLUTICASONE PROPIONATE 50 MCG
SPRAY, SUSPENSION (ML) NASAL
Qty: 48 ML | Refills: 1 | Status: SHIPPED | OUTPATIENT
Start: 2025-01-20

## 2025-01-21 DIAGNOSIS — E55.9 VITAMIN D DEFICIENCY: ICD-10-CM

## 2025-01-21 DIAGNOSIS — E53.8 VITAMIN B12 DEFICIENCY: ICD-10-CM

## 2025-01-21 DIAGNOSIS — R73.03 PREDIABETES: ICD-10-CM

## 2025-01-21 DIAGNOSIS — R73.01 ELEVATED FASTING BLOOD SUGAR: Primary | ICD-10-CM

## 2025-01-21 DIAGNOSIS — R79.0 LOW IRON STORES: ICD-10-CM

## 2025-01-31 DIAGNOSIS — I10 BENIGN HYPERTENSION: ICD-10-CM

## 2025-01-31 RX ORDER — LOSARTAN POTASSIUM 25 MG/1
25 TABLET ORAL DAILY
Qty: 90 TABLET | Refills: 1 | Status: SHIPPED | OUTPATIENT
Start: 2025-01-31

## 2025-02-01 LAB
ALBUMIN SERPL-MCNC: 4.4 G/DL (ref 3.6–5.1)
ALBUMIN/GLOB SERPL: 1.8 (CALC) (ref 1–2.5)
ALP SERPL-CCNC: 47 U/L (ref 35–144)
ALT SERPL-CCNC: 18 U/L (ref 9–46)
AST SERPL-CCNC: 14 U/L (ref 10–35)
BILIRUB SERPL-MCNC: 0.5 MG/DL (ref 0.2–1.2)
BUN SERPL-MCNC: 16 MG/DL (ref 7–25)
BUN/CREAT SERPL: NORMAL (CALC) (ref 6–22)
CALCIUM SERPL-MCNC: 9.4 MG/DL (ref 8.6–10.3)
CHLORIDE SERPL-SCNC: 105 MMOL/L (ref 98–110)
CHOLEST SERPL-MCNC: 134 MG/DL
CHOLEST/HDLC SERPL: 2.7 (CALC)
CO2 SERPL-SCNC: 27 MMOL/L (ref 20–32)
CREAT SERPL-MCNC: 0.96 MG/DL (ref 0.7–1.3)
GFR/BSA.PRED SERPLBLD CYS-BASED-ARV: 91 ML/MIN/1.73M2
GLOBULIN SER CALC-MCNC: 2.5 G/DL (CALC) (ref 1.9–3.7)
GLUCOSE SERPL-MCNC: 90 MG/DL (ref 65–99)
HBA1C MFR BLD: 5.4 % OF TOTAL HGB
HDLC SERPL-MCNC: 50 MG/DL
LDLC SERPL CALC-MCNC: 68 MG/DL (CALC)
NONHDLC SERPL-MCNC: 84 MG/DL (CALC)
POTASSIUM SERPL-SCNC: 4.3 MMOL/L (ref 3.5–5.3)
PROT SERPL-MCNC: 6.9 G/DL (ref 6.1–8.1)
SODIUM SERPL-SCNC: 140 MMOL/L (ref 135–146)
TRIGL SERPL-MCNC: 82 MG/DL

## 2025-02-02 DIAGNOSIS — K21.00 GASTROESOPHAGEAL REFLUX DISEASE WITH ESOPHAGITIS WITHOUT HEMORRHAGE: ICD-10-CM

## 2025-02-04 ENCOUNTER — OFFICE VISIT (OUTPATIENT)
Dept: PODIATRY | Facility: CLINIC | Age: 60
End: 2025-02-04
Payer: COMMERCIAL

## 2025-02-04 VITALS — WEIGHT: 184 LBS | BODY MASS INDEX: 27.89 KG/M2 | HEIGHT: 68 IN

## 2025-02-04 DIAGNOSIS — M25.571 PAIN IN JOINT OF RIGHT FOOT: ICD-10-CM

## 2025-02-04 DIAGNOSIS — M20.61 ACQUIRED DEFORMITY OF RIGHT TOE: ICD-10-CM

## 2025-02-04 DIAGNOSIS — M20.21 HALLUX RIGIDUS, RIGHT FOOT: Primary | ICD-10-CM

## 2025-02-04 LAB
25(OH)D3 SERPL-MCNC: 50 NG/ML (ref 30–100)
VIT B12 SERPL-MCNC: 472 PG/ML (ref 200–1100)

## 2025-02-04 PROCEDURE — 99203 OFFICE O/P NEW LOW 30 MIN: CPT | Performed by: PODIATRIST

## 2025-02-04 NOTE — PROGRESS NOTES
PATIENT:  Neo Bruce  1965       ASSESSMENT:     1. Hallux rigidus, right foot  Ambulatory referral to Physical Therapy      2. Acquired deformity of right toe        3. Pain in joint of right foot                  PLAN:  1. Reviewed medical records.  Reviewed the note from PCP.  Patient was counseled and educated on the condition and the diagnosis.    2. X-ray was personally reviewed.  The radiological findings were discussed with the patient.  DJD in right 1st MPJ  3. The exam and symptoms are consistent with hallux rigidus.  The diagnosis, treatment options and prognosis were discussed with the patient.    4. He would get benefited from orthotics.  Referred him to PT for custom orthotics.    5.  Instructed supportive care, NSAIDs, icing, and proper footwear/ arch support.   Discussed possible injection depending on the progress.   6. Discussed surgical option if conservative care does not help him in the future.          Imaging: I have personally reviewed pertinent films in PACS  Labs, pathology, and Other Studies: I have personally reviewed pertinent reports.        Subjective:       HPI  The patient presents with chief complaint of pain in right great toe joint.  He reports he has arthritis and bunion.  Increased symptoms in the past year.  It has been bothering him more in the last 6 months.  It is intermittent.  Denied any injury or swelling.   No associated numbness or paresthesia.  No significant weakness or dysfunction.  No history of diabetes.  He was seen by his PCP in November and had an X-ray.         The following portions of the patient's history were reviewed and updated as appropriate: allergies, current medications, past family history, past medical history, past social history, past surgical history and problem list.  All pertinent labs and images were reviewed.      Past Medical History  Past Medical History:   Diagnosis Date    Calculus, ureter     Colon polyp      Diverticulosis     Enlarged prostate     GERD (gastroesophageal reflux disease)     Hiatal hernia     Hyperlipidemia     Hypertension 08/2022    Kidney stone 2019    Multiple benign polyps of large intestine     Pain in joint of right shoulder region     Psoriasis     hands and scalp    Rectal polyp     Seasonal allergies     Seborrheic dermatitis of scalp     Wears glasses        Past Surgical History  Past Surgical History:   Procedure Laterality Date    APPENDECTOMY  01/1982    COLONOSCOPY  03/2005    FIBEROPTIC; [id number] -2012 (STASIK) 2016 - 2019(Stalin) - 2024(Stalin)    LASIK      CORNEAL    [place] CYSTO/URETERO W/LITHOTRIPSY &INDWELL STENT INSRT Left 1/15/2019    Procedure: CYSTO, URETEROSCOPY W/HOLMIUM LASER, RETROGRADE PYELOGRAM, STENT INSERTION;  Surgeon: Jose Prasad MD;  Location: [facility];  Service: Urology    SEPTOPLASTY  10/1996    VASECTOMY  11/2002    SURGERY VAS DEFERENS        Allergies:  No known allergies    Medications:  Current Outpatient Medications   Medication Sig Dispense Refill    Blood Pressure Monitoring (Blood Pressure Digital Soln) KIT       famotidine (PEPCID) 20 mg tablet TAKE 1 TABLET BY MOUTH TWICE A DAY AS NEEDED FOR HEARTBURN 180 tablet 3    fluticasone (FLONASE) 50 mcg/act nasal spray SPRAY 2 SPRAYS INTO EACH NOSTRIL EVERY DAY 48 mL 1    levocetirizine (XYZAL) 5 MG tablet TAKE 1 TABLET BY MOUTH EVERY DAY IN THE EVENING 90 tablet 1    losartan (COZAAR) 25 mg tablet TAKE 1 TABLET (25 MG TOTAL) BY MOUTH DAILY. 90 tablet 1    mupirocin (BACTROBAN) 2 % ointment APPLY A SMALL AMOUNT TO THE OPENED SCRATCHED AREAS ON THE HANDS TWICE DAILY UNTIL WELL HEALED.      omega-3-acid ethyl esters (LOVAZA) 1 g capsule TAKE 2 CAPSULES BY MOUTH TWICE A  capsule 1    omeprazole (PriLOSEC) 20 mg delayed release capsule TAKE 1 CAPSULE BY MOUTH EVERY DAY BEFORE BREAKFAST 90 capsule 1    rosuvastatin (CRESTOR) 5 mg tablet TAKE 1 TABLET (5 MG TOTAL) BY MOUTH DAILY. 90 tablet 1    Semaglutide-Weight  "Management (Wegovy) 2.4 MG/0.75ML INJECT 2.4 MG UNDER THE SKIN WEEKLY 3 mL 1    triamcinolone (KENALOG) 0.1 % cream APPLY TO HAND ECZEMA TWICE DAILY FOR TWO WEEKS ONLY AS NEEDED FOR FLARING RASH.      vardenafil (LEVITRA) 10 MG tablet Take 1 tablet (10 mg total) by mouth daily as needed for erectile dysfunction       No current facility-administered medications for this visit.       Social History:  Social History     Socioeconomic History    Marital status: /Civil Union     Spouse name: None    Number of children: None    Years of education: None    Highest education level: None   Occupational History    None   Tobacco Use    Smoking status: Former     Current packs/day: 0.00     Types: Cigarettes     Quit date:      Years since quittin.1    Smokeless tobacco: Former     Quit date:    Vaping Use    Vaping status: Never Used   Substance and Sexual Activity    Alcohol use: Yes     Comment: socially    Drug use: No    Sexual activity: Yes   Other Topics Concern    None   Social History Narrative    None     Social Drivers of Health     Financial Resource Strain: Not on file   Food Insecurity: Not on file   Transportation Needs: Not on file   Physical Activity: Not on file   Stress: Not on file   Social Connections: Not on file   Intimate Partner Violence: Not on file   Housing Stability: Not on file          Review of Systems   Constitutional:  Negative for chills and fever.   Respiratory:  Negative for cough and shortness of breath.    Cardiovascular:  Negative for chest pain and leg swelling.   Gastrointestinal:  Negative for nausea and vomiting.   Musculoskeletal:  Positive for arthralgias. Negative for gait problem.   Skin:  Negative for wound.   Allergic/Immunologic: Negative for immunocompromised state.   Neurological:  Negative for weakness and numbness.   Hematological: Negative.    Psychiatric/Behavioral:  Negative for behavioral problems and confusion.          Objective:      Ht 5' 8\" " (1.727 m)   Wt 83.5 kg (184 lb)   BMI 27.98 kg/m²          Physical Exam  Vitals reviewed.   Constitutional:       General: He is not in acute distress.     Appearance: He is not toxic-appearing or diaphoretic.   HENT:      Head: Normocephalic and atraumatic.   Eyes:      Extraocular Movements: Extraocular movements intact.   Cardiovascular:      Rate and Rhythm: Normal rate and regular rhythm.      Pulses: Normal pulses.           Dorsalis pedis pulses are 2+ on the right side and 2+ on the left side.        Posterior tibial pulses are 2+ on the right side and 2+ on the left side.   Pulmonary:      Effort: Pulmonary effort is normal. No respiratory distress.   Musculoskeletal:         General: Deformity present. No signs of injury.      Cervical back: Normal range of motion and neck supple.      Right lower leg: No edema.      Left lower leg: No edema.      Right foot: No foot drop.      Left foot: No foot drop.      Comments: Hypertrophy of right 1st MPJ with limited ROM.  No acute swelling or redness.  No warmth.  Lateral angulation of right great toe.    Skin:     General: Skin is warm.      Capillary Refill: Capillary refill takes less than 2 seconds.      Coloration: Skin is not cyanotic or mottled.      Findings: No abscess, erythema or wound.      Nails: There is no clubbing.   Neurological:      General: No focal deficit present.      Mental Status: He is alert and oriented to person, place, and time.      Cranial Nerves: No cranial nerve deficit.      Sensory: No sensory deficit.      Motor: No weakness.      Coordination: Coordination normal.   Psychiatric:         Mood and Affect: Mood normal.         Behavior: Behavior normal.         Thought Content: Thought content normal.         Judgment: Judgment normal.

## 2025-02-04 NOTE — LETTER
February 4, 2025     Liza Braga DO  [address] [facility]  [address]  [facility] [address]    Patient: Neo Bruce   YOB: 1965   Date of Visit: 2/4/2025       Dear Dr. Braga:    Thank you for referring Neo Bruce to me for evaluation. Below are my notes for this consultation.    If you have questions, please do not hesitate to call me. I look forward to following your patient along with you.         Sincerely,        Lucian Scott DPM        CC: No Recipients    Lucian Scott DPM  2/4/2025 11:41 AM  Sign when Signing Visit                 PATIENT:  Neo Bruce  1965       ASSESSMENT:     1. Hallux rigidus, right foot  Ambulatory referral to Physical Therapy      2. Acquired deformity of right toe        3. Pain in joint of right foot                  PLAN:  1. Reviewed medical records.  Reviewed the note from PCP.  Patient was counseled and educated on the condition and the diagnosis.    2. X-ray was personally reviewed.  The radiological findings were discussed with the patient.  DJD in right 1st MPJ  3. The exam and symptoms are consistent with hallux rigidus.  The diagnosis, treatment options and prognosis were discussed with the patient.    4. He would get benefited from orthotics.  Referred him to PT for custom orthotics.    5.  Instructed supportive care, NSAIDs, icing, and proper footwear/ arch support.   Discussed possible injection depending on the progress.   6. Discussed surgical option if conservative care does not help him in the future.          Imaging: I have personally reviewed pertinent films in PACS  Labs, pathology, and Other Studies: I have personally reviewed pertinent reports.        Subjective:       HPI  The patient presents with chief complaint of pain in right great toe joint.  He reports he has arthritis and bunion.  Increased symptoms in the past year.  It has been bothering him more in the last 6 months.  It is intermittent.  Denied any injury or swelling.   No  associated numbness or paresthesia.  No significant weakness or dysfunction.  No history of diabetes.  He was seen by his PCP in November and had an X-ray.         The following portions of the patient's history were reviewed and updated as appropriate: allergies, current medications, past family history, past medical history, past social history, past surgical history and problem list.  All pertinent labs and images were reviewed.      Past Medical History  Past Medical History:   Diagnosis Date   • Calculus, ureter    • Colon polyp    • Diverticulosis    • Enlarged prostate    • GERD (gastroesophageal reflux disease)    • Hiatal hernia    • Hyperlipidemia    • Hypertension 08/2022   • Kidney stone 2019   • Multiple benign polyps of large intestine    • Pain in joint of right shoulder region    • Psoriasis     hands and scalp   • Rectal polyp    • Seasonal allergies    • Seborrheic dermatitis of scalp    • Wears glasses        Past Surgical History  Past Surgical History:   Procedure Laterality Date   • APPENDECTOMY  01/1982   • COLONOSCOPY  03/2005    FIBEROPTIC; [id number] -2012 (STASIK) 2016 - 2019(Stalin) - 2024(Stalin)   • LASIK      CORNEAL   • NV CYSTO/URETERO W/LITHOTRIPSY &INDWELL STENT INSRT Left 1/15/2019    Procedure: CYSTO, URETEROSCOPY W/HOLMIUM LASER, RETROGRADE PYELOGRAM, STENT INSERTION;  Surgeon: Jose Prasad MD;  Location: [facility];  Service: Urology   • SEPTOPLASTY  10/1996   • VASECTOMY  11/2002    SURGERY VAS DEFERENS        Allergies:  No known allergies    Medications:  Current Outpatient Medications   Medication Sig Dispense Refill   • Blood Pressure Monitoring (Blood Pressure Digital Soln) KIT      • famotidine (PEPCID) 20 mg tablet TAKE 1 TABLET BY MOUTH TWICE A DAY AS NEEDED FOR HEARTBURN 180 tablet 3   • fluticasone (FLONASE) 50 mcg/act nasal spray SPRAY 2 SPRAYS INTO EACH NOSTRIL EVERY DAY 48 mL 1   • levocetirizine (XYZAL) 5 MG tablet TAKE 1 TABLET BY MOUTH EVERY DAY IN THE EVENING 90  tablet 1   • losartan (COZAAR) 25 mg tablet TAKE 1 TABLET (25 MG TOTAL) BY MOUTH DAILY. 90 tablet 1   • mupirocin (BACTROBAN) 2 % ointment APPLY A SMALL AMOUNT TO THE OPENED SCRATCHED AREAS ON THE HANDS TWICE DAILY UNTIL WELL HEALED.     • omega-3-acid ethyl esters (LOVAZA) 1 g capsule TAKE 2 CAPSULES BY MOUTH TWICE A  capsule 1   • omeprazole (PriLOSEC) 20 mg delayed release capsule TAKE 1 CAPSULE BY MOUTH EVERY DAY BEFORE BREAKFAST 90 capsule 1   • rosuvastatin (CRESTOR) 5 mg tablet TAKE 1 TABLET (5 MG TOTAL) BY MOUTH DAILY. 90 tablet 1   • Semaglutide-Weight Management (Wegovy) 2.4 MG/0.75ML INJECT 2.4 MG UNDER THE SKIN WEEKLY 3 mL 1   • triamcinolone (KENALOG) 0.1 % cream APPLY TO HAND ECZEMA TWICE DAILY FOR TWO WEEKS ONLY AS NEEDED FOR FLARING RASH.     • vardenafil (LEVITRA) 10 MG tablet Take 1 tablet (10 mg total) by mouth daily as needed for erectile dysfunction       No current facility-administered medications for this visit.       Social History:  Social History     Socioeconomic History   • Marital status: /Civil Union     Spouse name: None   • Number of children: None   • Years of education: None   • Highest education level: None   Occupational History   • None   Tobacco Use   • Smoking status: Former     Current packs/day: 0.00     Types: Cigarettes     Quit date:      Years since quittin.1   • Smokeless tobacco: Former     Quit date:    Vaping Use   • Vaping status: Never Used   Substance and Sexual Activity   • Alcohol use: Yes     Comment: socially   • Drug use: No   • Sexual activity: Yes   Other Topics Concern   • None   Social History Narrative   • None     Social Drivers of Health     Financial Resource Strain: Not on file   Food Insecurity: Not on file   Transportation Needs: Not on file   Physical Activity: Not on file   Stress: Not on file   Social Connections: Not on file   Intimate Partner Violence: Not on file   Housing Stability: Not on file          Review of  "Systems   Constitutional:  Negative for chills and fever.   Respiratory:  Negative for cough and shortness of breath.    Cardiovascular:  Negative for chest pain and leg swelling.   Gastrointestinal:  Negative for nausea and vomiting.   Musculoskeletal:  Positive for arthralgias. Negative for gait problem.   Skin:  Negative for wound.   Allergic/Immunologic: Negative for immunocompromised state.   Neurological:  Negative for weakness and numbness.   Hematological: Negative.    Psychiatric/Behavioral:  Negative for behavioral problems and confusion.          Objective:      Ht 5' 8\" (1.727 m)   Wt 83.5 kg (184 lb)   BMI 27.98 kg/m²          Physical Exam  Vitals reviewed.   Constitutional:       General: He is not in acute distress.     Appearance: He is not toxic-appearing or diaphoretic.   HENT:      Head: Normocephalic and atraumatic.   Eyes:      Extraocular Movements: Extraocular movements intact.   Cardiovascular:      Rate and Rhythm: Normal rate and regular rhythm.      Pulses: Normal pulses.           Dorsalis pedis pulses are 2+ on the right side and 2+ on the left side.        Posterior tibial pulses are 2+ on the right side and 2+ on the left side.   Pulmonary:      Effort: Pulmonary effort is normal. No respiratory distress.   Musculoskeletal:         General: Deformity present. No signs of injury.      Cervical back: Normal range of motion and neck supple.      Right lower leg: No edema.      Left lower leg: No edema.      Right foot: No foot drop.      Left foot: No foot drop.      Comments: Hypertrophy of right 1st MPJ with limited ROM.  No acute swelling or redness.  No warmth.  Lateral angulation of right great toe.    Skin:     General: Skin is warm.      Capillary Refill: Capillary refill takes less than 2 seconds.      Coloration: Skin is not cyanotic or mottled.      Findings: No abscess, erythema or wound.      Nails: There is no clubbing.   Neurological:      General: No focal deficit " present.      Mental Status: He is alert and oriented to person, place, and time.      Cranial Nerves: No cranial nerve deficit.      Sensory: No sensory deficit.      Motor: No weakness.      Coordination: Coordination normal.   Psychiatric:         Mood and Affect: Mood normal.         Behavior: Behavior normal.         Thought Content: Thought content normal.         Judgment: Judgment normal.

## 2025-02-05 ENCOUNTER — OFFICE VISIT (OUTPATIENT)
Dept: FAMILY MEDICINE CLINIC | Facility: CLINIC | Age: 60
End: 2025-02-05
Payer: COMMERCIAL

## 2025-02-05 VITALS
HEIGHT: 69 IN | SYSTOLIC BLOOD PRESSURE: 120 MMHG | BODY MASS INDEX: 26.66 KG/M2 | WEIGHT: 180 LBS | HEART RATE: 86 BPM | OXYGEN SATURATION: 98 % | TEMPERATURE: 97.8 F | DIASTOLIC BLOOD PRESSURE: 88 MMHG

## 2025-02-05 DIAGNOSIS — I10 BENIGN HYPERTENSION: Primary | ICD-10-CM

## 2025-02-05 DIAGNOSIS — E66.3 OVERWEIGHT (BMI 25.0-29.9): ICD-10-CM

## 2025-02-05 DIAGNOSIS — R73.03 PREDIABETES: ICD-10-CM

## 2025-02-05 DIAGNOSIS — E78.00 PURE HYPERCHOLESTEROLEMIA: ICD-10-CM

## 2025-02-05 DIAGNOSIS — R79.0 LOW IRON STORES: ICD-10-CM

## 2025-02-05 PROCEDURE — 99214 OFFICE O/P EST MOD 30 MIN: CPT | Performed by: FAMILY MEDICINE

## 2025-02-05 RX ORDER — SEMAGLUTIDE 2.4 MG/.75ML
2.4 INJECTION, SOLUTION SUBCUTANEOUS WEEKLY
Qty: 3 ML | Refills: 1 | Status: SHIPPED | OUTPATIENT
Start: 2025-02-05

## 2025-02-05 NOTE — PROGRESS NOTES
"Name: Neo Bruce      : 1965      MRN: [id number]  Encounter Provider: Liza Braga DO  Encounter Date: 2025   Encounter department: [facility]  :  Assessment & Plan  Benign hypertension  Continue current medical regimen of fitness and dietary.  Follow-up in 6 months with new PCP       Prediabetes    Orders:    Semaglutide-Weight Management (Wegovy) 2.4 MG/0.75ML; Inject 0.75 mL (2.4 mg total) under the skin once a week    Overweight (BMI 25.0-29.9)  Orders:    Semaglutide-Weight Management (Wegovy) 2.4 MG/0.75ML; Inject 0.75 mL (2.4 mg total) under the skin once a week    Low iron stores  Since there is no previous ferritin or iron studies prior to Wegovy, cannot at this point prove that Wegovy is a factor.  However consideration should be given to this, especially since vitamin B12 and D were also low and are improved with supplementation.  Patient is up-to-date on his colonoscopy.  Patient does take chronic PPI for \"GERD \".  This may be contributing as well to nutrient deficiency.  May need to have an upper endoscopy and/or hematology opinion.  Orders:    Iron; Future    Ferritin; Future    CBC and Platelet; Future    Pure hypercholesterolemia  Patient on statin.  Continue current therapy lipids up-to-date.              History of Present Illness   HPI  Chief Complaint   Patient presents with    Follow-up     6 month ov      Review of Systems   Constitutional:         Patient was 196 pounds when he started the semaglutide in August   Gastrointestinal:         The unusual dysesthesias of the skin have also somewhat improved.  Apparently that can be seen in patients that take the semaglutide   Musculoskeletal:         Joint pain improved   All other systems reviewed and are negative.    Component      Latest Ref Rng 2025 2/3/2025   GLUCOSE      65 - 99 mg/dL 90     BUN      7 - 25 mg/dL 16     Creatinine      0.70 - 1.30 mg/dL 0.96     GFR, Calculated      > OR = 60 " "mL/min/1.73m2 91     SL AMB BUN/CREATININE RATIO      6 - 22 (calc) SEE NOTE:     Sodium      135 - 146 mmol/L 140     Potassium      3.5 - 5.3 mmol/L 4.3     Chloride      98 - 110 mmol/L 105     Carbon Dioxide      20 - 32 mmol/L 27     Calcium      8.6 - 10.3 mg/dL 9.4     Total Protein      6.1 - 8.1 g/dL 6.9     Albumin      3.6 - 5.1 g/dL 4.4     Globulin      1.9 - 3.7 g/dL (calc) 2.5     Albumin/Globulin Ratio      1.0 - 2.5 (calc) 1.8     Total Bilirubin      0.2 - 1.2 mg/dL 0.5     ALK PHOS      35 - 144 U/L 47     AST      10 - 35 U/L 14     ALT      9 - 46 U/L 18     Cholesterol      <200 mg/dL 134     HDL      > OR = 40 mg/dL 50     Triglycerides      <150 mg/dL 82     LDL Calculated      mg/dL (calc) 68     Chol/HDL Ratio      <5.0 (calc) 2.7     Non-HDL Cholesterol      <130 mg/dL (calc) 84     EXTERNAL VITAMIN D,25      30 - 100 ng/mL  50    Vitamin B-12      200 - 1,100 pg/mL  472    Hemoglobin A1C      <5.7 % of total Hgb 5.4         Labs with slight improvement of the B12.  Continue B12 daily 1000 mcg.  Great improvement in the vitamin D.  Marginal improvement of the iron  And ferritin.  CBC fairly unremarkable  Objective   /88   Pulse 86   Temp 97.8 °F (36.6 °C) (Temporal)   Ht 5' 9\" (1.753 m)   Wt 81.6 kg (180 lb)   SpO2 98%   BMI 26.58 kg/m²      Physical Exam  Constitutional:       Appearance: He is normal weight.   HENT:      Head: Normocephalic.      Right Ear: Tympanic membrane normal.      Left Ear: Tympanic membrane normal.      Mouth/Throat:      Mouth: Mucous membranes are moist.   Cardiovascular:      Rate and Rhythm: Normal rate and regular rhythm.      Pulses: Normal pulses.      Heart sounds: Normal heart sounds. No murmur heard.     No friction rub. No gallop.   Pulmonary:      Effort: Pulmonary effort is normal.      Breath sounds: Normal breath sounds.   Abdominal:      Palpations: Abdomen is soft.   Musculoskeletal:         General: Normal range of motion.   Skin:    "  Findings: No rash.   Neurological:      Mental Status: He is alert and oriented to person, place, and time.   Psychiatric:         Mood and Affect: Mood normal.         Behavior: Behavior normal.         Thought Content: Thought content normal.         Judgment: Judgment normal.

## 2025-02-08 LAB
ERYTHROCYTE [DISTWIDTH] IN BLOOD BY AUTOMATED COUNT: 19.1 % (ref 11–15)
FERRITIN SERPL-MCNC: 7 NG/ML (ref 38–380)
HCT VFR BLD AUTO: 43.1 % (ref 38.5–50)
HGB BLD-MCNC: 13.8 G/DL (ref 13.2–17.1)
IRON SERPL-MCNC: 36 MCG/DL (ref 50–180)
MCH RBC QN AUTO: 26 PG (ref 27–33)
MCHC RBC AUTO-ENTMCNC: 32 G/DL (ref 32–36)
MCV RBC AUTO: 81.3 FL (ref 80–100)
PLATELET # BLD AUTO: 216 THOUSAND/UL (ref 140–400)
PMV BLD REES-ECKER: 11 FL (ref 7.5–12.5)
RBC # BLD AUTO: 5.3 MILLION/UL (ref 4.2–5.8)
WBC # BLD AUTO: 5.9 THOUSAND/UL (ref 3.8–10.8)

## 2025-02-17 ENCOUNTER — RESULTS FOLLOW-UP (OUTPATIENT)
Dept: FAMILY MEDICINE CLINIC | Facility: CLINIC | Age: 60
End: 2025-02-17

## 2025-03-02 ENCOUNTER — TELEPHONE (OUTPATIENT)
Dept: FAMILY MEDICINE CLINIC | Facility: CLINIC | Age: 60
End: 2025-03-02

## 2025-03-02 NOTE — TELEPHONE ENCOUNTER
"I have a little bit more about this patient's labs and scenario and this is my opinion on the low iron which I have dictated in his chart note    Low iron stores  Since there is no previous ferritin or iron studies prior to Wegovy, cannot at this point prove that Wegovy is a factor.  However consideration should be given to this, especially since vitamin B12 and D were also low and are improved with supplementation.  Patient is up-to-date on his colonoscopy.  Patient does take chronic PPI for \"GERD \".  This may be contributing as well to nutrient deficiency.  May need to have upper endoscopy and/or hematology opinion.  "

## 2025-03-03 NOTE — TELEPHONE ENCOUNTER
I called and spoke with the patient and made him aware he verbalized understanding, he is taking an Iron supplement as well.  He would like to know in your opinion who it would be reasonable to see first he is open to either.

## 2025-03-04 DIAGNOSIS — Z01.818 ENCOUNTER FOR DIAGNOSTIC ENDOSCOPY: ICD-10-CM

## 2025-03-04 DIAGNOSIS — K21.00 GASTROESOPHAGEAL REFLUX DISEASE WITH ESOPHAGITIS WITHOUT HEMORRHAGE: Primary | ICD-10-CM

## 2025-03-04 DIAGNOSIS — R79.0 LOW IRON STORES: ICD-10-CM

## 2025-03-04 NOTE — TELEPHONE ENCOUNTER
Since he has been on long-term PPI, and we do not have a establish diagnosis other than symptomatic use for GERD, he should establish with gastroenterology to have an EGD.  I think that is probably the first recommendation.  His colonoscopy was done by a colorectal specialist and they do not do upper endoscopy so he would have to meet with gastroenterologist.  They would probably have more information about their experience with these GLP-1's causing iron deficiency as well from a gastric function standpoint.  I can put a referral into [facility].

## 2025-03-30 DIAGNOSIS — E66.3 OVERWEIGHT (BMI 25.0-29.9): ICD-10-CM

## 2025-03-30 DIAGNOSIS — R73.03 PREDIABETES: ICD-10-CM

## 2025-03-30 RX ORDER — SEMAGLUTIDE 2.4 MG/.75ML
2.4 INJECTION, SOLUTION SUBCUTANEOUS WEEKLY
Qty: 3 ML | Refills: 0 | Status: SHIPPED | OUTPATIENT
Start: 2025-03-30

## 2025-03-31 ENCOUNTER — TELEPHONE (OUTPATIENT)
Dept: GASTROENTEROLOGY | Facility: CLINIC | Age: 60
End: 2025-03-31

## 2025-03-31 ENCOUNTER — CONSULT (OUTPATIENT)
Dept: GASTROENTEROLOGY | Facility: CLINIC | Age: 60
End: 2025-03-31
Payer: COMMERCIAL

## 2025-03-31 VITALS
SYSTOLIC BLOOD PRESSURE: 126 MMHG | WEIGHT: 181 LBS | BODY MASS INDEX: 26.81 KG/M2 | HEIGHT: 69 IN | DIASTOLIC BLOOD PRESSURE: 76 MMHG

## 2025-03-31 DIAGNOSIS — Z01.818 ENCOUNTER FOR DIAGNOSTIC ENDOSCOPY: ICD-10-CM

## 2025-03-31 DIAGNOSIS — K59.00 CONSTIPATION, UNSPECIFIED CONSTIPATION TYPE: ICD-10-CM

## 2025-03-31 DIAGNOSIS — K21.00 GASTROESOPHAGEAL REFLUX DISEASE WITH ESOPHAGITIS WITHOUT HEMORRHAGE: ICD-10-CM

## 2025-03-31 DIAGNOSIS — R79.0 LOW IRON STORES: ICD-10-CM

## 2025-03-31 DIAGNOSIS — Z86.0100 HISTORY OF COLON POLYPS: Primary | ICD-10-CM

## 2025-03-31 PROCEDURE — 99244 OFF/OP CNSLTJ NEW/EST MOD 40: CPT | Performed by: PHYSICIAN ASSISTANT

## 2025-03-31 RX ORDER — SODIUM CHLORIDE, SODIUM LACTATE, POTASSIUM CHLORIDE, CALCIUM CHLORIDE 600; 310; 30; 20 MG/100ML; MG/100ML; MG/100ML; MG/100ML
125 INJECTION, SOLUTION INTRAVENOUS CONTINUOUS
OUTPATIENT
Start: 2025-03-31

## 2025-03-31 RX ORDER — FERROUS SULFATE 325(65) MG
325 TABLET, DELAYED RELEASE (ENTERIC COATED) ORAL
COMMUNITY

## 2025-03-31 NOTE — ASSESSMENT & PLAN NOTE
Has chronic reflux for at least 10 years previously maintained with Pepcid 40 mg BID in the last 2 years has needed additional omeprazole 20 mg qd with good control.  An antireflux regiment lifestyle modifications were reviewed.  Recently found to have iron deficiency without anemia currently on oral iron and receiving IV iron infusion tomorrow per PCP.  Referred for EGD to evaluate for esophagogastroduodenitis, peptic ulcer disease, H. pylori, Lucas's etc. I reviewed indications, risks, benefits and alternatives on endoscopy and pt is willing to proceed.    Orders:    Ambulatory Referral to Gastroenterology    EGD; Future  -cont omeprazole 20 mg qd  -cont pepcid 40 mg BID

## 2025-03-31 NOTE — TELEPHONE ENCOUNTER
Scheduled date of EGD(as of today):4-21-25  Physician performing EGD: Enriqueta  Location of EGD:[facility]  Instructions reviewed with patient by:STEPHEN  Clearances: nooo ON WEGOVY  gave  info on when to stop   pt aware

## 2025-03-31 NOTE — TELEPHONE ENCOUNTER
Lvm for pt to call and reschedule egd to [facility]-pt's insurance is under contract negotiations.  The egd scheduled for 4/21/25 at [facility] has been cancelled

## 2025-03-31 NOTE — PATIENT INSTRUCTIONS
-EGD  -cont reflux meds  -antireflux diet  -drink water/fiber/try Colace if needed  -iron supplement daily  -3m OV

## 2025-03-31 NOTE — TELEPHONE ENCOUNTER
Scheduled date of EGD(as of today):5/5/2025  Physician performing EGD:Dr. Robison  Location of EGD:[facility]  Instructions reviewed with patient by:CB- instructions were given to patient  Clearances: n/a

## 2025-03-31 NOTE — TELEPHONE ENCOUNTER
----- Message from Alondra ARREGUIN sent at 3/31/2025  9:33 AM EDT -----  Regarding: [facility] Blues Insurance Issues  Neo was added to the [facility] schedule 4/21/25. His insurance is a M[employer] blue and is included in the contract issues. Please reschedule to the hospital. Thank you.

## 2025-04-10 DIAGNOSIS — E78.00 PURE HYPERCHOLESTEROLEMIA: ICD-10-CM

## 2025-04-10 RX ORDER — ROSUVASTATIN CALCIUM 5 MG/1
5 TABLET, COATED ORAL DAILY
Qty: 90 TABLET | Refills: 1 | Status: SHIPPED | OUTPATIENT
Start: 2025-04-10

## 2025-04-25 DIAGNOSIS — E78.1 HYPERTRIGLYCERIDEMIA: ICD-10-CM

## 2025-04-25 RX ORDER — OMEGA-3-ACID ETHYL ESTERS 1 G/1
2 CAPSULE, LIQUID FILLED ORAL 2 TIMES DAILY
Qty: 360 CAPSULE | Refills: 0 | Status: SHIPPED | OUTPATIENT
Start: 2025-04-25

## 2025-04-28 DIAGNOSIS — E66.3 OVERWEIGHT (BMI 25.0-29.9): ICD-10-CM

## 2025-04-28 DIAGNOSIS — R73.03 PREDIABETES: ICD-10-CM

## 2025-04-28 RX ORDER — SEMAGLUTIDE 2.4 MG/.75ML
2.4 INJECTION, SOLUTION SUBCUTANEOUS WEEKLY
Qty: 3 ML | Refills: 0 | Status: SHIPPED | OUTPATIENT
Start: 2025-04-28

## 2025-05-02 ENCOUNTER — NURSE TRIAGE (OUTPATIENT)
Dept: OTHER | Facility: OTHER | Age: 60
End: 2025-05-02

## 2025-05-02 ENCOUNTER — TELEPHONE (OUTPATIENT)
Dept: FAMILY MEDICINE CLINIC | Facility: CLINIC | Age: 60
End: 2025-05-02

## 2025-05-02 DIAGNOSIS — E66.3 OVERWEIGHT (BMI 25.0-29.9): ICD-10-CM

## 2025-05-02 DIAGNOSIS — R73.03 PREDIABETES: ICD-10-CM

## 2025-05-02 RX ORDER — SEMAGLUTIDE 2.4 MG/.75ML
2.4 INJECTION, SOLUTION SUBCUTANEOUS WEEKLY
Qty: 3 ML | Refills: 0 | OUTPATIENT
Start: 2025-05-02

## 2025-05-02 NOTE — TELEPHONE ENCOUNTER
"Regarding: endoscopy question/when to stop eating  ----- Message from Kacey TONY sent at 5/2/2025  7:50 PM EDT -----  Patient stated, \"I have a procedure on Monday for an endoscopy and would like to know by when I need to stop eating.\"    "

## 2025-05-02 NOTE — TELEPHONE ENCOUNTER
Reason for call:   [x] Prior Auth  [] Other:     Caller:  [] Patient  [x] Pharmacy  Name: [facility]   Address: [address] [place]   Callback Number: [phone]    Medication: Wegovy     Dose/Frequency: 2.4 mg/0.75 mL. Inject 0.75 mL under the skin once a week     Quantity: 3 mL     Ordering Provider:   [x] PCP/Provider -   [] Speciality/Provider -     Has the patient tried other medications and failed? If failed, which medications did they fail?    [] No   [] Yes -     Is the patient's insurance updated in EPIC?   [] Yes   [] No     Is a copy of the patient's insurance scanned in EPIC?   [] Yes   [] No

## 2025-05-03 NOTE — TELEPHONE ENCOUNTER
"FOLLOW UP: None    REASON FOR CONVERSATION: Endoscopy    SYMPTOMS: None    OTHER: Patient asking when he needs to stop eating / drinking prior to procedure / if he should stop taking his medications.    DISPOSITION: Home Care    Reason for Disposition   Endoscopy, getting ready, questions about    Answer Assessment - Initial Assessment Questions  1. DATE/TIME: \"When did you have your endoscopy?\"         Monday morning    2. MAIN CONCERN: \"What is your main concern right now?\" \"What questions do you have?\"        When do I need to stop eating / any meds to hold    Protocols used: Endoscopy (Upper GI) Symptoms and Questions-Adult-    "

## 2025-05-05 ENCOUNTER — ANESTHESIA (OUTPATIENT)
Dept: GASTROENTEROLOGY | Facility: HOSPITAL | Age: 60
End: 2025-05-05
Payer: COMMERCIAL

## 2025-05-05 ENCOUNTER — HOSPITAL ENCOUNTER (OUTPATIENT)
Dept: GASTROENTEROLOGY | Facility: HOSPITAL | Age: 60
Setting detail: OUTPATIENT SURGERY
Discharge: HOME | End: 2025-05-05
Attending: PHYSICIAN ASSISTANT
Payer: COMMERCIAL

## 2025-05-05 ENCOUNTER — ANESTHESIA EVENT (OUTPATIENT)
Dept: GASTROENTEROLOGY | Facility: HOSPITAL | Age: 60
End: 2025-05-05
Payer: COMMERCIAL

## 2025-05-05 VITALS
OXYGEN SATURATION: 94 % | BODY MASS INDEX: 25.92 KG/M2 | SYSTOLIC BLOOD PRESSURE: 113 MMHG | HEART RATE: 69 BPM | WEIGHT: 175 LBS | HEIGHT: 69 IN | TEMPERATURE: 97.1 F | DIASTOLIC BLOOD PRESSURE: 71 MMHG | RESPIRATION RATE: 20 BRPM

## 2025-05-05 DIAGNOSIS — R79.0 LOW IRON STORES: ICD-10-CM

## 2025-05-05 DIAGNOSIS — K21.00 GASTROESOPHAGEAL REFLUX DISEASE WITH ESOPHAGITIS WITHOUT HEMORRHAGE: ICD-10-CM

## 2025-05-05 PROBLEM — I10 HTN (HYPERTENSION): Status: ACTIVE | Noted: 2025-05-05

## 2025-05-05 PROCEDURE — 88305 TISSUE EXAM BY PATHOLOGIST: CPT | Performed by: PATHOLOGY

## 2025-05-05 PROCEDURE — 88342 IMHCHEM/IMCYTCHM 1ST ANTB: CPT | Performed by: PATHOLOGY

## 2025-05-05 PROCEDURE — 43239 EGD BIOPSY SINGLE/MULTIPLE: CPT | Performed by: INTERNAL MEDICINE

## 2025-05-05 RX ORDER — PROPOFOL 10 MG/ML
INJECTION, EMULSION INTRAVENOUS AS NEEDED
Status: DISCONTINUED | OUTPATIENT
Start: 2025-05-05 | End: 2025-05-05

## 2025-05-05 RX ORDER — LIDOCAINE HYDROCHLORIDE 20 MG/ML
INJECTION, SOLUTION EPIDURAL; INFILTRATION; INTRACAUDAL; PERINEURAL AS NEEDED
Status: DISCONTINUED | OUTPATIENT
Start: 2025-05-05 | End: 2025-05-05

## 2025-05-05 RX ORDER — SODIUM CHLORIDE 9 MG/ML
INJECTION, SOLUTION INTRAVENOUS CONTINUOUS PRN
Status: DISCONTINUED | OUTPATIENT
Start: 2025-05-05 | End: 2025-05-05

## 2025-05-05 RX ORDER — SODIUM CHLORIDE, SODIUM LACTATE, POTASSIUM CHLORIDE, CALCIUM CHLORIDE 600; 310; 30; 20 MG/100ML; MG/100ML; MG/100ML; MG/100ML
125 INJECTION, SOLUTION INTRAVENOUS CONTINUOUS
Status: DISCONTINUED | OUTPATIENT
Start: 2025-05-05 | End: 2025-05-09 | Stop reason: HOSPADM

## 2025-05-05 RX ADMIN — PROPOFOL 50 MG: 10 INJECTION, EMULSION INTRAVENOUS at 13:42

## 2025-05-05 RX ADMIN — SODIUM CHLORIDE: 0.9 INJECTION, SOLUTION INTRAVENOUS at 13:19

## 2025-05-05 RX ADMIN — PROPOFOL 150 MG: 10 INJECTION, EMULSION INTRAVENOUS at 13:39

## 2025-05-05 RX ADMIN — LIDOCAINE HYDROCHLORIDE 100 MG: 20 INJECTION, SOLUTION EPIDURAL; INFILTRATION; INTRACAUDAL; PERINEURAL at 13:39

## 2025-05-05 NOTE — TELEPHONE ENCOUNTER
PA for Semaglutide-Weight Management (Wegovy) 2.4 MG/0.75ML APPROVED     Date(s) approved 5/5/2025 - 5/5/2026    Case # [id number]     Patient advised by          []weeSpringhart Message  []Phone call   []LMOM  []L/M to call office as no active Communication consent on file  [x]Unable to leave detailed message as VM not approved on Communication consent       Pharmacy advised by    [x]Fax  []Phone call  []Secure Chat    Specialty Pharmacy    []      Approval letter scanned into Media Yes

## 2025-05-05 NOTE — H&P
History and Physical - [facility]  Neo Bruce 60 y.o. male MRN: [id number]    HPI: Neo Bruce is a 60 y.o. year old male who presents for EGD secondary to GERD and anemia    REVIEW OF SYSTEMS: Per the HPI, and otherwise unremarkable.    Historical Information   Past Medical History:   Diagnosis Date    Allergic     Bunion     Calculus, ureter     Colon polyp     Diverticulosis     Enlarged prostate     GERD (gastroesophageal reflux disease)     Gout     Hiatal hernia     Hyperlipidemia     Hypertension 2022    Kidney stone     Multiple benign polyps of large intestine     Pain in joint of right shoulder region     Plantar fasciitis     Psoriasis     hands and scalp    Rectal polyp     Seasonal allergies     Seborrheic dermatitis of scalp     Shin splints     Wears glasses      Past Surgical History:   Procedure Laterality Date    APPENDECTOMY  1982    COLONOSCOPY  2005    FIBEROPTIC; [id number] - (STASIK)  - (Stalin) - (Stalin)    COLONOSCOPY  2019    LASIK      CORNEAL    CO CYSTO/URETERO W/LITHOTRIPSY &INDWELL STENT INSRT Left 01/15/2019    Procedure: CYSTO, URETEROSCOPY W/HOLMIUM LASER, RETROGRADE PYELOGRAM, STENT INSERTION;  Surgeon: Jose Prasad MD;  Location: [facility];  Service: Urology    SEPTOPLASTY  10/1996    VASECTOMY  2002    SURGERY VAS DEFERENS     Social History   Social History     Substance and Sexual Activity   Alcohol Use Yes    Alcohol/week: 2.0 standard drinks of alcohol    Types: 2 Standard drinks or equivalent per week    Comment: socially     Social History     Substance and Sexual Activity   Drug Use No     Social History     Tobacco Use   Smoking Status Former    Current packs/day: 0.00    Average packs/day: 1 pack/day for 10.0 years (10.0 ttl pk-yrs)    Types: Cigarettes    Quit date:     Years since quittin.3   Smokeless Tobacco Never     Family History   Problem Relation Age of Onset    Melanoma Father         MALIGNANT  "MELANOMA OF THE SKIN    Prostate cancer Father     Coronary artery disease Father     Myocarditis Father     Vision loss Father         Maculardegeneration    Hypertension Mother     Atrial fibrillation Mother     Heart disease Mother     Stroke Mother     Osteoporosis Brother         brother diagnosed with elevated PSA    Colon cancer Maternal Grandmother        Meds/Allergies       Current Outpatient Medications:     famotidine (PEPCID) 20 mg tablet    ferrous sulfate 325 (65 FE) MG EC tablet    fluticasone (FLONASE) 50 mcg/act nasal spray    levocetirizine (XYZAL) 5 MG tablet    losartan (COZAAR) 25 mg tablet    omega-3-acid ethyl esters (LOVAZA) 1 g capsule    omeprazole (PriLOSEC) 20 mg delayed release capsule    rosuvastatin (CRESTOR) 5 mg tablet    Blood Pressure Monitoring (Blood Pressure Digital Soln) KIT    mupirocin (BACTROBAN) 2 % ointment    Semaglutide-Weight Management (Wegovy) 2.4 MG/0.75ML    triamcinolone (KENALOG) 0.1 % cream    vardenafil (LEVITRA) 10 MG tablet    Current Facility-Administered Medications:     lactated ringers infusion, 125 mL/hr, Intravenous, Continuous    Facility-Administered Medications Ordered in Other Encounters:     sodium chloride 0.9 % infusion, , Intravenous, Continuous PRN, New Bag at 05/05/25 1319    Allergies   Allergen Reactions    No Known Allergies Other (See Comments)       Objective     /65   Pulse 61   Temp (!) 97.1 °F (36.2 °C) (Temporal)   Resp 18   Ht 5' 9\" (1.753 m)   Wt 79.4 kg (175 lb)   SpO2 97%   BMI 25.84 kg/m²     PHYSICAL EXAM    Gen: NAD AAOx3  Head: Normocephalic, Atraumatic  CV: S1S2 RRR no m/r/g  CHEST: Clear b/l no c/r/w  ABD: soft, +BS NT/ND  EXT: no edema    ASSESSMENT/PLAN:  This is a 60 y.o. year old male here for EGD to GERD and anemia, and he is stable and optimized for his procedure.        "

## 2025-05-05 NOTE — ANESTHESIA POSTPROCEDURE EVALUATION
Post-Op Assessment Note    CV Status:  Stable  Pain Score: 0    Pain management: adequate       Mental Status:  Alert and awake   Hydration Status:  Euvolemic and stable   PONV Controlled:  None   Airway Patency:  Patent     Post Op Vitals Reviewed: Yes    No anethesia notable event occurred.    Staff: CRNA       Last Filed PACU Vitals:  Vitals Value Taken Time   Temp     Pulse 69 05/05/25 1346   /71 05/05/25 1346   Resp 19 05/05/25 1346   SpO2 96 % 05/05/25 1346

## 2025-05-05 NOTE — TELEPHONE ENCOUNTER
PA for     Semaglutide-Weight Management (Wegovy) 2.4 MG/0.75ML   SUBMITTED to [employer]    via    []CMM-KEY:   [x]Surescripts-Case ID # [id number]   []Availity-Auth ID # NDC #   []Faxed to plan   []Other website   []Phone call Case ID #     [x]PA sent as URGENT    All office notes, labs and other pertaining documents and studies sent. Clinical questions answered. Awaiting determination from insurance company.     Turnaround time for your insurance to make a decision on your Prior Authorization can take 7-21 business days.

## 2025-05-05 NOTE — ANESTHESIA PREPROCEDURE EVALUATION
Procedure:  EGD    Relevant Problems   ANESTHESIA (within normal limits)      CARDIO   (+) HTN (hypertension)   (+) Hyperlipidemia      /RENAL   (+) BPH with urinary obstruction      NEURO/PSYCH   (+) Chronic right shoulder pain        Physical Exam    Airway    Mallampati score: I  TM Distance: >3 FB  Neck ROM: full     Dental   No notable dental hx     Cardiovascular  Cardiovascular exam normal    Pulmonary  Pulmonary exam normal     Other Findings        Anesthesia Plan  ASA Score- 2     Anesthesia Type- IV sedation with anesthesia with ASA Monitors.         Additional Monitors:     Airway Plan:     Comment: I discussed risks (reviewed with patient on the anesthesia consent form), benefits and alternatives of monitored sedation including the possibility under sedation to have recall or mild discomfort.  .       Plan Factors-    Chart reviewed.    Patient summary reviewed.                  Induction- intravenous.    Postoperative Plan-         Informed Consent- Anesthetic plan and risks discussed with patient.  I personally reviewed this patient with the CRNA. Discussed and agreed on the Anesthesia Plan with the CRNA..      NPO Status:  Vitals Value Taken Time   Date of last liquid 05/05/25 05/05/25 1223   Time of last liquid 0800 05/05/25 1223   Date of last solid 05/04/25 05/05/25 1223   Time of last solid 1800 05/05/25 1223

## 2025-05-09 PROCEDURE — 88342 IMHCHEM/IMCYTCHM 1ST ANTB: CPT | Performed by: PATHOLOGY

## 2025-05-09 PROCEDURE — 88305 TISSUE EXAM BY PATHOLOGIST: CPT | Performed by: PATHOLOGY

## 2025-05-19 ENCOUNTER — RESULTS FOLLOW-UP (OUTPATIENT)
Dept: GASTROENTEROLOGY | Facility: CLINIC | Age: 60
End: 2025-05-19

## 2025-05-31 DIAGNOSIS — E66.3 OVERWEIGHT (BMI 25.0-29.9): ICD-10-CM

## 2025-05-31 DIAGNOSIS — R73.03 PREDIABETES: ICD-10-CM

## 2025-06-02 NOTE — TELEPHONE ENCOUNTER
Requested medication(s) are due for refill today: Yes  **If antibiotic or given during sick visit, contact patient to discuss current symptoms.   **Confirm prescribing provider    LOV:  02/05/25  **If longer then 1 year, contact patient to schedule annual PRIOR to refilling. Once scheduled, adjust refill for 30 days, no refills.  **Update CareEverywhere to confirm not being seen elsewhere    NOV:  06/10/25 - New provider    Is patient due for annual visit: Yes, describe: next visit  **If future appointment, adjust to annual/follow up.  ** No appointment call to schedule annual/follow up.    Route to PCP, unless PCP no longer here, then physician they are seeing next.

## 2025-06-03 RX ORDER — SEMAGLUTIDE 2.4 MG/.75ML
2.4 INJECTION, SOLUTION SUBCUTANEOUS WEEKLY
Qty: 3 ML | Refills: 0 | Status: SHIPPED | OUTPATIENT
Start: 2025-06-03

## 2025-06-05 ENCOUNTER — TELEPHONE (OUTPATIENT)
Dept: GASTROENTEROLOGY | Facility: CLINIC | Age: 60
End: 2025-06-05

## 2025-06-10 ENCOUNTER — OFFICE VISIT (OUTPATIENT)
Dept: FAMILY MEDICINE CLINIC | Facility: CLINIC | Age: 60
End: 2025-06-10
Payer: COMMERCIAL

## 2025-06-10 VITALS
BODY MASS INDEX: 25.2 KG/M2 | HEIGHT: 70 IN | OXYGEN SATURATION: 97 % | DIASTOLIC BLOOD PRESSURE: 78 MMHG | HEART RATE: 69 BPM | WEIGHT: 176 LBS | TEMPERATURE: 97.6 F | SYSTOLIC BLOOD PRESSURE: 130 MMHG

## 2025-06-10 DIAGNOSIS — Z23 ENCOUNTER FOR IMMUNIZATION: ICD-10-CM

## 2025-06-10 DIAGNOSIS — E78.00 PURE HYPERCHOLESTEROLEMIA: ICD-10-CM

## 2025-06-10 DIAGNOSIS — I10 PRIMARY HYPERTENSION: ICD-10-CM

## 2025-06-10 DIAGNOSIS — Z12.5 SCREENING FOR PROSTATE CANCER: ICD-10-CM

## 2025-06-10 DIAGNOSIS — K21.00 GASTROESOPHAGEAL REFLUX DISEASE WITH ESOPHAGITIS WITHOUT HEMORRHAGE: ICD-10-CM

## 2025-06-10 DIAGNOSIS — N13.8 BPH WITH URINARY OBSTRUCTION: Primary | ICD-10-CM

## 2025-06-10 DIAGNOSIS — R73.01 ELEVATED FASTING BLOOD SUGAR: ICD-10-CM

## 2025-06-10 DIAGNOSIS — N40.1 BPH WITH URINARY OBSTRUCTION: Primary | ICD-10-CM

## 2025-06-10 PROCEDURE — 99214 OFFICE O/P EST MOD 30 MIN: CPT | Performed by: FAMILY MEDICINE

## 2025-06-10 RX ORDER — MOMETASONE FUROATE 1 MG/ML
SOLUTION TOPICAL
COMMUNITY
Start: 2025-05-19

## 2025-06-10 RX ORDER — CLINDAMYCIN PHOSPHATE 10 UG/ML
LOTION TOPICAL
COMMUNITY
Start: 2025-04-02

## 2025-06-10 NOTE — PROGRESS NOTES
Name: Neo Bruce      : 1965      MRN: [id number]  Encounter Provider: Irineo Mayorga DO  Encounter Date: 6/10/2025   Encounter department: [facility]    Assessment & Plan  BPH with urinary obstruction  Follows with urology       Gastroesophageal reflux disease with esophagitis without hemorrhage  Stable on Pepcid and proton pump inhibitor.  Follows with GI       Pure hypercholesterolemia    Orders:  •  Comprehensive metabolic panel; Future  •  Lipid Panel with Direct LDL reflex; Future  •  TSH, 3rd generation with Free T4 reflex; Future    Elevated fasting blood sugar    Orders:  •  Hemoglobin A1c (w/out EAG); Future    Primary hypertension  Blood pressure controlled.  Continue losartan 25 mg daily       Encounter for immunization         Screening for prostate cancer    Orders:  •  PSA, Total Screen; Future         History of Present Illness     Patient presents to establish care.  His current PCP is retiring.  Chronic medical problems include hyperlipidemia, hypertension, history of elevated blood glucose, BPH, skin cancer.  He has no specific concerns at this time.      Review of Systems   Constitutional: Negative.    Respiratory: Negative.     Cardiovascular: Negative.    Gastrointestinal: Negative.    Genitourinary: Negative.    Musculoskeletal: Negative.    Psychiatric/Behavioral: Negative.       Past Medical History[1]  Past Surgical History[2]  Family History[3]  Social History[4]  Medications[5]  Allergies   Allergen Reactions   • No Known Allergies Other (See Comments)     Immunization History   Administered Date(s) Administered   • COVID-19 Pfizer mRNA vacc PF rocco-sucrose 12 yr and older (Comirnaty) 2024   • COVID-19, unspecified 2021, 2021, 12/10/2021   • INFLUENZA 2018, 10/14/2020, 10/20/2021, 10/21/2022   • Influenza Recombinant Preservative Free Im 2024   • Influenza, injectable, quadrivalent, preservative free 0.5 mL 10/13/2023   •  "Influenza, recombinant, quadrivalent,injectable, preservative free 11/02/2018, 10/11/2019, 10/14/2020, 10/20/2021, 10/21/2022   • Influenza, seasonal, injectable 01/01/2011, 10/15/2013   • Tdap 08/17/2013   • Zoster Vaccine Recombinant 09/03/2021, 01/27/2022     Objective   /78   Pulse 69   Temp 97.6 °F (36.4 °C)   Ht 5' 10\" (1.778 m)   Wt 79.8 kg (176 lb)   SpO2 97%   BMI 25.25 kg/m²     Physical Exam  Vitals and nursing note reviewed.   Constitutional:       General: He is not in acute distress.     Appearance: Normal appearance.   HENT:      Head: Normocephalic and atraumatic.     Eyes:      Pupils: Pupils are equal, round, and reactive to light.       Cardiovascular:      Rate and Rhythm: Normal rate and regular rhythm.      Pulses: Normal pulses.      Heart sounds: Normal heart sounds.   Pulmonary:      Effort: Pulmonary effort is normal.      Breath sounds: Normal breath sounds.     Musculoskeletal:         General: Normal range of motion.      Cervical back: Normal range of motion.     Skin:     General: Skin is warm and dry.     Neurological:      General: No focal deficit present.      Mental Status: He is alert and oriented to person, place, and time. Mental status is at baseline.     Psychiatric:         Mood and Affect: Mood normal.         Behavior: Behavior normal.         Thought Content: Thought content normal.         Judgment: Judgment normal.                [1]  Past Medical History:  Diagnosis Date   • Allergic    • Bunion    • Calculus, ureter    • Colon polyp    • Diverticulosis    • Enlarged prostate    • GERD (gastroesophageal reflux disease)    • Gout    • Hiatal hernia    • Hyperlipidemia    • Hypertension 08/2022   • Kidney stone 2019   • Multiple benign polyps of large intestine    • Pain in joint of right shoulder region    • Plantar fasciitis    • Psoriasis     hands and scalp   • Rectal polyp    • Seasonal allergies    • Seborrheic dermatitis of scalp    • Shin splints    • " Wears glasses    [2]  Past Surgical History:  Procedure Laterality Date   • APPENDECTOMY  1982   • COLONOSCOPY  2005    FIBEROPTIC; [id number] - (STASIK)  - ([facility]) - ([facility])   • COLONOSCOPY  2019   • LASIK      CORNEAL   • NE CYSTO/URETERO W/LITHOTRIPSY &INDWELL STENT INSRT Left 01/15/2019    Procedure: CYSTO, URETEROSCOPY W/HOLMIUM LASER, RETROGRADE PYELOGRAM, STENT INSERTION;  Surgeon: Jose Prasad MD;  Location: [facility];  Service: Urology   • SEPTOPLASTY  10/1996   • VASECTOMY  2002    SURGERY VAS DEFERENS   [3]  Family History  Problem Relation Name Age of Onset   • Melanoma Father Skye         MALIGNANT MELANOMA OF THE SKIN   • Prostate cancer Father Skye    • Coronary artery disease Father Skye    • Myocarditis Father Skye    • Vision loss Father Skye         Maculardegeneration   • Hypertension Mother Nikki    • Atrial fibrillation Mother Nikki    • Heart disease Mother Nikki    • Stroke Mother Nikki    • Osteoporosis Brother Christine         brother diagnosed with elevated PSA   • Colon cancer Maternal Grandmother Nori    [4]  Social History  Tobacco Use   • Smoking status: Former     Current packs/day: 0.00     Average packs/day: 1 pack/day for 10.0 years (10.0 ttl pk-yrs)     Types: Cigarettes     Quit date:      Years since quittin.4   • Smokeless tobacco: Never   Vaping Use   • Vaping status: Never Used   Substance and Sexual Activity   • Alcohol use: Yes     Alcohol/week: 2.0 standard drinks of alcohol     Types: 2 Standard drinks or equivalent per week     Comment: socially   • Drug use: No   • Sexual activity: Yes     Partners: Female     Birth control/protection: Male Sterilization     Comment: Vascectomy   [5]  Current Outpatient Medications on File Prior to Visit   Medication Sig   • clindamycin (CLEOCIN T) 1 % lotion APPLY TO AFFECTED AREA ON NECK AND TRUNK AREAS DAILY   • famotidine (PEPCID) 20 mg tablet TAKE 1 TABLET BY MOUTH TWICE A DAY AS  NEEDED FOR HEARTBURN   • ferrous sulfate 325 (65 FE) MG EC tablet Take 325 mg by mouth daily with breakfast   • fluticasone (FLONASE) 50 mcg/act nasal spray SPRAY 2 SPRAYS INTO EACH NOSTRIL EVERY DAY   • levocetirizine (XYZAL) 5 MG tablet TAKE 1 TABLET BY MOUTH EVERY DAY IN THE EVENING   • losartan (COZAAR) 25 mg tablet TAKE 1 TABLET (25 MG TOTAL) BY MOUTH DAILY.   • mometasone (ELOCON) 0.1 % lotion APPLY TO AFFECTED AREAS OF THE SKIN ONCE DAILY, AS NEEDED WITH FLARES   • mupirocin (BACTROBAN) 2 % ointment    • omega-3-acid ethyl esters (LOVAZA) 1 g capsule TAKE 2 CAPSULES BY MOUTH TWICE A DAY   • omeprazole (PriLOSEC) 20 mg delayed release capsule TAKE 1 CAPSULE BY MOUTH EVERY DAY BEFORE BREAKFAST   • rosuvastatin (CRESTOR) 5 mg tablet TAKE 1 TABLET (5 MG TOTAL) BY MOUTH DAILY.   • Semaglutide-Weight Management (Wegovy) 2.4 MG/0.75ML INJECT 0.75 ML (2.4 MG TOTAL) UNDER THE SKIN ONCE A WEEK   • triamcinolone (KENALOG) 0.1 % cream    • vardenafil (LEVITRA) 10 MG tablet Take 1 tablet (10 mg total) by mouth daily as needed for erectile dysfunction   • Blood Pressure Monitoring (Blood Pressure Digital Soln) KIT

## 2025-07-02 DIAGNOSIS — R73.03 PREDIABETES: ICD-10-CM

## 2025-07-02 DIAGNOSIS — E66.3 OVERWEIGHT (BMI 25.0-29.9): ICD-10-CM

## 2025-07-06 RX ORDER — SEMAGLUTIDE 2.4 MG/.75ML
2.4 INJECTION, SOLUTION SUBCUTANEOUS WEEKLY
Qty: 3 ML | Refills: 5 | Status: SHIPPED | OUTPATIENT
Start: 2025-07-06

## 2025-07-12 DIAGNOSIS — J30.9 ALLERGIC RHINITIS, UNSPECIFIED SEASONALITY, UNSPECIFIED TRIGGER: ICD-10-CM

## 2025-07-15 RX ORDER — FLUTICASONE PROPIONATE 50 MCG
SPRAY, SUSPENSION (ML) NASAL
Qty: 48 ML | Refills: 1 | Status: SHIPPED | OUTPATIENT
Start: 2025-07-15

## 2025-07-26 LAB
ALBUMIN SERPL-MCNC: 4.7 G/DL (ref 3.6–5.1)
ALBUMIN/GLOB SERPL: 2.1 (CALC) (ref 1–2.5)
ALP SERPL-CCNC: 45 U/L (ref 35–144)
ALT SERPL-CCNC: 25 U/L (ref 9–46)
AST SERPL-CCNC: 16 U/L (ref 10–35)
BILIRUB SERPL-MCNC: 0.6 MG/DL (ref 0.2–1.2)
BUN SERPL-MCNC: 15 MG/DL (ref 7–25)
BUN/CREAT SERPL: NORMAL (CALC) (ref 6–22)
CALCIUM SERPL-MCNC: 9.5 MG/DL (ref 8.6–10.3)
CHLORIDE SERPL-SCNC: 105 MMOL/L (ref 98–110)
CHOLEST SERPL-MCNC: 168 MG/DL
CHOLEST/HDLC SERPL: 3.1 (CALC)
CO2 SERPL-SCNC: 28 MMOL/L (ref 20–32)
CREAT SERPL-MCNC: 0.94 MG/DL (ref 0.7–1.35)
GFR/BSA.PRED SERPLBLD CYS-BASED-ARV: 93 ML/MIN/1.73M2
GLOBULIN SER CALC-MCNC: 2.2 G/DL (CALC) (ref 1.9–3.7)
GLUCOSE SERPL-MCNC: 81 MG/DL (ref 65–99)
HBA1C MFR BLD: 5.1 %
HDLC SERPL-MCNC: 55 MG/DL
LDLC SERPL CALC-MCNC: 84 MG/DL (CALC)
NONHDLC SERPL-MCNC: 113 MG/DL (CALC)
POTASSIUM SERPL-SCNC: 4.5 MMOL/L (ref 3.5–5.3)
PROT SERPL-MCNC: 6.9 G/DL (ref 6.1–8.1)
PSA SERPL-MCNC: 2.31 NG/ML
SODIUM SERPL-SCNC: 140 MMOL/L (ref 135–146)
TRIGL SERPL-MCNC: 196 MG/DL
TSH SERPL-ACNC: 1.56 MIU/L (ref 0.4–4.5)

## 2025-08-01 ENCOUNTER — OFFICE VISIT (OUTPATIENT)
Dept: FAMILY MEDICINE CLINIC | Facility: CLINIC | Age: 60
End: 2025-08-01
Payer: COMMERCIAL

## 2025-08-01 VITALS
SYSTOLIC BLOOD PRESSURE: 126 MMHG | DIASTOLIC BLOOD PRESSURE: 68 MMHG | HEIGHT: 70 IN | OXYGEN SATURATION: 98 % | HEART RATE: 66 BPM | BODY MASS INDEX: 24.77 KG/M2 | TEMPERATURE: 97.1 F | WEIGHT: 173 LBS

## 2025-08-01 DIAGNOSIS — R73.01 ELEVATED FASTING BLOOD SUGAR: ICD-10-CM

## 2025-08-01 DIAGNOSIS — J30.2 SEASONAL ALLERGIC RHINITIS, UNSPECIFIED TRIGGER: ICD-10-CM

## 2025-08-01 DIAGNOSIS — I10 PRIMARY HYPERTENSION: ICD-10-CM

## 2025-08-01 DIAGNOSIS — E78.00 PURE HYPERCHOLESTEROLEMIA: ICD-10-CM

## 2025-08-01 DIAGNOSIS — I10 BENIGN HYPERTENSION: ICD-10-CM

## 2025-08-01 DIAGNOSIS — Z00.00 ANNUAL PHYSICAL EXAM: Primary | ICD-10-CM

## 2025-08-01 DIAGNOSIS — Z23 ENCOUNTER FOR IMMUNIZATION: ICD-10-CM

## 2025-08-01 PROCEDURE — 90715 TDAP VACCINE 7 YRS/> IM: CPT

## 2025-08-01 PROCEDURE — 99396 PREV VISIT EST AGE 40-64: CPT | Performed by: FAMILY MEDICINE

## 2025-08-01 PROCEDURE — 90471 IMMUNIZATION ADMIN: CPT

## 2025-08-01 RX ORDER — LOSARTAN POTASSIUM 25 MG/1
25 TABLET ORAL DAILY
Qty: 90 TABLET | Refills: 1 | Status: SHIPPED | OUTPATIENT
Start: 2025-08-01

## 2025-08-01 RX ORDER — LEVOCETIRIZINE DIHYDROCHLORIDE 5 MG/1
5 TABLET, FILM COATED ORAL EVERY EVENING
Qty: 90 TABLET | Refills: 1 | Status: SHIPPED | OUTPATIENT
Start: 2025-08-01

## (undated) DEVICE — SCD SEQUENTIAL COMPRESSION COMFORT SLEEVE MEDIUM KNEE LENGTH: Brand: KENDALL SCD

## (undated) DEVICE — PACK TUR

## (undated) DEVICE — LUBRICANT SURGILUBE TUBE 4 OZ  FLIP TOP

## (undated) DEVICE — LASER FIBER HOLMIUM 272MICRON

## (undated) DEVICE — SPECIMEN CONTAINER STERILE PEEL PACK

## (undated) DEVICE — TUBING SUCTION 5MM X 12 FT

## (undated) DEVICE — INVIEW CLEAR LEGGINGS: Brand: CONVERTORS

## (undated) DEVICE — BASKET SPECIMEN RETRIVAL 1.9FR 120CM

## (undated) DEVICE — GLOVE SRG BIOGEL 7.5

## (undated) DEVICE — 3M™ STERI-STRIP™ COMPOUND BENZOIN TINCTURE 40 BAGS/CARTON 4 CARTONS/CASE C1544: Brand: 3M™ STERI-STRIP™

## (undated) DEVICE — TRANSPOSAL ULTRAFLEX DUO/QUAD ULTRA CART MANIFOLD

## (undated) DEVICE — UROCATCH BAG